# Patient Record
Sex: FEMALE | Race: WHITE | Employment: OTHER | URBAN - METROPOLITAN AREA
[De-identification: names, ages, dates, MRNs, and addresses within clinical notes are randomized per-mention and may not be internally consistent; named-entity substitution may affect disease eponyms.]

---

## 2017-03-21 ENCOUNTER — OFFICE VISIT (OUTPATIENT)
Dept: ENT CLINIC | Age: 72
End: 2017-03-21

## 2017-03-21 VITALS
RESPIRATION RATE: 16 BRPM | DIASTOLIC BLOOD PRESSURE: 77 MMHG | SYSTOLIC BLOOD PRESSURE: 129 MMHG | WEIGHT: 142 LBS | HEART RATE: 68 BPM | HEIGHT: 63 IN | BODY MASS INDEX: 25.16 KG/M2

## 2017-03-21 DIAGNOSIS — H61.23 BILATERAL IMPACTED CERUMEN: Primary | ICD-10-CM

## 2017-03-21 PROCEDURE — 69210 REMOVE IMPACTED EAR WAX UNI: CPT | Performed by: OTOLARYNGOLOGY

## 2017-03-21 PROCEDURE — 1036F TOBACCO NON-USER: CPT | Performed by: OTOLARYNGOLOGY

## 2017-04-20 ENCOUNTER — HOSPITAL ENCOUNTER (OUTPATIENT)
Dept: MAMMOGRAPHY | Age: 72
Discharge: HOME | End: 2017-04-20
Attending: OBSTETRICS & GYNECOLOGY | Admitting: OBSTETRICS & GYNECOLOGY

## 2017-04-20 DIAGNOSIS — Z12.31 ENCOUNTER FOR SCREENING MAMMOGRAM FOR BREAST CANCER: ICD-10-CM

## 2017-05-31 ENCOUNTER — HOSPITAL ENCOUNTER (OUTPATIENT)
Dept: PHYSICAL THERAPY | Age: 72
Discharge: HOME | End: 2017-05-31
Admitting: ORTHOPAEDIC SURGERY

## 2017-06-15 ENCOUNTER — TELEPHONE (OUTPATIENT)
Dept: FAMILY MEDICINE CLINIC | Age: 72
End: 2017-06-15

## 2017-06-15 DIAGNOSIS — Z13.820 SCREENING FOR OSTEOPOROSIS: Primary | ICD-10-CM

## 2017-06-15 DIAGNOSIS — M81.0 OSTEOPOROSIS: ICD-10-CM

## 2017-06-16 RX ORDER — LEVOTHYROXINE AND LIOTHYRONINE 57; 13.5 UG/1; UG/1
TABLET ORAL
Qty: 30 TABLET | Refills: 5 | Status: SHIPPED | OUTPATIENT
Start: 2017-06-16 | End: 2017-12-15 | Stop reason: SDUPTHER

## 2017-06-21 ENCOUNTER — HOSPITAL ENCOUNTER (OUTPATIENT)
Dept: GENERAL RADIOLOGY | Age: 72
Discharge: HOME | End: 2017-06-21
Attending: NURSE PRACTITIONER | Admitting: NURSE PRACTITIONER

## 2017-06-21 DIAGNOSIS — M81.0 AGE-RELATED OSTEOPOROSIS WITHOUT CURRENT PATHOLOGICAL FRACTURE: ICD-10-CM

## 2017-06-21 DIAGNOSIS — M81.0 OSTEOPOROSIS: ICD-10-CM

## 2017-06-21 DIAGNOSIS — Z13.820 SCREENING FOR OSTEOPOROSIS: ICD-10-CM

## 2017-07-17 ENCOUNTER — OFFICE VISIT (OUTPATIENT)
Dept: FAMILY MEDICINE CLINIC | Age: 72
End: 2017-07-17

## 2017-07-17 VITALS
OXYGEN SATURATION: 97 % | DIASTOLIC BLOOD PRESSURE: 74 MMHG | BODY MASS INDEX: 25.52 KG/M2 | RESPIRATION RATE: 12 BRPM | SYSTOLIC BLOOD PRESSURE: 128 MMHG | HEART RATE: 86 BPM | HEIGHT: 63 IN | WEIGHT: 144 LBS

## 2017-07-17 DIAGNOSIS — M81.0 OSTEOPOROSIS: Primary | ICD-10-CM

## 2017-07-17 DIAGNOSIS — R42 DIZZY SPELLS: ICD-10-CM

## 2017-07-17 DIAGNOSIS — M16.10 ARTHRITIS, HIP: ICD-10-CM

## 2017-07-17 DIAGNOSIS — L82.0 INFLAMED SEBORRHEIC KERATOSIS: ICD-10-CM

## 2017-07-17 DIAGNOSIS — E03.4 HYPOTHYROIDISM DUE TO ACQUIRED ATROPHY OF THYROID: ICD-10-CM

## 2017-07-17 PROCEDURE — G8599 NO ASA/ANTIPLAT THER USE RNG: HCPCS | Performed by: FAMILY MEDICINE

## 2017-07-17 PROCEDURE — 4040F PNEUMOC VAC/ADMIN/RCVD: CPT | Performed by: FAMILY MEDICINE

## 2017-07-17 PROCEDURE — G8399 PT W/DXA RESULTS DOCUMENT: HCPCS | Performed by: FAMILY MEDICINE

## 2017-07-17 PROCEDURE — 99214 OFFICE O/P EST MOD 30 MIN: CPT | Performed by: FAMILY MEDICINE

## 2017-07-17 PROCEDURE — 1123F ACP DISCUSS/DSCN MKR DOCD: CPT | Performed by: FAMILY MEDICINE

## 2017-07-17 PROCEDURE — G8419 CALC BMI OUT NRM PARAM NOF/U: HCPCS | Performed by: FAMILY MEDICINE

## 2017-07-17 PROCEDURE — G8427 DOCREV CUR MEDS BY ELIG CLIN: HCPCS | Performed by: FAMILY MEDICINE

## 2017-07-17 PROCEDURE — G8510 SCR DEP NEG, NO PLAN REQD: HCPCS | Performed by: FAMILY MEDICINE

## 2017-07-17 PROCEDURE — 1090F PRES/ABSN URINE INCON ASSESS: CPT | Performed by: FAMILY MEDICINE

## 2017-07-17 PROCEDURE — 3017F COLORECTAL CA SCREEN DOC REV: CPT | Performed by: FAMILY MEDICINE

## 2017-07-17 PROCEDURE — 3288F FALL RISK ASSESSMENT DOCD: CPT | Performed by: FAMILY MEDICINE

## 2017-07-17 PROCEDURE — 1036F TOBACCO NON-USER: CPT | Performed by: FAMILY MEDICINE

## 2017-07-17 PROCEDURE — 4005F PHARM THX FOR OP RXD: CPT | Performed by: FAMILY MEDICINE

## 2017-07-17 PROCEDURE — 3014F SCREEN MAMMO DOC REV: CPT | Performed by: FAMILY MEDICINE

## 2017-07-17 RX ORDER — ACYCLOVIR 400 MG/1
400 TABLET ORAL DAILY
COMMUNITY
Start: 2017-06-14 | End: 2020-01-07 | Stop reason: SDUPTHER

## 2017-07-17 ASSESSMENT — PATIENT HEALTH QUESTIONNAIRE - PHQ9
1. LITTLE INTEREST OR PLEASURE IN DOING THINGS: 0
SUM OF ALL RESPONSES TO PHQ9 QUESTIONS 1 & 2: 0
SUM OF ALL RESPONSES TO PHQ QUESTIONS 1-9: 0
2. FEELING DOWN, DEPRESSED OR HOPELESS: 0

## 2017-07-25 ENCOUNTER — TELEPHONE (OUTPATIENT)
Dept: FAMILY MEDICINE CLINIC | Age: 72
End: 2017-07-25

## 2017-08-09 ENCOUNTER — TELEPHONE (OUTPATIENT)
Dept: FAMILY MEDICINE CLINIC | Age: 72
End: 2017-08-09

## 2017-08-10 ENCOUNTER — OFFICE VISIT (OUTPATIENT)
Dept: FAMILY MEDICINE CLINIC | Age: 72
End: 2017-08-10

## 2017-08-10 VITALS
DIASTOLIC BLOOD PRESSURE: 62 MMHG | SYSTOLIC BLOOD PRESSURE: 106 MMHG | RESPIRATION RATE: 16 BRPM | BODY MASS INDEX: 26.26 KG/M2 | OXYGEN SATURATION: 96 % | TEMPERATURE: 97.8 F | WEIGHT: 148.2 LBS | HEIGHT: 63 IN | HEART RATE: 78 BPM

## 2017-08-10 DIAGNOSIS — K62.5 RECTAL BLEEDING: ICD-10-CM

## 2017-08-10 DIAGNOSIS — R01.1 CARDIAC MURMUR: Primary | ICD-10-CM

## 2017-08-10 DIAGNOSIS — K64.9 HEMORRHOIDS, UNSPECIFIED HEMORRHOID TYPE: ICD-10-CM

## 2017-08-10 LAB
HCT VFR BLD CALC: 41.1 % (ref 36–48)
HEMOGLOBIN: 13.6 G/DL (ref 12–16)
MCH RBC QN AUTO: 31.3 PG (ref 26–34)
MCHC RBC AUTO-ENTMCNC: 33.1 G/DL (ref 31–36)
MCV RBC AUTO: 94.7 FL (ref 80–100)
PDW BLD-RTO: 13.9 % (ref 12.4–15.4)
PLATELET # BLD: 202 K/UL (ref 135–450)
PMV BLD AUTO: 10.3 FL (ref 5–10.5)
RBC # BLD: 4.34 M/UL (ref 4–5.2)
WBC # BLD: 7.4 K/UL (ref 4–11)

## 2017-08-10 PROCEDURE — 1090F PRES/ABSN URINE INCON ASSESS: CPT | Performed by: REGISTERED NURSE

## 2017-08-10 PROCEDURE — G8399 PT W/DXA RESULTS DOCUMENT: HCPCS | Performed by: REGISTERED NURSE

## 2017-08-10 PROCEDURE — 1036F TOBACCO NON-USER: CPT | Performed by: REGISTERED NURSE

## 2017-08-10 PROCEDURE — G8599 NO ASA/ANTIPLAT THER USE RNG: HCPCS | Performed by: REGISTERED NURSE

## 2017-08-10 PROCEDURE — 1123F ACP DISCUSS/DSCN MKR DOCD: CPT | Performed by: REGISTERED NURSE

## 2017-08-10 PROCEDURE — G8427 DOCREV CUR MEDS BY ELIG CLIN: HCPCS | Performed by: REGISTERED NURSE

## 2017-08-10 PROCEDURE — 3017F COLORECTAL CA SCREEN DOC REV: CPT | Performed by: REGISTERED NURSE

## 2017-08-10 PROCEDURE — 3014F SCREEN MAMMO DOC REV: CPT | Performed by: REGISTERED NURSE

## 2017-08-10 PROCEDURE — 99213 OFFICE O/P EST LOW 20 MIN: CPT | Performed by: REGISTERED NURSE

## 2017-08-10 PROCEDURE — 4040F PNEUMOC VAC/ADMIN/RCVD: CPT | Performed by: REGISTERED NURSE

## 2017-08-10 PROCEDURE — 36415 COLL VENOUS BLD VENIPUNCTURE: CPT | Performed by: REGISTERED NURSE

## 2017-08-10 PROCEDURE — G8419 CALC BMI OUT NRM PARAM NOF/U: HCPCS | Performed by: REGISTERED NURSE

## 2017-08-10 ASSESSMENT — ENCOUNTER SYMPTOMS
ANAL BLEEDING: 1
ABDOMINAL PAIN: 0
SHORTNESS OF BREATH: 0
ABDOMINAL DISTENTION: 1
NAUSEA: 0
DIARRHEA: 0
BLOOD IN STOOL: 0
VOMITING: 0
CONSTIPATION: 0
COUGH: 0
WHEEZING: 0
RECTAL PAIN: 0
CHEST TIGHTNESS: 0

## 2017-08-11 ENCOUNTER — NURSE ONLY (OUTPATIENT)
Dept: FAMILY MEDICINE CLINIC | Age: 72
End: 2017-08-11

## 2017-08-11 DIAGNOSIS — K62.5 RECTAL BLEEDING: ICD-10-CM

## 2017-08-11 LAB
CONTROL: YES
HEMOCCULT STL QL: NORMAL

## 2017-08-11 PROCEDURE — 82274 ASSAY TEST FOR BLOOD FECAL: CPT | Performed by: REGISTERED NURSE

## 2017-08-18 ENCOUNTER — HOSPITAL ENCOUNTER (OUTPATIENT)
Dept: NON INVASIVE DIAGNOSTICS | Age: 72
Discharge: HOME | End: 2017-08-18
Attending: REGISTERED NURSE | Admitting: REGISTERED NURSE

## 2017-08-18 DIAGNOSIS — R01.1 CARDIAC MURMUR: ICD-10-CM

## 2017-08-18 LAB
LV EF: 60 %
LVEF MODALITY: NORMAL

## 2017-08-24 ENCOUNTER — OFFICE VISIT (OUTPATIENT)
Dept: FAMILY MEDICINE CLINIC | Age: 72
End: 2017-08-24

## 2017-08-24 ENCOUNTER — TELEPHONE (OUTPATIENT)
Dept: FAMILY MEDICINE CLINIC | Age: 72
End: 2017-08-24

## 2017-08-24 VITALS
BODY MASS INDEX: 26.05 KG/M2 | SYSTOLIC BLOOD PRESSURE: 132 MMHG | OXYGEN SATURATION: 98 % | HEIGHT: 63 IN | DIASTOLIC BLOOD PRESSURE: 70 MMHG | WEIGHT: 147 LBS | RESPIRATION RATE: 14 BRPM | HEART RATE: 63 BPM

## 2017-08-24 DIAGNOSIS — M25.562 ACUTE PAIN OF LEFT KNEE: Primary | ICD-10-CM

## 2017-08-24 DIAGNOSIS — K64.9 BLEEDING HEMORRHOIDS: ICD-10-CM

## 2017-08-24 DIAGNOSIS — K63.5 POLYP OF COLON, UNSPECIFIED PART OF COLON, UNSPECIFIED TYPE: ICD-10-CM

## 2017-08-24 DIAGNOSIS — M70.50 ANSERINE BURSITIS: ICD-10-CM

## 2017-08-24 PROCEDURE — G8419 CALC BMI OUT NRM PARAM NOF/U: HCPCS | Performed by: FAMILY MEDICINE

## 2017-08-24 PROCEDURE — 3017F COLORECTAL CA SCREEN DOC REV: CPT | Performed by: FAMILY MEDICINE

## 2017-08-24 PROCEDURE — 1123F ACP DISCUSS/DSCN MKR DOCD: CPT | Performed by: FAMILY MEDICINE

## 2017-08-24 PROCEDURE — 99213 OFFICE O/P EST LOW 20 MIN: CPT | Performed by: FAMILY MEDICINE

## 2017-08-24 PROCEDURE — 1090F PRES/ABSN URINE INCON ASSESS: CPT | Performed by: FAMILY MEDICINE

## 2017-08-24 PROCEDURE — G8399 PT W/DXA RESULTS DOCUMENT: HCPCS | Performed by: FAMILY MEDICINE

## 2017-08-24 PROCEDURE — 3014F SCREEN MAMMO DOC REV: CPT | Performed by: FAMILY MEDICINE

## 2017-08-24 PROCEDURE — G8427 DOCREV CUR MEDS BY ELIG CLIN: HCPCS | Performed by: FAMILY MEDICINE

## 2017-08-24 PROCEDURE — G8599 NO ASA/ANTIPLAT THER USE RNG: HCPCS | Performed by: FAMILY MEDICINE

## 2017-08-24 PROCEDURE — 4040F PNEUMOC VAC/ADMIN/RCVD: CPT | Performed by: FAMILY MEDICINE

## 2017-08-24 PROCEDURE — 1036F TOBACCO NON-USER: CPT | Performed by: FAMILY MEDICINE

## 2017-08-24 RX ORDER — ETODOLAC 400 MG/1
400 TABLET, FILM COATED ORAL 2 TIMES DAILY
Qty: 30 TABLET | Refills: 0 | Status: SHIPPED | OUTPATIENT
Start: 2017-08-24 | End: 2018-01-18 | Stop reason: ALTCHOICE

## 2017-08-24 NOTE — TELEPHONE ENCOUNTER
Pt seen today -  Her script for    etodolac (LODINE) 400 MG tablet - didn't go through        [facility] [address], [facility] [address] [phone]      E-scribing is down

## 2017-09-14 ENCOUNTER — HOSPITAL ENCOUNTER (OUTPATIENT)
Dept: PHYSICAL THERAPY | Age: 72
Discharge: HOME | End: 2017-09-30
Admitting: ORTHOPAEDIC SURGERY

## 2017-09-14 ASSESSMENT — PAIN DESCRIPTION - DIRECTION: RADIATING_TOWARDS: DENIES

## 2017-09-14 ASSESSMENT — PAIN DESCRIPTION - DESCRIPTORS: DESCRIPTORS: ACHING

## 2017-09-14 ASSESSMENT — PAIN DESCRIPTION - ORIENTATION: ORIENTATION: LEFT

## 2017-09-14 ASSESSMENT — PAIN SCALES - GENERAL: PAINLEVEL_OUTOF10: 5

## 2017-09-14 ASSESSMENT — PAIN DESCRIPTION - PAIN TYPE: TYPE: CHRONIC PAIN

## 2017-09-14 ASSESSMENT — PAIN DESCRIPTION - LOCATION: LOCATION: KNEE

## 2017-09-14 ASSESSMENT — PAIN DESCRIPTION - FREQUENCY: FREQUENCY: INTERMITTENT

## 2017-09-14 NOTE — PROGRESS NOTES
Primary  Bill Paying/Finance Responsibility: Primary  Shopping Responsibility: Primary  Dependent Care Responsibility: Primary  Health Care Management: Primary  Ambulation Assistance: Independent  Transfer Assistance: Independent  Active : Yes  Mode of Transportation: Car  Occupation: Retired  Leisure & Hobbies: rides motorcycle,  yard work  Objective    Outpatient fall risk assessment completed asking screening question if patient has fallen in the past 30 days:  [x] Yes  [] No    Based on screen for falls, patient demonstrates fall risk:  [] Yes  [] No    Interventions based on fall risk status:  Updated Problem List within Medical History  [] Yes   [] N/A    Asked family to assist with increased observation of the patient  [] Yes   [] N/A    Patient kept in visible area when not closely supervised by therapist  [] Yes   [] N/A    Repeatedly reinforce activity limits and safety needs with patient/family  [] Yes   [] N/A    Increase frequency of rounding/monitoring patient  [] Yes   [] N/A        Observation/Palpation  Posture: Good  Palpation: TTP medial L knee joint line,  Pes anserine  Edema: mild edema over L pes anserine,      AROM RLE (degrees)  RLE AROM: WNL  AROM LLE (degrees)  LLE AROM : WNL    Strength RLE  Strength RLE: WNL  Strength LLE  Strength LLE: WNL         Balance  Tandem Stance R Le  Tandem Stance L Le     Assessment   Conditions Requiring Skilled Therapeutic Intervention  Body structures, Functions, Activity limitations: Decreased high-level IADLs  Assessment: Pt presents with Left anteriomedial knee pain after walking for 5 hours up/down ramps. She stated pain worsened until she had injections and symptoms have begun to lessen.   She will benefit from skilled therapy to promote complete healing and return to PLOF  Treatment diagnosis:  Soft tissue inflammation L anterior Knee  Prognosis: Good  Decision Making: Low Complexity  Activity Tolerance  Activity Tolerance: Patient Tolerated treatment well         Plan   Plan  Times per week: 2  Times per day: Daily  Plan weeks: 3  Specific instructions for Next Treatment: US,  Neuro-re-ed,    Current Treatment Recommendations: Modalities, Home Exercise Program, Neuromuscular Re-education    G-Code  PT G-Codes  Functional Assessment Tool Used: LEFS  Score: 50  Functional Limitation: Mobility: Walking and moving around  Mobility: Walking and Moving Around Current Status (): At least 20 percent but less than 40 percent impaired, limited or restricted  Mobility: Walking and Moving Around Goal Status (): 0 percent impaired, limited or restricted    OutComes Score                                  LEFS Score: 62.5                  Goals  Short term goals  Time Frame for Short term goals: 3 weeks  Short term goal 1: pt to report no pain with riding motorcycle or doing household chores  Short term goal 2: No pain with walking any distance.   Patient Goals   Patient goals : resolve pain and do all normal activities       Therapy Time   Individual Concurrent Group Co-treatment   Time In 6057         Time Out 1258         Minutes 55         Timed Code Treatment Minutes: David [facility] Ho, [facility]

## 2017-09-18 ENCOUNTER — TELEPHONE (OUTPATIENT)
Dept: FAMILY MEDICINE CLINIC | Age: 72
End: 2017-09-18

## 2017-09-19 ENCOUNTER — OFFICE VISIT (OUTPATIENT)
Dept: ENT CLINIC | Age: 72
End: 2017-09-19

## 2017-09-19 VITALS
SYSTOLIC BLOOD PRESSURE: 119 MMHG | DIASTOLIC BLOOD PRESSURE: 67 MMHG | WEIGHT: 142.8 LBS | HEART RATE: 75 BPM | BODY MASS INDEX: 26.28 KG/M2 | HEIGHT: 62 IN

## 2017-09-19 DIAGNOSIS — H61.23 BILATERAL IMPACTED CERUMEN: Primary | ICD-10-CM

## 2017-09-19 PROCEDURE — 1036F TOBACCO NON-USER: CPT | Performed by: OTOLARYNGOLOGY

## 2017-09-19 PROCEDURE — 69210 REMOVE IMPACTED EAR WAX UNI: CPT | Performed by: OTOLARYNGOLOGY

## 2017-09-27 ENCOUNTER — HOSPITAL ENCOUNTER (OUTPATIENT)
Dept: PHYSICAL THERAPY | Age: 72
Discharge: HOME | End: 2017-09-27
Admitting: ORTHOPAEDIC SURGERY

## 2017-10-05 ENCOUNTER — HOSPITAL ENCOUNTER (OUTPATIENT)
Dept: PHYSICAL THERAPY | Age: 72
Discharge: HOME | End: 2017-10-05
Admitting: ORTHOPAEDIC SURGERY

## 2017-10-05 NOTE — PROGRESS NOTES
Outpatient Physical Therapy    Phone: [phone] Fax: [phone]    Physical Therapy Discharge Note  Date: 10/5/2017        Patient Name:  Natalia Lu    :  1945  MRN: [id number]  Restrictions/Precautions:    Medical/Treatment Diagnosis Information:   · Diagnosis: L knee OA, Pes anserine bursitis, possible med meiscus tear  · Treatment Diagnosis: Soft tissue inflammation anterior L knee     Tracking Information:       Physician Information Referring Practitioner: Dennys Dillon MD   [facility]   Plan of Care Sent Date:  17 Signed Received:    Visit Count / Total Visits       Insurance Approved Visits  /  Approved Dates:     Insurance Information PT Insurance Information: Medicare/AARP    Progress Note/G-codes   [x]  Yes                                     []  No Next Due:           G-Code (if applicable):      Date G-Code Applied:  10/5/17  PT G-Codes  Functional Assessment Tool Used: LEFS  Score: 60  Functional Limitation: Mobility: Walking and moving around  Mobility: Walking and Moving Around Goal Status (): 0 percent impaired, limited or restricted  Mobility: Walking and Moving Around Discharge Status (): At least 1 percent but less than 20 percent impaired, limited or restricted     Time Period for Report:  17  Through 10/5/17  Cancels/No-shows to date:  0      Plan of Care/Treatment to date:  [x] Therapeutic Exercise      [] Modalities:  [x] Therapeutic Activity       [] Ultrasound    [] Gait Training        [] Cervical Traction   [x] Neuromuscular Re-education      [] Cold/hotpack    [x] Instruction in HEP        [] Lumbar Traction  [] Manual Therapy        [x] Electrical Stimulation            [] Aquatic Therapy        [] Iontophoresis        ? [] Lymphedema management  [] Women's Health     Other:  [] Vestibular Rehab        []              ?       Significant Findings At Last Visit/Comments:    Subjective:   Pt states knee mostly feels normal.  Still has occasional pain.    Feels that still at times over does activities. Objective:  Observation:  Test measurements:   SLS  L   18 sec. L knee Strength, AROM WFL. Assessment:  Summary:   Patient's response to treatment:   Pt hgas progressed well. All goals met    Progress towards goals:    Short term goals  Time Frame for Short term goals: 3 weeks  Short term goal 1: pt to report no pain with riding motorcycle or doing household chores-  met,    Short term goal 2: No pain with walking any distance. -  Goal met  Patient Goals   Patient goals : resolve pain and do all normal activities  Partially met,  Still has some pain,  Particularly with rainy weather. Current Frequency/Duration:  # Days per week: [] 1 day # Weeks: [] 1 week [] 4 weeks      [x] 2 days? [] 2 weeks [] 5 weeks      [] 3 days   [x] 3 weeks [] 6 weeks     Rehab Potential: [x] Excellent [] Good [] Fair  [] Poor     Goal Status:  [] Achieved [x] Partially Achieved  [] Not Achieved     Patient Status: [] Continue per initial plan of Care     [x] Patient now discharged     [] Additional visits requested, Please re-certify for additional visits:      Requested frequency/duration:  X/week for weeks    Electronically signed by: Yamila Longoria, PT [id number]      If you have any questions or concerns, please don't hesitate to call.   Thank you for your referral.    Physician Signature:________________________________Date:__________________  By signing above, therapists plan is approved by physician

## 2017-11-01 ENCOUNTER — HOSPITAL ENCOUNTER (OUTPATIENT)
Dept: OTHER | Age: 72
Discharge: HOME | End: 2017-11-30
Attending: ORTHOPAEDIC SURGERY | Admitting: ORTHOPAEDIC SURGERY

## 2017-12-15 ENCOUNTER — OFFICE VISIT (OUTPATIENT)
Dept: FAMILY MEDICINE CLINIC | Age: 72
End: 2017-12-15

## 2017-12-15 VITALS
DIASTOLIC BLOOD PRESSURE: 74 MMHG | RESPIRATION RATE: 14 BRPM | WEIGHT: 142 LBS | BODY MASS INDEX: 26.13 KG/M2 | SYSTOLIC BLOOD PRESSURE: 110 MMHG | HEART RATE: 74 BPM | HEIGHT: 62 IN

## 2017-12-15 DIAGNOSIS — E55.9 VITAMIN D DEFICIENCY: ICD-10-CM

## 2017-12-15 DIAGNOSIS — F43.23 ADJUSTMENT REACTION WITH ANXIETY AND DEPRESSION: ICD-10-CM

## 2017-12-15 DIAGNOSIS — R05.9 COUGH: ICD-10-CM

## 2017-12-15 DIAGNOSIS — A60.09 HERPES GENITALIS IN WOMEN: ICD-10-CM

## 2017-12-15 DIAGNOSIS — M81.0 OSTEOPOROSIS WITHOUT CURRENT PATHOLOGICAL FRACTURE, UNSPECIFIED OSTEOPOROSIS TYPE: ICD-10-CM

## 2017-12-15 DIAGNOSIS — E78.00 HYPERCHOLESTEREMIA: ICD-10-CM

## 2017-12-15 DIAGNOSIS — E03.4 HYPOTHYROIDISM DUE TO ACQUIRED ATROPHY OF THYROID: Primary | ICD-10-CM

## 2017-12-15 LAB
A/G RATIO: 1.7 (ref 1.1–2.2)
ALBUMIN SERPL-MCNC: 4.4 G/DL (ref 3.4–5)
ALP BLD-CCNC: 82 U/L (ref 40–129)
ALT SERPL-CCNC: 14 U/L (ref 10–40)
ANION GAP SERPL CALCULATED.3IONS-SCNC: 14 MMOL/L (ref 3–16)
AST SERPL-CCNC: 19 U/L (ref 15–37)
BILIRUB SERPL-MCNC: 0.5 MG/DL (ref 0–1)
BUN BLDV-MCNC: 10 MG/DL (ref 7–20)
CALCIUM SERPL-MCNC: 9.6 MG/DL (ref 8.3–10.6)
CHLORIDE BLD-SCNC: 98 MMOL/L (ref 99–110)
CHOLESTEROL, TOTAL: 225 MG/DL (ref 0–199)
CO2: 25 MMOL/L (ref 21–32)
CREAT SERPL-MCNC: 0.6 MG/DL (ref 0.6–1.2)
GFR AFRICAN AMERICAN: >60
GFR NON-AFRICAN AMERICAN: >60
GLOBULIN: 2.6 G/DL
GLUCOSE BLD-MCNC: 99 MG/DL (ref 70–99)
HDLC SERPL-MCNC: 89 MG/DL (ref 40–60)
LDL CHOLESTEROL CALCULATED: 121 MG/DL
POTASSIUM SERPL-SCNC: 4.7 MMOL/L (ref 3.5–5.1)
SODIUM BLD-SCNC: 137 MMOL/L (ref 136–145)
T3 FREE: 6 PG/ML (ref 2.3–4.2)
T4 FREE: 1.3 NG/DL (ref 0.9–1.8)
TOTAL PROTEIN: 7 G/DL (ref 6.4–8.2)
TRIGL SERPL-MCNC: 74 MG/DL (ref 0–150)
TSH SERPL DL<=0.05 MIU/L-ACNC: 1.07 UIU/ML (ref 0.27–4.2)
VITAMIN D 25-HYDROXY: 39.9 NG/ML
VLDLC SERPL CALC-MCNC: 15 MG/DL

## 2017-12-15 PROCEDURE — G8484 FLU IMMUNIZE NO ADMIN: HCPCS | Performed by: FAMILY MEDICINE

## 2017-12-15 PROCEDURE — 1036F TOBACCO NON-USER: CPT | Performed by: FAMILY MEDICINE

## 2017-12-15 PROCEDURE — 1090F PRES/ABSN URINE INCON ASSESS: CPT | Performed by: FAMILY MEDICINE

## 2017-12-15 PROCEDURE — 1123F ACP DISCUSS/DSCN MKR DOCD: CPT | Performed by: FAMILY MEDICINE

## 2017-12-15 PROCEDURE — 3017F COLORECTAL CA SCREEN DOC REV: CPT | Performed by: FAMILY MEDICINE

## 2017-12-15 PROCEDURE — 4040F PNEUMOC VAC/ADMIN/RCVD: CPT | Performed by: FAMILY MEDICINE

## 2017-12-15 PROCEDURE — G8417 CALC BMI ABV UP PARAM F/U: HCPCS | Performed by: FAMILY MEDICINE

## 2017-12-15 PROCEDURE — G8399 PT W/DXA RESULTS DOCUMENT: HCPCS | Performed by: FAMILY MEDICINE

## 2017-12-15 PROCEDURE — 99214 OFFICE O/P EST MOD 30 MIN: CPT | Performed by: FAMILY MEDICINE

## 2017-12-15 PROCEDURE — G8599 NO ASA/ANTIPLAT THER USE RNG: HCPCS | Performed by: FAMILY MEDICINE

## 2017-12-15 PROCEDURE — G8427 DOCREV CUR MEDS BY ELIG CLIN: HCPCS | Performed by: FAMILY MEDICINE

## 2017-12-15 PROCEDURE — 36415 COLL VENOUS BLD VENIPUNCTURE: CPT | Performed by: FAMILY MEDICINE

## 2017-12-15 PROCEDURE — 4005F PHARM THX FOR OP RXD: CPT | Performed by: FAMILY MEDICINE

## 2017-12-15 PROCEDURE — 3014F SCREEN MAMMO DOC REV: CPT | Performed by: FAMILY MEDICINE

## 2017-12-15 RX ORDER — LEVOTHYROXINE AND LIOTHYRONINE 57; 13.5 UG/1; UG/1
TABLET ORAL
Qty: 30 TABLET | Refills: 5 | Status: SHIPPED | OUTPATIENT
Start: 2017-12-15 | End: 2018-06-15 | Stop reason: SDUPTHER
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
## 2018-01-18 ENCOUNTER — HOSPITAL ENCOUNTER (OUTPATIENT)
Dept: OTHER | Age: 73
Discharge: HOME | End: 2018-01-18
Attending: FAMILY MEDICINE | Admitting: FAMILY MEDICINE

## 2018-01-18 ENCOUNTER — OFFICE VISIT (OUTPATIENT)
Dept: FAMILY MEDICINE CLINIC | Age: 73
End: 2018-01-18

## 2018-01-18 VITALS
WEIGHT: 144 LBS | SYSTOLIC BLOOD PRESSURE: 112 MMHG | RESPIRATION RATE: 14 BRPM | DIASTOLIC BLOOD PRESSURE: 72 MMHG | HEIGHT: 62 IN | BODY MASS INDEX: 26.5 KG/M2 | TEMPERATURE: 98 F | HEART RATE: 92 BPM

## 2018-01-18 DIAGNOSIS — R05.9 COUGH: ICD-10-CM

## 2018-01-18 DIAGNOSIS — R05.9 COUGH: Primary | ICD-10-CM

## 2018-01-18 PROCEDURE — 3014F SCREEN MAMMO DOC REV: CPT | Performed by: FAMILY MEDICINE

## 2018-01-18 PROCEDURE — 1090F PRES/ABSN URINE INCON ASSESS: CPT | Performed by: FAMILY MEDICINE

## 2018-01-18 PROCEDURE — G8399 PT W/DXA RESULTS DOCUMENT: HCPCS | Performed by: FAMILY MEDICINE

## 2018-01-18 PROCEDURE — 4040F PNEUMOC VAC/ADMIN/RCVD: CPT | Performed by: FAMILY MEDICINE

## 2018-01-18 PROCEDURE — G8484 FLU IMMUNIZE NO ADMIN: HCPCS | Performed by: FAMILY MEDICINE

## 2018-01-18 PROCEDURE — G8427 DOCREV CUR MEDS BY ELIG CLIN: HCPCS | Performed by: FAMILY MEDICINE

## 2018-01-18 PROCEDURE — 1036F TOBACCO NON-USER: CPT | Performed by: FAMILY MEDICINE

## 2018-01-18 PROCEDURE — 99213 OFFICE O/P EST LOW 20 MIN: CPT | Performed by: FAMILY MEDICINE

## 2018-01-18 PROCEDURE — 3017F COLORECTAL CA SCREEN DOC REV: CPT | Performed by: FAMILY MEDICINE

## 2018-01-18 PROCEDURE — G8417 CALC BMI ABV UP PARAM F/U: HCPCS | Performed by: FAMILY MEDICINE

## 2018-01-18 PROCEDURE — G8599 NO ASA/ANTIPLAT THER USE RNG: HCPCS | Performed by: FAMILY MEDICINE

## 2018-01-18 PROCEDURE — 1123F ACP DISCUSS/DSCN MKR DOCD: CPT | Performed by: FAMILY MEDICINE

## 2018-01-18 RX ORDER — LEVOFLOXACIN 500 MG/1
500 TABLET, FILM COATED ORAL DAILY
Qty: 7 TABLET | Refills: 0 | Status: SHIPPED | OUTPATIENT
Start: 2018-01-18 | End: 2018-01-28

## 2018-01-18 RX ORDER — BENZONATATE 200 MG/1
200 CAPSULE ORAL 3 TIMES DAILY PRN
Qty: 21 CAPSULE | Refills: 0 | Status: SHIPPED | OUTPATIENT
Start: 2018-01-18 | End: 2018-01-25

## 2018-01-22 ENCOUNTER — TELEPHONE (OUTPATIENT)
Dept: FAMILY MEDICINE CLINIC | Age: 73
End: 2018-01-22

## 2018-01-26 ENCOUNTER — OFFICE VISIT (OUTPATIENT)
Dept: FAMILY MEDICINE CLINIC | Age: 73
End: 2018-01-26

## 2018-01-26 VITALS
BODY MASS INDEX: 26.5 KG/M2 | WEIGHT: 144 LBS | SYSTOLIC BLOOD PRESSURE: 114 MMHG | HEIGHT: 62 IN | DIASTOLIC BLOOD PRESSURE: 70 MMHG

## 2018-01-26 DIAGNOSIS — J18.9 PNEUMONIA DUE TO INFECTIOUS ORGANISM, UNSPECIFIED LATERALITY, UNSPECIFIED PART OF LUNG: ICD-10-CM

## 2018-01-26 DIAGNOSIS — R14.0 ABDOMINAL BLOATING: Primary | ICD-10-CM

## 2018-01-26 PROCEDURE — 1036F TOBACCO NON-USER: CPT | Performed by: FAMILY MEDICINE

## 2018-01-26 PROCEDURE — 3014F SCREEN MAMMO DOC REV: CPT | Performed by: FAMILY MEDICINE

## 2018-01-26 PROCEDURE — 1090F PRES/ABSN URINE INCON ASSESS: CPT | Performed by: FAMILY MEDICINE

## 2018-01-26 PROCEDURE — G8484 FLU IMMUNIZE NO ADMIN: HCPCS | Performed by: FAMILY MEDICINE

## 2018-01-26 PROCEDURE — G8427 DOCREV CUR MEDS BY ELIG CLIN: HCPCS | Performed by: FAMILY MEDICINE

## 2018-01-26 PROCEDURE — 4040F PNEUMOC VAC/ADMIN/RCVD: CPT | Performed by: FAMILY MEDICINE

## 2018-01-26 PROCEDURE — G8599 NO ASA/ANTIPLAT THER USE RNG: HCPCS | Performed by: FAMILY MEDICINE

## 2018-01-26 PROCEDURE — 1123F ACP DISCUSS/DSCN MKR DOCD: CPT | Performed by: FAMILY MEDICINE

## 2018-01-26 PROCEDURE — 99213 OFFICE O/P EST LOW 20 MIN: CPT | Performed by: FAMILY MEDICINE

## 2018-01-26 PROCEDURE — G8399 PT W/DXA RESULTS DOCUMENT: HCPCS | Performed by: FAMILY MEDICINE

## 2018-01-26 PROCEDURE — 3017F COLORECTAL CA SCREEN DOC REV: CPT | Performed by: FAMILY MEDICINE

## 2018-01-26 PROCEDURE — G8417 CALC BMI ABV UP PARAM F/U: HCPCS | Performed by: FAMILY MEDICINE

## 2018-02-23 ENCOUNTER — OFFICE VISIT (OUTPATIENT)
Dept: FAMILY MEDICINE CLINIC | Age: 73
End: 2018-02-23

## 2018-02-23 VITALS
RESPIRATION RATE: 21 BRPM | OXYGEN SATURATION: 97 % | BODY MASS INDEX: 25.65 KG/M2 | HEART RATE: 76 BPM | SYSTOLIC BLOOD PRESSURE: 110 MMHG | HEIGHT: 62 IN | DIASTOLIC BLOOD PRESSURE: 66 MMHG | TEMPERATURE: 97.9 F | WEIGHT: 139.4 LBS

## 2018-02-23 DIAGNOSIS — J02.9 SORE THROAT: Primary | ICD-10-CM

## 2018-02-23 LAB — S PYO AG THROAT QL: NORMAL

## 2018-02-23 PROCEDURE — 1036F TOBACCO NON-USER: CPT | Performed by: NURSE PRACTITIONER

## 2018-02-23 PROCEDURE — 3017F COLORECTAL CA SCREEN DOC REV: CPT | Performed by: NURSE PRACTITIONER

## 2018-02-23 PROCEDURE — 4040F PNEUMOC VAC/ADMIN/RCVD: CPT | Performed by: NURSE PRACTITIONER

## 2018-02-23 PROCEDURE — 3014F SCREEN MAMMO DOC REV: CPT | Performed by: NURSE PRACTITIONER

## 2018-02-23 PROCEDURE — 1123F ACP DISCUSS/DSCN MKR DOCD: CPT | Performed by: NURSE PRACTITIONER

## 2018-02-23 PROCEDURE — G8599 NO ASA/ANTIPLAT THER USE RNG: HCPCS | Performed by: NURSE PRACTITIONER

## 2018-02-23 PROCEDURE — 87880 STREP A ASSAY W/OPTIC: CPT | Performed by: NURSE PRACTITIONER

## 2018-02-23 PROCEDURE — 99213 OFFICE O/P EST LOW 20 MIN: CPT | Performed by: NURSE PRACTITIONER

## 2018-02-23 PROCEDURE — G8399 PT W/DXA RESULTS DOCUMENT: HCPCS | Performed by: NURSE PRACTITIONER

## 2018-02-23 PROCEDURE — 1090F PRES/ABSN URINE INCON ASSESS: CPT | Performed by: NURSE PRACTITIONER

## 2018-02-23 PROCEDURE — G8484 FLU IMMUNIZE NO ADMIN: HCPCS | Performed by: NURSE PRACTITIONER

## 2018-02-23 PROCEDURE — G8417 CALC BMI ABV UP PARAM F/U: HCPCS | Performed by: NURSE PRACTITIONER

## 2018-02-23 PROCEDURE — G8427 DOCREV CUR MEDS BY ELIG CLIN: HCPCS | Performed by: NURSE PRACTITIONER

## 2018-02-23 ASSESSMENT — ENCOUNTER SYMPTOMS: SORE THROAT: 1

## 2018-02-23 NOTE — PROGRESS NOTES
64 Ounces of water per day. This will loosen mucus in the head and chest & improve the weak feeling of dehydration, allow the body to get germ fighting resources to the infection. Half can be juice or sugar free Crystal Light. Don't count drinks with caffeine or carbonation. Infants can have Pedialyte liquid or freezer pops. Avoid salt if you have high Blood Pressure, swelling in the feet or ankles or have heart problems. 2. Humidity: Humidify the air to 35-50% ( or until the windows fog over slightly). Can use a humidifier, vaporizer, boil water on the stove or put a coffee can full of water on the heater vents. This will loosen mucus from infections and allergies. 3. Sleep: Get 8-10 hours a night and rest during the evening after work or school. If you have trouble sleeping, adults can take Melatonin 3mg up to 3 tabs at bedtime ( not for children or pregnant women). If Mono is suspected then sleep during 9PM to 9AM time span (if possible.)   4. Cough: Take cough medicines with Guaifenesin ( to loosen chest or head congestion) and Dextromethorphan ( to decrease excess cough). Robitussin D.M. Syrup every 4-6 hrs or Mucinex D.M. Pills twice a day. Use the pediatric formulations for children over 6 months making sure they are alcohol & sugar free for children, pregnant women, and diabetics. 5. Pain And Fevers: Take Acetaminophen ( Tylenol) for fevers, aches, and headaches. 2-500 mg every 8 hours for adults. Appropriate doses at bedtime for children may help them sleep better. If pregnant take 1 -500 mg. (Tylenol) every 8 hours as needed. Ibuprofen may be used if not pregnant, but should be given with food to avoid nausea. Avoid Ibuprofen if you have high blood pressure, CHF, or kidney problems. 6.Gargle: Gargle in the back of the throat with the head tilted back and to the sides with a strong mouthwash ( Listerine or Scope) after meals and at bedtime at least 4 -5 times a day.  This helps kill bacteria and

## 2018-02-25 LAB — THROAT CULTURE: NORMAL

## 2018-03-16 ENCOUNTER — OFFICE VISIT (OUTPATIENT)
Dept: ENT CLINIC | Age: 73
End: 2018-03-16

## 2018-03-16 VITALS — HEART RATE: 80 BPM | SYSTOLIC BLOOD PRESSURE: 110 MMHG | DIASTOLIC BLOOD PRESSURE: 60 MMHG

## 2018-03-16 DIAGNOSIS — H61.23 BILATERAL IMPACTED CERUMEN: Primary | ICD-10-CM

## 2018-03-16 PROCEDURE — 69210 REMOVE IMPACTED EAR WAX UNI: CPT | Performed by: OTOLARYNGOLOGY

## 2018-04-17 PROBLEM — R55 SYNCOPE AND COLLAPSE: Status: ACTIVE | Noted: 2018-04-17

## 2018-04-17 PROBLEM — E87.6 HYPOKALEMIA: Status: ACTIVE | Noted: 2018-04-17

## 2018-04-18 PROBLEM — R55 SYNCOPE AND COLLAPSE: Status: RESOLVED | Noted: 2018-04-17 | Resolved: 2018-04-18

## 2018-04-18 PROBLEM — E87.6 HYPOKALEMIA: Status: RESOLVED | Noted: 2018-04-17 | Resolved: 2018-04-18

## 2018-04-23 ENCOUNTER — HOSPITAL ENCOUNTER (OUTPATIENT)
Dept: MAMMOGRAPHY | Age: 73
Discharge: HOME | End: 2018-04-23
Attending: FAMILY MEDICINE | Admitting: FAMILY MEDICINE

## 2018-04-23 DIAGNOSIS — R92.8 ABNORMAL MAMMOGRAM OF RIGHT BREAST: Primary | ICD-10-CM

## 2018-04-23 DIAGNOSIS — Z12.31 ENCOUNTER FOR SCREENING MAMMOGRAM FOR BREAST CANCER: ICD-10-CM

## 2018-05-07 ENCOUNTER — HOSPITAL ENCOUNTER (OUTPATIENT)
Dept: MAMMOGRAPHY | Age: 73
Discharge: HOME | End: 2018-05-07
Admitting: FAMILY MEDICINE

## 2018-05-07 DIAGNOSIS — R92.8 OTHER ABNORMAL AND INCONCLUSIVE FINDINGS ON DIAGNOSTIC IMAGING OF BREAST: ICD-10-CM

## 2018-05-07 DIAGNOSIS — R92.8 ABNORMAL MAMMOGRAM: ICD-10-CM

## 2018-05-17 ENCOUNTER — OFFICE VISIT (OUTPATIENT)
Dept: FAMILY MEDICINE CLINIC | Age: 73
End: 2018-05-17

## 2018-05-17 VITALS
DIASTOLIC BLOOD PRESSURE: 78 MMHG | HEIGHT: 62 IN | BODY MASS INDEX: 25.25 KG/M2 | SYSTOLIC BLOOD PRESSURE: 122 MMHG | RESPIRATION RATE: 16 BRPM | WEIGHT: 137.2 LBS | HEART RATE: 72 BPM | TEMPERATURE: 97.8 F

## 2018-05-17 DIAGNOSIS — L30.9 DERMATITIS: Primary | ICD-10-CM

## 2018-05-17 PROCEDURE — G8599 NO ASA/ANTIPLAT THER USE RNG: HCPCS | Performed by: NURSE PRACTITIONER

## 2018-05-17 PROCEDURE — G8417 CALC BMI ABV UP PARAM F/U: HCPCS | Performed by: NURSE PRACTITIONER

## 2018-05-17 PROCEDURE — 1123F ACP DISCUSS/DSCN MKR DOCD: CPT | Performed by: NURSE PRACTITIONER

## 2018-05-17 PROCEDURE — 1036F TOBACCO NON-USER: CPT | Performed by: NURSE PRACTITIONER

## 2018-05-17 PROCEDURE — 1090F PRES/ABSN URINE INCON ASSESS: CPT | Performed by: NURSE PRACTITIONER

## 2018-05-17 PROCEDURE — 4040F PNEUMOC VAC/ADMIN/RCVD: CPT | Performed by: NURSE PRACTITIONER

## 2018-05-17 PROCEDURE — 1111F DSCHRG MED/CURRENT MED MERGE: CPT | Performed by: NURSE PRACTITIONER

## 2018-05-17 PROCEDURE — 3017F COLORECTAL CA SCREEN DOC REV: CPT | Performed by: NURSE PRACTITIONER

## 2018-05-17 PROCEDURE — 99213 OFFICE O/P EST LOW 20 MIN: CPT | Performed by: NURSE PRACTITIONER

## 2018-05-17 PROCEDURE — G8399 PT W/DXA RESULTS DOCUMENT: HCPCS | Performed by: NURSE PRACTITIONER

## 2018-05-17 PROCEDURE — G8427 DOCREV CUR MEDS BY ELIG CLIN: HCPCS | Performed by: NURSE PRACTITIONER

## 2018-05-17 RX ORDER — CLINDAMYCIN HYDROCHLORIDE 300 MG/1
300 CAPSULE ORAL 3 TIMES DAILY
Qty: 30 CAPSULE | Refills: 0 | Status: SHIPPED | OUTPATIENT
Start: 2018-05-17 | End: 2018-05-27

## 2018-05-17 RX ORDER — TRIAMCINOLONE ACETONIDE 1 MG/G
CREAM TOPICAL
Qty: 15 G | Refills: 0 | Status: SHIPPED | OUTPATIENT
Start: 2018-05-17 | End: 2018-06-15

## 2018-05-21 ENCOUNTER — TELEPHONE (OUTPATIENT)
Dept: FAMILY MEDICINE CLINIC | Age: 73
End: 2018-05-21

## 2018-05-21 RX ORDER — FLUCONAZOLE 150 MG/1
150 TABLET ORAL ONCE
Qty: 2 TABLET | Refills: 0 | Status: SHIPPED | OUTPATIENT
Start: 2018-05-21 | End: 2018-05-21

## 2018-05-21 NOTE — TELEPHONE ENCOUNTER
Pt was given an antibiotic last week and would like a diflucan called in.   She always gets a yeast infection  Please call

## 2018-06-15 ENCOUNTER — OFFICE VISIT (OUTPATIENT)
Dept: FAMILY MEDICINE CLINIC | Age: 73
End: 2018-06-15

## 2018-06-15 VITALS
WEIGHT: 139 LBS | HEART RATE: 80 BPM | HEIGHT: 62 IN | RESPIRATION RATE: 16 BRPM | DIASTOLIC BLOOD PRESSURE: 72 MMHG | BODY MASS INDEX: 25.58 KG/M2 | SYSTOLIC BLOOD PRESSURE: 100 MMHG

## 2018-06-15 DIAGNOSIS — E87.6 HYPOKALEMIA: Primary | ICD-10-CM

## 2018-06-15 DIAGNOSIS — R73.9 HYPERGLYCEMIA: ICD-10-CM

## 2018-06-15 DIAGNOSIS — R55 SYNCOPE, UNSPECIFIED SYNCOPE TYPE: ICD-10-CM

## 2018-06-15 DIAGNOSIS — M81.0 AGE-RELATED OSTEOPOROSIS WITHOUT CURRENT PATHOLOGICAL FRACTURE: ICD-10-CM

## 2018-06-15 DIAGNOSIS — E03.9 ACQUIRED HYPOTHYROIDISM: ICD-10-CM

## 2018-06-15 LAB
ANION GAP SERPL CALCULATED.3IONS-SCNC: 14 MMOL/L (ref 3–16)
BUN BLDV-MCNC: 13 MG/DL (ref 7–20)
CALCIUM SERPL-MCNC: 9.7 MG/DL (ref 8.3–10.6)
CHLORIDE BLD-SCNC: 96 MMOL/L (ref 99–110)
CO2: 25 MMOL/L (ref 21–32)
CREAT SERPL-MCNC: 0.6 MG/DL (ref 0.6–1.2)
GFR AFRICAN AMERICAN: >60
GFR NON-AFRICAN AMERICAN: >60
GLUCOSE BLD-MCNC: 92 MG/DL (ref 70–99)
POTASSIUM SERPL-SCNC: 4.3 MMOL/L (ref 3.5–5.1)
SODIUM BLD-SCNC: 135 MMOL/L (ref 136–145)
T3 FREE: 3.6 PG/ML (ref 2.3–4.2)
T4 FREE: 1.2 NG/DL (ref 0.9–1.8)
TSH SERPL DL<=0.05 MIU/L-ACNC: 0.73 UIU/ML (ref 0.27–4.2)
VITAMIN D 25-HYDROXY: 43.1 NG/ML

## 2018-06-15 PROCEDURE — 1090F PRES/ABSN URINE INCON ASSESS: CPT | Performed by: FAMILY MEDICINE

## 2018-06-15 PROCEDURE — 3017F COLORECTAL CA SCREEN DOC REV: CPT | Performed by: FAMILY MEDICINE

## 2018-06-15 PROCEDURE — 1123F ACP DISCUSS/DSCN MKR DOCD: CPT | Performed by: FAMILY MEDICINE

## 2018-06-15 PROCEDURE — 99214 OFFICE O/P EST MOD 30 MIN: CPT | Performed by: FAMILY MEDICINE

## 2018-06-15 PROCEDURE — G8417 CALC BMI ABV UP PARAM F/U: HCPCS | Performed by: FAMILY MEDICINE

## 2018-06-15 PROCEDURE — 36415 COLL VENOUS BLD VENIPUNCTURE: CPT | Performed by: FAMILY MEDICINE

## 2018-06-15 PROCEDURE — G8427 DOCREV CUR MEDS BY ELIG CLIN: HCPCS | Performed by: FAMILY MEDICINE

## 2018-06-15 PROCEDURE — G8599 NO ASA/ANTIPLAT THER USE RNG: HCPCS | Performed by: FAMILY MEDICINE

## 2018-06-15 PROCEDURE — 4040F PNEUMOC VAC/ADMIN/RCVD: CPT | Performed by: FAMILY MEDICINE

## 2018-06-15 PROCEDURE — G8399 PT W/DXA RESULTS DOCUMENT: HCPCS | Performed by: FAMILY MEDICINE

## 2018-06-15 PROCEDURE — 1036F TOBACCO NON-USER: CPT | Performed by: FAMILY MEDICINE

## 2018-06-15 RX ORDER — LEVOTHYROXINE AND LIOTHYRONINE 57; 13.5 UG/1; UG/1
TABLET ORAL
Qty: 30 TABLET | Refills: 5 | Status: SHIPPED | OUTPATIENT
Start: 2018-06-15 | End: 2019-01-04 | Stop reason: SDUPTHER

## 2018-08-08 ENCOUNTER — OFFICE VISIT (OUTPATIENT)
Dept: FAMILY MEDICINE CLINIC | Age: 73
End: 2018-08-08

## 2018-08-08 VITALS
TEMPERATURE: 98.1 F | DIASTOLIC BLOOD PRESSURE: 74 MMHG | WEIGHT: 136.8 LBS | BODY MASS INDEX: 25.17 KG/M2 | HEIGHT: 62 IN | SYSTOLIC BLOOD PRESSURE: 118 MMHG

## 2018-08-08 DIAGNOSIS — R39.12 WEAK URINE STREAM: Primary | ICD-10-CM

## 2018-08-08 DIAGNOSIS — R82.90 URINE ABNORMALITY: ICD-10-CM

## 2018-08-08 LAB
BILIRUBIN, POC: ABNORMAL
BLOOD URINE, POC: ABNORMAL
CLARITY, POC: CLEAR
COLOR, POC: YELLOW
GLUCOSE URINE, POC: ABNORMAL
KETONES, POC: ABNORMAL
LEUKOCYTE EST, POC: ABNORMAL
NITRITE, POC: ABNORMAL
PH, POC: 5.5
PROTEIN, POC: ABNORMAL
SPECIFIC GRAVITY, POC: 1.01
UROBILINOGEN, POC: 0.2

## 2018-08-08 PROCEDURE — 1036F TOBACCO NON-USER: CPT | Performed by: NURSE PRACTITIONER

## 2018-08-08 PROCEDURE — G8427 DOCREV CUR MEDS BY ELIG CLIN: HCPCS | Performed by: NURSE PRACTITIONER

## 2018-08-08 PROCEDURE — 1101F PT FALLS ASSESS-DOCD LE1/YR: CPT | Performed by: NURSE PRACTITIONER

## 2018-08-08 PROCEDURE — G8417 CALC BMI ABV UP PARAM F/U: HCPCS | Performed by: NURSE PRACTITIONER

## 2018-08-08 PROCEDURE — 1090F PRES/ABSN URINE INCON ASSESS: CPT | Performed by: NURSE PRACTITIONER

## 2018-08-08 PROCEDURE — 4040F PNEUMOC VAC/ADMIN/RCVD: CPT | Performed by: NURSE PRACTITIONER

## 2018-08-08 PROCEDURE — G8599 NO ASA/ANTIPLAT THER USE RNG: HCPCS | Performed by: NURSE PRACTITIONER

## 2018-08-08 PROCEDURE — 81002 URINALYSIS NONAUTO W/O SCOPE: CPT | Performed by: NURSE PRACTITIONER

## 2018-08-08 PROCEDURE — 1123F ACP DISCUSS/DSCN MKR DOCD: CPT | Performed by: NURSE PRACTITIONER

## 2018-08-08 PROCEDURE — 99213 OFFICE O/P EST LOW 20 MIN: CPT | Performed by: NURSE PRACTITIONER

## 2018-08-08 PROCEDURE — 3017F COLORECTAL CA SCREEN DOC REV: CPT | Performed by: NURSE PRACTITIONER

## 2018-08-08 PROCEDURE — G8399 PT W/DXA RESULTS DOCUMENT: HCPCS | Performed by: NURSE PRACTITIONER

## 2018-08-08 ASSESSMENT — PATIENT HEALTH QUESTIONNAIRE - PHQ9
2. FEELING DOWN, DEPRESSED OR HOPELESS: 0
SUM OF ALL RESPONSES TO PHQ9 QUESTIONS 1 & 2: 0
1. LITTLE INTEREST OR PLEASURE IN DOING THINGS: 0
SUM OF ALL RESPONSES TO PHQ QUESTIONS 1-9: 0
SUM OF ALL RESPONSES TO PHQ QUESTIONS 1-9: 0

## 2018-08-08 NOTE — PROGRESS NOTES
Alla Aguilera  67 y.o. female    1945      CC: UTI    Chief Complaint   Patient presents with    Urinary Tract Infection     PT CO SLOW STREAM WHEN URINATING MAINLY AT NIGHT, X 1 MONTH. NO OTHER SX.      HPI     Had this in past and had to have urethra dilated. Saw Dr Lianet Bernard  Has had slow stream and it is mostly at night and in the evening. No pain with urination. No visible blood in the urine. Has been 6-7 years since had to have the urethra stretched - had do it a couple of times. Has not typically had UTIs in the past.   Did have dehydration in the past.     Allergies   Allergen Reactions    Alendronate      Stomach upset    Doxycycline Other (See Comments)     Abdominal pain    Penicillins Hives    Sulfa Antibiotics     Zithromax [Azithromycin]        Physical  Examination    Physical Exam   Abdominal: Soft. Bowel sounds are normal. She exhibits no distension and no mass. There is no tenderness. There is no rebound and no guarding. No flank pain. Constitutional: Oriented to person, place, and time and well-developed, well-nourished, and in no distress. No distress. Cardiovascular: Normal rate, regular rhythm and normal heart sounds. Exam reveals no gallop and no friction rub. No murmur heard. Pulmonary/Chest: Effort normal and breath sounds normal. No respiratory distress. He has no wheezes. No rales. Exhibits no tenderness. Musculoskeletal: Exhibits no edema. Neurological: Alert and oriented to person, place, and time. Skin: Skin is warm and dry. No diaphoresis  Psychiatric: Mood, memory, affect and judgment normal.   Nursing note and vitals reviewed. Vitals:    08/08/18 1536   BP: 118/74   Site: Right Arm   Position: Sitting   Cuff Size: Large Adult   Temp: 98.1 °F (36.7 °C)   TempSrc: Oral   Weight: 136 lb 12.8 oz (62.1 kg)   Height: 5' 2\" (1.575 m)     Body mass index is 25.02 kg/m².      Wt Readings from Last 3 Encounters:   08/08/18 136 lb 12.8 oz (62.1 kg) 06/15/18 139 lb (63 kg)   05/17/18 137 lb 3.2 oz (62.2 kg)     BP Readings from Last 3 Encounters:   08/08/18 118/74   06/15/18 100/72   05/17/18 122/78        Results for POC orders placed in visit on 08/08/18   POCT Urinalysis no Micro   Result Value Ref Range    Color, UA YELLOW     Clarity, UA CLEAR     Glucose, UA POC NEG     Bilirubin, UA NEG     Ketones, UA NEG     Spec Grav, UA 1.015     Blood, UA POC NEG     pH, UA 5.5     Protein, UA POC NEG     Urobilinogen, UA 0.2     Leukocytes, UA SMALL     Nitrite, UA NEG        Assessment     Diagnosis Orders   1. Weak urine stream  Urine Culture   2. Urine abnormality  POCT Urinalysis no Micro    Urine Culture         Plan    Call with fever or worsening symptoms for Cipro antibiotic pending culture. Otherwise, await culture - treat if infection, and if no infection, refer to Dr Nadine Edge to re-stretch urethra. Return if symptoms worsen or fail to improve.     Patient Instructions   Call with fever or worsening symptoms for Cipro

## 2018-08-11 LAB — URINE CULTURE, ROUTINE: NORMAL

## 2018-08-23 ENCOUNTER — TELEPHONE (OUTPATIENT)
Dept: FAMILY MEDICINE CLINIC | Age: 73
End: 2018-08-23

## 2018-08-28 ENCOUNTER — TELEPHONE (OUTPATIENT)
Dept: FAMILY MEDICINE CLINIC | Age: 73
End: 2018-08-28

## 2018-08-28 NOTE — TELEPHONE ENCOUNTER
Patient went to the ER at [facility] on 8/23/2018 for a cat bite, she is on medication and will be done with them tomorrow night, but there is still a red spot, tender and she is wondering if she needs to be seen by Dr. Kiana Pruett and when she takes medication she gets a yeast infection - she has a pill from the past and was wondering if she needs to take it today or tomorrow. Please give her a call back.

## 2018-08-29 ENCOUNTER — TELEPHONE (OUTPATIENT)
Dept: FAMILY MEDICINE CLINIC | Age: 73
End: 2018-08-29

## 2018-08-29 ENCOUNTER — OFFICE VISIT (OUTPATIENT)
Dept: FAMILY MEDICINE CLINIC | Age: 73
End: 2018-08-29

## 2018-08-29 VITALS
HEART RATE: 80 BPM | HEIGHT: 62 IN | RESPIRATION RATE: 12 BRPM | DIASTOLIC BLOOD PRESSURE: 70 MMHG | SYSTOLIC BLOOD PRESSURE: 114 MMHG | WEIGHT: 138 LBS | BODY MASS INDEX: 25.4 KG/M2

## 2018-08-29 DIAGNOSIS — S51.851D CAT BITE OF RIGHT FOREARM, SUBSEQUENT ENCOUNTER: ICD-10-CM

## 2018-08-29 DIAGNOSIS — Z09 HOSPITAL DISCHARGE FOLLOW-UP: Primary | ICD-10-CM
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
## 2018-08-29 PROCEDURE — 1036F TOBACCO NON-USER: CPT | Performed by: REGISTERED NURSE

## 2018-08-29 PROCEDURE — G8599 NO ASA/ANTIPLAT THER USE RNG: HCPCS | Performed by: REGISTERED NURSE

## 2018-08-29 PROCEDURE — 10060 I&D ABSCESS SIMPLE/SINGLE: CPT | Performed by: REGISTERED NURSE

## 2018-08-29 PROCEDURE — G8510 SCR DEP NEG, NO PLAN REQD: HCPCS | Performed by: REGISTERED NURSE

## 2018-08-29 PROCEDURE — G8427 DOCREV CUR MEDS BY ELIG CLIN: HCPCS | Performed by: REGISTERED NURSE

## 2018-08-29 PROCEDURE — 1101F PT FALLS ASSESS-DOCD LE1/YR: CPT | Performed by: REGISTERED NURSE

## 2018-08-29 PROCEDURE — 1123F ACP DISCUSS/DSCN MKR DOCD: CPT | Performed by: REGISTERED NURSE

## 2018-08-29 PROCEDURE — G8417 CALC BMI ABV UP PARAM F/U: HCPCS | Performed by: REGISTERED NURSE

## 2018-08-29 PROCEDURE — 4040F PNEUMOC VAC/ADMIN/RCVD: CPT | Performed by: REGISTERED NURSE

## 2018-08-29 PROCEDURE — 1090F PRES/ABSN URINE INCON ASSESS: CPT | Performed by: REGISTERED NURSE

## 2018-08-29 PROCEDURE — 99213 OFFICE O/P EST LOW 20 MIN: CPT | Performed by: REGISTERED NURSE

## 2018-08-29 PROCEDURE — G8399 PT W/DXA RESULTS DOCUMENT: HCPCS | Performed by: REGISTERED NURSE

## 2018-08-29 PROCEDURE — 3017F COLORECTAL CA SCREEN DOC REV: CPT | Performed by: REGISTERED NURSE

## 2018-08-29 RX ORDER — FLUCONAZOLE 150 MG/1
150 TABLET ORAL ONCE
Qty: 2 TABLET | Refills: 0 | Status: SHIPPED | OUTPATIENT
Start: 2018-08-29 | End: 2020-04-03 | Stop reason: SDUPTHER

## 2018-08-29 RX ORDER — CLINDAMYCIN HYDROCHLORIDE 300 MG/1
300 CAPSULE ORAL 4 TIMES DAILY
Qty: 12 CAPSULE | Refills: 0 | Status: SHIPPED | OUTPATIENT
Start: 2018-08-29 | End: 2018-09-01

## 2018-08-29 RX ORDER — CIPROFLOXACIN 500 MG/1
500 TABLET, FILM COATED ORAL 2 TIMES DAILY
Qty: 6 TABLET | Refills: 0 | Status: SHIPPED | OUTPATIENT
Start: 2018-08-29 | End: 2018-09-01

## 2018-08-29 ASSESSMENT — PATIENT HEALTH QUESTIONNAIRE - PHQ9
2. FEELING DOWN, DEPRESSED OR HOPELESS: 0
1. LITTLE INTEREST OR PLEASURE IN DOING THINGS: 0
SUM OF ALL RESPONSES TO PHQ9 QUESTIONS 1 & 2: 0
SUM OF ALL RESPONSES TO PHQ QUESTIONS 1-9: 0
SUM OF ALL RESPONSES TO PHQ QUESTIONS 1-9: 0

## 2018-08-29 NOTE — TELEPHONE ENCOUNTER
Patient is not sure if she is supposed to be doing a stool test.  Knows that she is to have a colonoscopy next year. Please let her know if she needs to come back and  the testing kit.   Says she can come and get it tomorrow

## 2018-08-29 NOTE — PATIENT INSTRUCTIONS
license by [facility] ([facility]). If you have questions about a medical condition or this instruction, always ask your healthcare professional. [place] [address] any warranty or liability for your use of this information.

## 2018-08-29 NOTE — PROGRESS NOTES
Kristian [address]    no BSO    JOINT REPLACEMENT  2009    left total hip for fracture    ROTATOR CUFF REPAIR  5/17/2005    right    SKIN CANCER EXCISION  [id number]    BASAL CELL REMOVAL LEFT LEG       Office Visit on 08/08/2018   Component Date Value Ref Range Status    Color, UA 08/08/2018 YELLOW   Final    Clarity, UA 08/08/2018 CLEAR   Final    Glucose, UA POC 08/08/2018 NEG   Final    Bilirubin, UA 08/08/2018 NEG   Final    Ketones, UA 08/08/2018 NEG   Final    Spec Grav, UA 08/08/2018 1.015   Final    Blood, UA POC 08/08/2018 NEG   Final    pH, UA 08/08/2018 5.5   Final    Protein, UA POC 08/08/2018 NEG   Final    Urobilinogen, UA 08/08/2018 0.2   Final    Leukocytes, UA 08/08/2018 SMALL   Final    Nitrite, UA 08/08/2018 NEG   Final    Urine Culture, Routine 08/08/2018 No growth at 18-36 hours   Final       Family History   Problem Relation Age of Onset    Cancer Father         spine    Cancer Sister         pancreas    Substance Abuse Brother         alcohol    Liver Disease Brother         cirrhosis    Alzheimer's Disease Mother        Current Outpatient Prescriptions   Medication Sig Dispense Refill    clindamycin (CLEOCIN) 300 MG capsule Take 1 capsule by mouth 4 times daily for 7 days May sub Generic and 150 mg capsules and 2x the amount 28 capsule 0    ciprofloxacin (CIPRO) 500 MG tablet Take 1 tablet by mouth 2 times daily for 7 days 14 tablet 0    thyroid (ARMOUR THYROID) 90 MG tablet TAKE ONE TABLET BY MOUTH EVERY DAY to every other day as directed 30 tablet 5    acyclovir (ZOVIRAX) 400 MG tablet 400 mg daily       Misc Natural Products CAPS Take 1 capsule by mouth daily. ADRENAL CAPS      Vitamin E 100 UNITS TABS Take 1 tablet by mouth.  UNABLE TO FIND 1 tablet. DIGESTZYMEV      UNABLE TO FIND 1 tablet. 4 SIGHT      UNABLE TO FIND 1 tablet. ORTHO BIOTIC      UNABLE TO FIND Take 2 tablets by mouth daily.  cosmedix       UNABLE TO FIND Take 3 tablets by mouth

## 2018-08-31 ENCOUNTER — TELEPHONE (OUTPATIENT)
Dept: FAMILY MEDICINE CLINIC | Age: 73
End: 2018-08-31

## 2018-08-31 ENCOUNTER — OFFICE VISIT (OUTPATIENT)
Dept: FAMILY MEDICINE CLINIC | Age: 73
End: 2018-08-31

## 2018-08-31 VITALS
HEART RATE: 80 BPM | WEIGHT: 138 LBS | TEMPERATURE: 97.4 F | HEIGHT: 62 IN | RESPIRATION RATE: 16 BRPM | SYSTOLIC BLOOD PRESSURE: 116 MMHG | BODY MASS INDEX: 25.4 KG/M2 | DIASTOLIC BLOOD PRESSURE: 74 MMHG
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
## 2018-08-31 PROCEDURE — G8427 DOCREV CUR MEDS BY ELIG CLIN: HCPCS | Performed by: FAMILY MEDICINE

## 2018-08-31 PROCEDURE — 1036F TOBACCO NON-USER: CPT | Performed by: FAMILY MEDICINE

## 2018-08-31 PROCEDURE — 99212 OFFICE O/P EST SF 10 MIN: CPT | Performed by: FAMILY MEDICINE

## 2018-08-31 PROCEDURE — 3017F COLORECTAL CA SCREEN DOC REV: CPT | Performed by: FAMILY MEDICINE

## 2018-08-31 PROCEDURE — 1123F ACP DISCUSS/DSCN MKR DOCD: CPT | Performed by: FAMILY MEDICINE

## 2018-08-31 PROCEDURE — 1090F PRES/ABSN URINE INCON ASSESS: CPT | Performed by: FAMILY MEDICINE

## 2018-08-31 PROCEDURE — 1101F PT FALLS ASSESS-DOCD LE1/YR: CPT | Performed by: FAMILY MEDICINE

## 2018-08-31 PROCEDURE — G8599 NO ASA/ANTIPLAT THER USE RNG: HCPCS | Performed by: FAMILY MEDICINE

## 2018-08-31 PROCEDURE — G8417 CALC BMI ABV UP PARAM F/U: HCPCS | Performed by: FAMILY MEDICINE

## 2018-08-31 PROCEDURE — 4040F PNEUMOC VAC/ADMIN/RCVD: CPT | Performed by: FAMILY MEDICINE

## 2018-08-31 PROCEDURE — G8399 PT W/DXA RESULTS DOCUMENT: HCPCS | Performed by: FAMILY MEDICINE
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
## 2018-09-01 LAB
GRAM STAIN RESULT: NORMAL
WOUND/ABSCESS: NORMAL

## 2018-09-17 ENCOUNTER — OFFICE VISIT (OUTPATIENT)
Dept: ENT CLINIC | Age: 73
End: 2018-09-17

## 2018-09-17 VITALS — HEART RATE: 89 BPM | SYSTOLIC BLOOD PRESSURE: 122 MMHG | DIASTOLIC BLOOD PRESSURE: 68 MMHG | OXYGEN SATURATION: 94 %

## 2018-09-17 DIAGNOSIS — H61.23 BILATERAL IMPACTED CERUMEN: Primary | ICD-10-CM

## 2018-09-17 PROCEDURE — 1090F PRES/ABSN URINE INCON ASSESS: CPT | Performed by: OTOLARYNGOLOGY

## 2018-09-17 PROCEDURE — 1101F PT FALLS ASSESS-DOCD LE1/YR: CPT | Performed by: OTOLARYNGOLOGY

## 2018-09-17 PROCEDURE — 3017F COLORECTAL CA SCREEN DOC REV: CPT | Performed by: OTOLARYNGOLOGY

## 2018-09-17 PROCEDURE — G8417 CALC BMI ABV UP PARAM F/U: HCPCS | Performed by: OTOLARYNGOLOGY

## 2018-09-17 PROCEDURE — 1036F TOBACCO NON-USER: CPT | Performed by: OTOLARYNGOLOGY

## 2018-09-17 PROCEDURE — G8399 PT W/DXA RESULTS DOCUMENT: HCPCS | Performed by: OTOLARYNGOLOGY

## 2018-09-17 PROCEDURE — 4040F PNEUMOC VAC/ADMIN/RCVD: CPT | Performed by: OTOLARYNGOLOGY

## 2018-09-17 PROCEDURE — 1123F ACP DISCUSS/DSCN MKR DOCD: CPT | Performed by: OTOLARYNGOLOGY

## 2018-09-17 PROCEDURE — 69210 REMOVE IMPACTED EAR WAX UNI: CPT | Performed by: OTOLARYNGOLOGY

## 2018-09-17 PROCEDURE — G8599 NO ASA/ANTIPLAT THER USE RNG: HCPCS | Performed by: OTOLARYNGOLOGY

## 2018-09-17 PROCEDURE — G8427 DOCREV CUR MEDS BY ELIG CLIN: HCPCS | Performed by: OTOLARYNGOLOGY

## 2018-09-17 NOTE — PROGRESS NOTES
Patient has been having her ears cleared of cerumen accumulation on a 6 monthly basis. The left is now causing some muffled hearing but no pain or drainage. There has been no vertigo and no tinnitus. She does not smoke. Currently, she appears to be in no acute distress. Ear examination reveals significant cerumen present on the left and a more moderate amount on the right. This is removed in both cases with instrumentation including curettage, suctioning, cleaning with peroxide and on the left side irrigation. Upon removal, the right membranes on both sides. The be normal.  Ear canals are then clear. No evidence of infection or fluid is noted on either side. Oral examination is unremarkable. The nasal mucosa is normal.  The neck is free of any adenopathy, mass, thyroid enlargement. She will return again in 6 months for repeat examination and removal of cerumen.

## 2018-10-10 ENCOUNTER — HOSPITAL ENCOUNTER (OUTPATIENT)
Dept: PHYSICAL THERAPY | Age: 73
Setting detail: THERAPIES SERIES
Discharge: HOME | End: 2018-10-10
Payer: MEDICARE

## 2018-10-10 PROCEDURE — G8979 MOBILITY GOAL STATUS: HCPCS

## 2018-10-10 PROCEDURE — 97530 THERAPEUTIC ACTIVITIES: CPT

## 2018-10-10 PROCEDURE — 97161 PT EVAL LOW COMPLEX 20 MIN: CPT

## 2018-10-10 PROCEDURE — G8978 MOBILITY CURRENT STATUS: HCPCS

## 2018-10-10 PROCEDURE — 97110 THERAPEUTIC EXERCISES: CPT

## 2018-10-10 ASSESSMENT — PAIN DESCRIPTION - LOCATION: LOCATION: KNEE

## 2018-10-10 ASSESSMENT — PAIN DESCRIPTION - FREQUENCY: FREQUENCY: INTERMITTENT

## 2018-10-10 ASSESSMENT — PAIN DESCRIPTION - PAIN TYPE: TYPE: ACUTE PAIN

## 2018-10-10 ASSESSMENT — PAIN DESCRIPTION - ORIENTATION: ORIENTATION: LEFT

## 2018-10-10 ASSESSMENT — PAIN DESCRIPTION - DESCRIPTORS: DESCRIPTORS: ACHING

## 2018-10-10 ASSESSMENT — PAIN SCALES - GENERAL: PAINLEVEL_OUTOF10: 6

## 2018-10-10 NOTE — FLOWSHEET NOTE
Physical Therapy Daily Treatment Note  Date:  10/10/2018    Patient Name:  Dontrell Salas    :  1945  MRN: [id number]  Restrictions/Precautions:    Medical/Treatment Diagnosis Information:   · Diagnosis: Bilateral primary OA of knee M17.0   · Treatment Diagnosis: Decreased hip/knee strength, impaired balance, diff with gait     Tracking Information:  Physician Information Referring Practitioner: Tasha Lagos     Plan of Care Sent Date:  10/10/18  Signed Received:    Visit Count / Total Visits  + 0/8     Insurance Approved Visits  /  Approved Dates:     Insurance Information PT Insurance Information: Medicare      Progress Note/G-codes   []  Yes  [x]  No Next Due: discharge      Pain level: 6/10 left knee with gait      Subjective:  SEE EVAL     Objective:   Observation: SEE EVAL   Test measurements:      Exercises:  Exercise/Equipment Resistance/Repetitions Other comments   Bike     Stair Stretch     T.G. Cable Column     Mat HEP      Step-Ups     Balance // Bars                     [facility]                       Other Therapeutic Activities:  Pt was educated on PT POC, Diagnosis, Prognosis, pathomechanics, as well as treatment goals and options, and frequency/duration of scheduling future physical therapy appointments. Time was also taken on this day to answer all patient questions involving their participation in PT      Home Exercise Program:  Pt was educated on  HEP including distribution of handout describing exercises, sets, repetitions, frequency and intensity.  Exercises/activities include: SAQ, Hamstring stretch, SLR 3-way, hip add isometrics     Manual Treatments:      Modalities:      Timed Code Treatment Minutes:  25    Total Treatment Minutes:  60    Treatment/Activity Tolerance:  [] Patient tolerated treatment well [] Patient limited by fatigue  [] Patient limited by pain  [] Patient limited by other medical complications  [] Other:     Prognosis: [] Good [x] Fair  []

## 2018-10-10 NOTE — PROGRESS NOTES
steps to enter. Pain: 6/10 with walking, left knee Goals:  no pain   Pain Screening  Patient Currently in Pain: Yes  Pain Assessment  Pain Assessment: 0-10  Pain Level: 6  Pain Type: Acute pain  Pain Location: Knee  Pain Orientation: Left  Pain Descriptors: Aching  Pain Frequency: Intermittent  Effect of Pain on Daily Activities: Problems with walking   Pain Intervention(s): Cold applied  Vital Signs  Patient Currently in Pain: Yes      Social/Functional History  Social/Functional History  Lives With: Alone  Type of Home: House  Home Layout: One level  Home Access: Level entry  Occupation: Retired  Leisure & Hobbies: mall walking   Objective    Observation/Palpation  Observation: Positive antalgia     AROM RLE (degrees)  RLE AROM: WNL  RLE General AROM: Knee WNL  AROM LLE (degrees)  LLE AROM : WNL  LLE General AROM: Knee ROM full, WNL with pain at end range flexion with excessive force. Strength RLE  Comment: Hip Flex 5/5, Hip Abd 4+/5, Hip Ext 4-/5, Knee 5/5  Strength LLE  Comment: Hip Flex 4+/5, Hip Abd 4-/5, Hip Ext 3+/5, Knee flex 4+/5, Knee ext 4+/5    Additional Measures  Flexibility: HS Flexibility:  LLE: Lacking 30*, RLE: Lacking 12*   Special Tests: (-) Erick, (+) Valgus Stress LLE,      Balance  Single Leg Stance R Le  Single Leg Stance L Le  Comments: pain with SLS LLE     Assessment  Conditions Requiring Skilled Therapeutic Intervention  Body structures, Functions, Activity limitations: Decreased functional mobility ; Decreased ROM; Decreased strength;Decreased balance  Assessment: Pt presents to skilled PT with 1 month history of left knee pain with decreased hip/knee strength and stability, causing pain with all gait, short distances.  Pt would benefit from skilled PT services to address these deficits in order to resume PLOF   Treatment Diagnosis: Decreased hip/knee strength, impaired balance, diff with gait   Prognosis: Good  Decision Making: Low Complexity  REQUIRES PT FOLLOW UP:

## 2018-10-12 ENCOUNTER — HOSPITAL ENCOUNTER (OUTPATIENT)
Dept: PHYSICAL THERAPY | Age: 73
Setting detail: THERAPIES SERIES
Discharge: HOME | End: 2018-10-12
Payer: MEDICARE

## 2018-10-12 PROCEDURE — 97110 THERAPEUTIC EXERCISES: CPT

## 2018-10-12 PROCEDURE — 97112 NEUROMUSCULAR REEDUCATION: CPT

## 2018-10-12 NOTE — FLOWSHEET NOTE
Physical Therapy Daily Treatment Note  Date:  10/12/2018    Patient Name:  Kanika Hernandez    :  1945  MRN: [id number]  Restrictions/Precautions:    Medical/Treatment Diagnosis Information:   · Diagnosis: Bilateral primary OA of knee M17.0   · Treatment Diagnosis: Decreased hip/knee strength, impaired balance, diff with gait     Tracking Information:  Physician Information Referring Practitioner: Cristiana Sun     Plan of Care Sent Date:  10/10/18  Signed Received:    Visit Count / Total Visits  +      Insurance Approved Visits  /  Approved Dates:     Insurance Information PT Insurance Information: Medicare      Progress Note/G-codes   []  Yes  [x]  No Next Due: discharge      Pain level: 5/10 left knee with gait      Subjective:  Pt reports her pain started abut 3 weeks ago after mowing the lawn with a mower that she had to push. Got a cortisone shot which helped, but not this new pain. Pt is taking fish oil and tart cherry juice to reduce pain. Objective:   Observation:    Test measurements:      Exercises:  Exercise/Equipment Resistance/Repetitions Other comments   Bike Bike #3, seat 3, level 1     Stairs HHS 30 sec x 2 B  Knee flexion stretch 30 sec x 2 B    Forward step downs 4 inch x 10 B no UE support     Squats x10, B UE support     T.G. Squats x 10   Single leg squats x 10 B    Cable Column     Mat HEP      Step-Ups     Balance // [employer] wobble board DF/PF taps x 20 and balance x 1 min, no UE support    Square wobble board lateral taps x 20 and balance x 1 min, no UE support    Tandem stance on airex x 1 min each no UE support                     [facility]                       Other Therapeutic Activities:  Pt was educated on PT POC, Diagnosis, Prognosis, pathomechanics, as well as treatment goals and options, and frequency/duration of scheduling future physical therapy appointments.  Time was also taken on this day to answer all patient questions involving their participation in PT      Home Exercise Program:  Pt was educated on  HEP including distribution of handout describing exercises, sets, repetitions, frequency and intensity. Exercises/activities include: SAQ, Hamstring stretch, SLR 3-way, hip add isometrics     Manual Treatments:      Modalities:      Timed Code Treatment Minutes:  29    Total Treatment Minutes:  29    Treatment/Activity Tolerance:  [x] Patient tolerated treatment well [] Patient limited by fatigue  [] Patient limited by pain  [] Patient limited by other medical complications  [] Other:     Prognosis: [] Good [x] Fair  [] Poor    Patient Requires Follow-up: [x] Yes  [] No    Plan:   [x] Continue per plan of care [] Alter current plan (see comments)  [] Plan of care initiated [] Hold pending MD visit [] Discharge    Plan for Next Session:   See above.   Eliminate pain with ambulation     Electronically signed by:  Jad Miller, PT, DPT

## 2018-10-15 ENCOUNTER — HOSPITAL ENCOUNTER (OUTPATIENT)
Dept: PHYSICAL THERAPY | Age: 73
Setting detail: THERAPIES SERIES
Discharge: HOME | End: 2018-10-15
Payer: MEDICARE

## 2018-10-15 PROCEDURE — 97110 THERAPEUTIC EXERCISES: CPT

## 2018-10-15 PROCEDURE — 97112 NEUROMUSCULAR REEDUCATION: CPT

## 2018-10-17 ENCOUNTER — APPOINTMENT (OUTPATIENT)
Dept: PHYSICAL THERAPY | Age: 73
End: 2018-10-17
Payer: MEDICARE

## 2018-10-19 ENCOUNTER — HOSPITAL ENCOUNTER (OUTPATIENT)
Dept: PHYSICAL THERAPY | Age: 73
Setting detail: THERAPIES SERIES
Discharge: HOME | End: 2018-10-19
Payer: MEDICARE

## 2018-10-19 PROCEDURE — 97530 THERAPEUTIC ACTIVITIES: CPT

## 2018-10-19 PROCEDURE — 97112 NEUROMUSCULAR REEDUCATION: CPT

## 2018-10-22 ENCOUNTER — HOSPITAL ENCOUNTER (OUTPATIENT)
Dept: PHYSICAL THERAPY | Age: 73
Setting detail: THERAPIES SERIES
Discharge: HOME | End: 2018-10-22
Payer: MEDICARE

## 2018-10-22 PROCEDURE — 97110 THERAPEUTIC EXERCISES: CPT

## 2018-10-22 PROCEDURE — 97112 NEUROMUSCULAR REEDUCATION: CPT

## 2018-10-22 NOTE — FLOWSHEET NOTE
Physical Therapy Daily Treatment Note  Date:  10/22/2018    Patient Name:  Domenico Pizarro    :  1945  MRN: [id number]  Restrictions/Precautions:    Medical/Treatment Diagnosis Information:   · Diagnosis: Bilateral primary OA of knee M17.0   · Treatment Diagnosis: Decreased hip/knee strength, impaired balance, diff with gait     Tracking Information:  Physician Information Referring Practitioner: Arash Zafar     Plan of Care Sent Date:  10/10/18  Signed Received:    Visit Count / Total Visits  +      Insurance Approved Visits  /  Approved Dates:     Insurance Information PT Insurance Information: Medicare      Progress Note/G-codes   []  Yes  [x]  No Next Due: discharge      Pain level: 2/10     Subjective:   Pt states she is feeling better today and that she is progressing. Worked out yesterday for the first time, walked on treadmill for about 15 mins and did stair stepper. States she was a little sore but tolerable. Objective:   Observation:    Test measurements:      Exercises:  Exercise/Equipment Resistance/Repetitions Other comments   Bike Bike #2, seat 3, level 2 x 3 mins     Stairs HHS 30 sec x 2 B  Knee flexion stretch 30 sec x 2 B         Squats x10, B UE support     T.G. Squats x 10   Single leg squats x 10 B    Cable Column TKE 3 plates x 10 B    Mat HEP      Step-Ups     Balance // [employer] wobble board DF/PF/Lateral taps x 20 and balance x 1 min, no UE support    Tandem stance on airex x 1 min each no UE support     BOSU step-up with opp hip flexion x 10 B                        [facility]                       Other Therapeutic Activities:  Pt was educated on PT POC, Diagnosis, Prognosis, pathomechanics, as well as treatment goals and options, and frequency/duration of scheduling future physical therapy appointments.  Time was also taken on this day to answer all patient questions involving their participation in PT      Home Exercise Program:  Pt was educated on  HEP

## 2018-10-24 ENCOUNTER — HOSPITAL ENCOUNTER (OUTPATIENT)
Dept: PHYSICAL THERAPY | Age: 73
Setting detail: THERAPIES SERIES
Discharge: HOME | End: 2018-10-24
Payer: MEDICARE

## 2018-10-24 PROCEDURE — 97110 THERAPEUTIC EXERCISES: CPT

## 2018-10-24 PROCEDURE — 97530 THERAPEUTIC ACTIVITIES: CPT

## 2018-10-24 NOTE — FLOWSHEET NOTE
Physical Therapy Daily Treatment Note  Date:  10/24/2018    Patient Name:  Kristyn Negro    :  1945  MRN: [id number]  Restrictions/Precautions:    Medical/Treatment Diagnosis Information:   · Diagnosis: Bilateral primary OA of knee M17.0   · Treatment Diagnosis: Decreased hip/knee strength, impaired balance, diff with gait     Tracking Information:  Physician Information Referring Practitioner: Narinder Santo     Plan of Care Sent Date:  10/10/18  Signed Received:    Visit Count / Total Visits  +      Insurance Approved Visits  /  Approved Dates:     Insurance Information PT Insurance Information: Medicare      Progress Note/G-codes   []  Yes  [x]  No Next Due: discharge      Pain level: 2/10     Subjective:   Pt reports that she was walking the mall this morning, knee felt okay during. Objective:   Observation:    Test measurements:      Exercises:  Exercise/Equipment Resistance/Repetitions Other comments   Bike Bike #2, seat 3, level 2 x 3 mins     Stairs HHS 30 sec x 2 B  Knee flexion stretch 30 sec x 2 B             T.G. Squats x 10   Single leg squats x 10 B    Cable Column TKE 3 plates x 10 B    Mat HEP      Step-Ups     Balance // [employer] wobble board DF/PF/Lateral taps x 20 and balance x 1 min, no UE support    Tandem stance on airex x 1 min each no UE support     BOSU step-up with opp hip flexion x 10 B                        [facility]                       Other Therapeutic Activities:  Pt was educated on PT POC, Diagnosis, Prognosis, pathomechanics, as well as treatment goals and options, and frequency/duration of scheduling future physical therapy appointments. Time was also taken on this day to answer all patient questions involving their participation in PT      Home Exercise Program:  Pt was educated on  HEP including distribution of handout describing exercises, sets, repetitions, frequency and intensity.  Exercises/activities include: SAQ, Hamstring stretch, SLR 3-way, hip add isometrics     Manual Treatments:      Modalities:      Timed Code Treatment Minutes: 30    Total Treatment Minutes:  30    Treatment/Activity Tolerance:  [x] Patient tolerated treatment well [] Patient limited by fatigue  [] Patient limited by pain  [] Patient limited by other medical complications  [] Other:     Prognosis: [] Good [x] Fair  [] Poor    Patient Requires Follow-up: [x] Yes  [] No    Plan:   [x] Continue per plan of care [] Alter current plan (see comments)  [] Plan of care initiated [] Hold pending MD visit [] Discharge    Plan for Next Session:   See above.   Eliminate pain with ambulation     Electronically signed by:  Kristen Us, PT, DPT

## 2018-10-29 ENCOUNTER — HOSPITAL ENCOUNTER (OUTPATIENT)
Dept: PHYSICAL THERAPY | Age: 73
Setting detail: THERAPIES SERIES
Discharge: HOME | End: 2018-10-29
Payer: MEDICARE

## 2018-10-29 PROCEDURE — 97110 THERAPEUTIC EXERCISES: CPT

## 2018-10-29 NOTE — FLOWSHEET NOTE
Physical Therapy Daily Treatment Note  Date:  10/29/2018    Patient Name:  Fatimah Banerjee    :  1945  MRN: [id number]  Restrictions/Precautions:    Medical/Treatment Diagnosis Information:   · Diagnosis: Bilateral primary OA of knee M17.0   · Treatment Diagnosis: Decreased hip/knee strength, impaired balance, diff with gait     Tracking Information:  Physician Information Referring Practitioner: James Gongora     Plan of Care Sent Date:  10/10/18  Signed Received:    Visit Count / Total Visits  +      Insurance Approved Visits  /  Approved Dates:     Insurance Information PT Insurance Information: Medicare      Progress Note/G-codes   []  Yes  [x]  No Next Due: Next visit     Pain level: 0/10     Subjective:   Pt reports pain with exercise but otherwise has no other pain. Pt went to [employer] this weekend and had some pain with walking up steps. Objective:   Observation:    Test measurements:      Exercises:  Exercise/Equipment Resistance/Repetitions Other comments   Bike Bike #2, seat 3, level 2 x 3 mins     Stairs HHS 30 sec x 2 B  Knee flexion stretch 30 sec x 2 B            T.G. Cable Column    Mat HEP      Step-Ups     Balance // Bars                         [employer]  Leg press 80# 2x10  Hamstring curl 30# 2x10  Leg extension 30# 2 x10  TRX squats 2x10    27 steps to/from [employer]                     Other Therapeutic Activities:  Pt was educated on PT POC, Diagnosis, Prognosis, pathomechanics, as well as treatment goals and options, and frequency/duration of scheduling future physical therapy appointments. Time was also taken on this day to answer all patient questions involving their participation in PT      Home Exercise Program:  Pt was educated on  HEP including distribution of handout describing exercises, sets, repetitions, frequency and intensity.  Exercises/activities include: SAQ, Hamstring stretch, SLR 3-way, hip add isometrics     Manual Treatments:      Modalities: Timed Code Treatment Minutes: 29    Total Treatment Minutes:  29    Treatment/Activity Tolerance:  [x] Patient tolerated treatment well [] Patient limited by fatigue  [] Patient limited by pain  [] Patient limited by other medical complications  [] Other:     Prognosis: [] Good [x] Fair  [] Poor    Patient Requires Follow-up: [x] Yes  [] No    Plan:   [x] Continue per plan of care [] Alter current plan (see comments)  [] Plan of care initiated [] Hold pending MD visit [] Discharge    Plan for Next Session:   Progress note next visit. See above. Eliminate pain with ambulation     Electronically signed by:  Regla Sousa [facility]  Therapist was present, directed the patient's care, made skilled judgement, and was responsible for assessment and treatment of the patient.

## 2018-10-31 ENCOUNTER — HOSPITAL ENCOUNTER (OUTPATIENT)
Dept: PHYSICAL THERAPY | Age: 73
Setting detail: THERAPIES SERIES
Discharge: HOME | End: 2018-10-31
Payer: MEDICARE

## 2018-10-31 PROCEDURE — G8978 MOBILITY CURRENT STATUS: HCPCS

## 2018-10-31 PROCEDURE — 97530 THERAPEUTIC ACTIVITIES: CPT

## 2018-10-31 PROCEDURE — G8979 MOBILITY GOAL STATUS: HCPCS

## 2018-10-31 ASSESSMENT — PAIN SCALES - GENERAL: PAINLEVEL_OUTOF10: 1

## 2018-10-31 ASSESSMENT — PAIN DESCRIPTION - LOCATION: LOCATION: KNEE

## 2018-10-31 ASSESSMENT — PAIN DESCRIPTION - PAIN TYPE: TYPE: ACUTE PAIN

## 2018-10-31 ASSESSMENT — PAIN DESCRIPTION - FREQUENCY: FREQUENCY: INTERMITTENT

## 2018-10-31 ASSESSMENT — PAIN DESCRIPTION - ORIENTATION: ORIENTATION: LEFT

## 2018-10-31 NOTE — FLOWSHEET NOTE
Physical Therapy Daily Treatment Note  Date:  10/31/2018    Patient Name:  Jonathan Conner    :  1945  MRN: [id number]  Restrictions/Precautions:    Medical/Treatment Diagnosis Information:   · Diagnosis: Bilateral primary OA of knee M17.0   · Treatment Diagnosis: Decreased hip/knee strength, impaired balance, diff with gait     Tracking Information:  Physician Information Referring Practitioner: Smith Tafoya     Plan of Care Sent Date:  10/10/18  Signed Received:    Visit Count / Total Visits  +      Insurance Approved Visits  /  Approved Dates:     Insurance Information PT Insurance Information: Medicare      Progress Note/G-codes   []  Yes  [x]  No Next Due: Next visit     Pain level: 1/10     Subjective:   Pt reports she's wore out today. Says she was tired after last visit because she went home a raked leaves. Thinks she just over does stuff and that's when she has pain. States she's not limited in anything. Reports she feels like she's improved since starting PT to 100% improvement. Objective:   Observation:  See Progress note  Test measurements:      Exercises:  Exercise/Equipment Resistance/Repetitions Other comments   Bike Bike #2, seat 3, level 2 x 3 mins     Stairs HHS 30 sec x 2 B  Knee flexion stretch 30 sec x 2 B            T.G. Cable Column    Mat HEP      Step-Ups     Balance // Bars                         [facility]  Leg press 80# 2x10  Hamstring curl 30# 2x10  Leg extension 30# 2 x10  TRX squats 2x10    27 steps to/from [facility]                     Other Therapeutic Activities:  Pt was educated on PT POC, Diagnosis, Prognosis, pathomechanics, as well as treatment goals and options, and frequency/duration of scheduling future physical therapy appointments. Time was also taken on this day to answer all patient questions involving their participation in PT  10/31.   Issued pt 30 day complimentary [facility] membership      Home Exercise Program:  Pt was educated on  HEP including distribution of handout describing exercises, sets, repetitions, frequency and intensity. Exercises/activities include: SAQ, Hamstring stretch, SLR 3-way, hip add isometrics 10/31. Updated pt's HEP, discussed, demonstrated IND    Manual Treatments:      Modalities:      Timed Code Treatment Minutes: 30    Total Treatment Minutes:  30    Treatment/Activity Tolerance:  [x] Patient tolerated treatment well [] Patient limited by fatigue  [] Patient limited by pain  [] Patient limited by other medical complications  [] Other:     Prognosis: [] Good [x] Fair  [] Poor    Patient Requires Follow-up: [] Yes  [x] No    Plan:   [] Continue per plan of care [] Alter current plan (see comments)  [] Plan of care initiated [] Hold pending MD visit [x] Discharge 10/31/18    Plan for Next Session:  Pt discharged at this date with goals met. Electronically signed by:  Loretta Mercado [facility]  Therapist was present, directed the patient's care, made skilled judgement, and was responsible for assessment and treatment of the patient.

## 2019-01-04 ENCOUNTER — OFFICE VISIT (OUTPATIENT)
Dept: FAMILY MEDICINE CLINIC | Age: 74
End: 2019-01-04
Payer: MEDICARE

## 2019-01-04 VITALS
HEIGHT: 62 IN | DIASTOLIC BLOOD PRESSURE: 64 MMHG | HEART RATE: 76 BPM | BODY MASS INDEX: 24.07 KG/M2 | WEIGHT: 130.8 LBS | RESPIRATION RATE: 16 BRPM | SYSTOLIC BLOOD PRESSURE: 130 MMHG

## 2019-01-04 DIAGNOSIS — E03.9 ACQUIRED HYPOTHYROIDISM: ICD-10-CM

## 2019-01-04 DIAGNOSIS — E78.00 HYPERCHOLESTEREMIA: Primary | ICD-10-CM

## 2019-01-04 DIAGNOSIS — Z87.448 HISTORY OF URETHRAL STRICTURE: ICD-10-CM

## 2019-01-04 DIAGNOSIS — M81.0 AGE-RELATED OSTEOPOROSIS WITHOUT CURRENT PATHOLOGICAL FRACTURE: ICD-10-CM

## 2019-01-04 DIAGNOSIS — I65.21 ATHEROSCLEROSIS OF RIGHT CAROTID ARTERY: ICD-10-CM

## 2019-01-04 LAB
A/G RATIO: 1.9 (ref 1.1–2.2)
ALBUMIN SERPL-MCNC: 5.1 G/DL (ref 3.4–5)
ALP BLD-CCNC: 107 U/L (ref 40–129)
ALT SERPL-CCNC: 16 U/L (ref 10–40)
ANION GAP SERPL CALCULATED.3IONS-SCNC: 16 MMOL/L (ref 3–16)
AST SERPL-CCNC: 20 U/L (ref 15–37)
BILIRUB SERPL-MCNC: 0.5 MG/DL (ref 0–1)
BILIRUBIN, POC: NORMAL
BLOOD URINE, POC: NORMAL
BUN BLDV-MCNC: 7 MG/DL (ref 7–20)
CALCIUM SERPL-MCNC: 10.1 MG/DL (ref 8.3–10.6)
CHLORIDE BLD-SCNC: 96 MMOL/L (ref 99–110)
CHOLESTEROL, TOTAL: 264 MG/DL (ref 0–199)
CLARITY, POC: CLEAR
CO2: 24 MMOL/L (ref 21–32)
COLOR, POC: YELLOW
CREAT SERPL-MCNC: 0.6 MG/DL (ref 0.6–1.2)
GFR AFRICAN AMERICAN: >60
GFR NON-AFRICAN AMERICAN: >60
GLOBULIN: 2.7 G/DL
GLUCOSE BLD-MCNC: 102 MG/DL (ref 70–99)
GLUCOSE URINE, POC: NORMAL
HDLC SERPL-MCNC: 93 MG/DL (ref 40–60)
KETONES, POC: NORMAL
LDL CHOLESTEROL CALCULATED: 152 MG/DL
LEUKOCYTE EST, POC: NORMAL
NITRITE, POC: NORMAL
PH, POC: 7
POTASSIUM SERPL-SCNC: 4.2 MMOL/L (ref 3.5–5.1)
PROTEIN, POC: NORMAL
SODIUM BLD-SCNC: 136 MMOL/L (ref 136–145)
SPECIFIC GRAVITY, POC: 1.01
T4 FREE: 1.1 NG/DL (ref 0.9–1.8)
TOTAL PROTEIN: 7.8 G/DL (ref 6.4–8.2)
TRIGL SERPL-MCNC: 94 MG/DL (ref 0–150)
TSH REFLEX: 0.93 UIU/ML (ref 0.27–4.2)
UROBILINOGEN, POC: 0.2
VLDLC SERPL CALC-MCNC: 19 MG/DL

## 2019-01-04 PROCEDURE — 3017F COLORECTAL CA SCREEN DOC REV: CPT | Performed by: FAMILY MEDICINE

## 2019-01-04 PROCEDURE — G8599 NO ASA/ANTIPLAT THER USE RNG: HCPCS | Performed by: FAMILY MEDICINE

## 2019-01-04 PROCEDURE — G8399 PT W/DXA RESULTS DOCUMENT: HCPCS | Performed by: FAMILY MEDICINE

## 2019-01-04 PROCEDURE — 4040F PNEUMOC VAC/ADMIN/RCVD: CPT | Performed by: FAMILY MEDICINE

## 2019-01-04 PROCEDURE — 1036F TOBACCO NON-USER: CPT | Performed by: FAMILY MEDICINE

## 2019-01-04 PROCEDURE — G8482 FLU IMMUNIZE ORDER/ADMIN: HCPCS | Performed by: FAMILY MEDICINE

## 2019-01-04 PROCEDURE — G8420 CALC BMI NORM PARAMETERS: HCPCS | Performed by: FAMILY MEDICINE

## 2019-01-04 PROCEDURE — G8427 DOCREV CUR MEDS BY ELIG CLIN: HCPCS | Performed by: FAMILY MEDICINE

## 2019-01-04 PROCEDURE — 99214 OFFICE O/P EST MOD 30 MIN: CPT | Performed by: FAMILY MEDICINE

## 2019-01-04 PROCEDURE — 1101F PT FALLS ASSESS-DOCD LE1/YR: CPT | Performed by: FAMILY MEDICINE

## 2019-01-04 PROCEDURE — 81002 URINALYSIS NONAUTO W/O SCOPE: CPT | Performed by: FAMILY MEDICINE

## 2019-01-04 PROCEDURE — 36415 COLL VENOUS BLD VENIPUNCTURE: CPT | Performed by: FAMILY MEDICINE

## 2019-01-04 PROCEDURE — 1123F ACP DISCUSS/DSCN MKR DOCD: CPT | Performed by: FAMILY MEDICINE

## 2019-01-04 PROCEDURE — 1090F PRES/ABSN URINE INCON ASSESS: CPT | Performed by: FAMILY MEDICINE

## 2019-01-04 RX ORDER — LEVOTHYROXINE AND LIOTHYRONINE 57; 13.5 UG/1; UG/1
TABLET ORAL
Qty: 30 TABLET | Refills: 11 | Status: SHIPPED | OUTPATIENT
Start: 2019-01-04 | End: 2020-02-11

## 2019-01-04 RX ORDER — ALENDRONATE SODIUM 70 MG/1
70 TABLET ORAL
Qty: 4 TABLET | Refills: 11 | Status: SHIPPED | OUTPATIENT
Start: 2019-01-04 | End: 2019-03-14 | Stop reason: ALTCHOICE

## 2019-01-04 RX ORDER — AMOXICILLIN 500 MG
CAPSULE ORAL
COMMUNITY

## 2019-01-08 ENCOUNTER — OFFICE VISIT (OUTPATIENT)
Dept: ENT CLINIC | Age: 74
End: 2019-01-08
Payer: MEDICARE

## 2019-01-08 ENCOUNTER — TELEPHONE (OUTPATIENT)
Dept: ENT CLINIC | Age: 74
End: 2019-01-08

## 2019-01-08 VITALS — HEART RATE: 72 BPM | OXYGEN SATURATION: 95 % | DIASTOLIC BLOOD PRESSURE: 70 MMHG | SYSTOLIC BLOOD PRESSURE: 122 MMHG

## 2019-01-08 DIAGNOSIS — H61.22 IMPACTED CERUMEN OF LEFT EAR: Primary | ICD-10-CM

## 2019-01-08 PROCEDURE — G8482 FLU IMMUNIZE ORDER/ADMIN: HCPCS | Performed by: OTOLARYNGOLOGY

## 2019-01-08 PROCEDURE — 1036F TOBACCO NON-USER: CPT | Performed by: OTOLARYNGOLOGY

## 2019-01-08 PROCEDURE — G8399 PT W/DXA RESULTS DOCUMENT: HCPCS | Performed by: OTOLARYNGOLOGY

## 2019-01-08 PROCEDURE — 1090F PRES/ABSN URINE INCON ASSESS: CPT | Performed by: OTOLARYNGOLOGY

## 2019-01-08 PROCEDURE — 3017F COLORECTAL CA SCREEN DOC REV: CPT | Performed by: OTOLARYNGOLOGY

## 2019-01-08 PROCEDURE — 4040F PNEUMOC VAC/ADMIN/RCVD: CPT | Performed by: OTOLARYNGOLOGY

## 2019-01-08 PROCEDURE — 1123F ACP DISCUSS/DSCN MKR DOCD: CPT | Performed by: OTOLARYNGOLOGY

## 2019-01-08 PROCEDURE — 1101F PT FALLS ASSESS-DOCD LE1/YR: CPT | Performed by: OTOLARYNGOLOGY

## 2019-01-08 PROCEDURE — G8427 DOCREV CUR MEDS BY ELIG CLIN: HCPCS | Performed by: OTOLARYNGOLOGY

## 2019-01-08 PROCEDURE — G8420 CALC BMI NORM PARAMETERS: HCPCS | Performed by: OTOLARYNGOLOGY

## 2019-01-08 PROCEDURE — G8599 NO ASA/ANTIPLAT THER USE RNG: HCPCS | Performed by: OTOLARYNGOLOGY

## 2019-01-08 PROCEDURE — 69210 REMOVE IMPACTED EAR WAX UNI: CPT | Performed by: OTOLARYNGOLOGY

## 2019-01-10 ENCOUNTER — OFFICE VISIT (OUTPATIENT)
Dept: FAMILY MEDICINE CLINIC | Age: 74
End: 2019-01-10
Payer: MEDICARE

## 2019-01-10 VITALS
RESPIRATION RATE: 18 BRPM | DIASTOLIC BLOOD PRESSURE: 70 MMHG | HEART RATE: 80 BPM | SYSTOLIC BLOOD PRESSURE: 138 MMHG | WEIGHT: 132 LBS | BODY MASS INDEX: 24.29 KG/M2 | OXYGEN SATURATION: 98 % | TEMPERATURE: 97.8 F | HEIGHT: 62 IN

## 2019-01-10 DIAGNOSIS — S00.412A EAR CANAL ABRASION, LEFT, INITIAL ENCOUNTER: Primary | ICD-10-CM

## 2019-01-10 PROCEDURE — 1036F TOBACCO NON-USER: CPT | Performed by: NURSE PRACTITIONER

## 2019-01-10 PROCEDURE — G8599 NO ASA/ANTIPLAT THER USE RNG: HCPCS | Performed by: NURSE PRACTITIONER

## 2019-01-10 PROCEDURE — 99213 OFFICE O/P EST LOW 20 MIN: CPT | Performed by: NURSE PRACTITIONER

## 2019-01-10 PROCEDURE — 1101F PT FALLS ASSESS-DOCD LE1/YR: CPT | Performed by: NURSE PRACTITIONER

## 2019-01-10 PROCEDURE — G8399 PT W/DXA RESULTS DOCUMENT: HCPCS | Performed by: NURSE PRACTITIONER

## 2019-01-10 PROCEDURE — G8482 FLU IMMUNIZE ORDER/ADMIN: HCPCS | Performed by: NURSE PRACTITIONER

## 2019-01-10 PROCEDURE — 1090F PRES/ABSN URINE INCON ASSESS: CPT | Performed by: NURSE PRACTITIONER

## 2019-01-10 PROCEDURE — 4040F PNEUMOC VAC/ADMIN/RCVD: CPT | Performed by: NURSE PRACTITIONER

## 2019-01-10 PROCEDURE — G8420 CALC BMI NORM PARAMETERS: HCPCS | Performed by: NURSE PRACTITIONER

## 2019-01-10 PROCEDURE — 3017F COLORECTAL CA SCREEN DOC REV: CPT | Performed by: NURSE PRACTITIONER

## 2019-01-10 PROCEDURE — 1123F ACP DISCUSS/DSCN MKR DOCD: CPT | Performed by: NURSE PRACTITIONER

## 2019-01-10 PROCEDURE — G8427 DOCREV CUR MEDS BY ELIG CLIN: HCPCS | Performed by: NURSE PRACTITIONER

## 2019-01-23 ENCOUNTER — OFFICE VISIT (OUTPATIENT)
Dept: FAMILY MEDICINE CLINIC | Age: 74
End: 2019-01-23
Payer: MEDICARE

## 2019-01-23 VITALS
DIASTOLIC BLOOD PRESSURE: 64 MMHG | HEART RATE: 85 BPM | TEMPERATURE: 97.6 F | BODY MASS INDEX: 24.48 KG/M2 | WEIGHT: 133 LBS | HEIGHT: 62 IN | SYSTOLIC BLOOD PRESSURE: 120 MMHG | OXYGEN SATURATION: 97 %

## 2019-01-23 DIAGNOSIS — S00.412D ABRASION OF LEFT EAR CANAL, SUBSEQUENT ENCOUNTER: Primary | ICD-10-CM

## 2019-01-23 PROCEDURE — G8427 DOCREV CUR MEDS BY ELIG CLIN: HCPCS | Performed by: NURSE PRACTITIONER

## 2019-01-23 PROCEDURE — G8482 FLU IMMUNIZE ORDER/ADMIN: HCPCS | Performed by: NURSE PRACTITIONER

## 2019-01-23 PROCEDURE — 1123F ACP DISCUSS/DSCN MKR DOCD: CPT | Performed by: NURSE PRACTITIONER

## 2019-01-23 PROCEDURE — 1090F PRES/ABSN URINE INCON ASSESS: CPT | Performed by: NURSE PRACTITIONER

## 2019-01-23 PROCEDURE — 1036F TOBACCO NON-USER: CPT | Performed by: NURSE PRACTITIONER

## 2019-01-23 PROCEDURE — 4040F PNEUMOC VAC/ADMIN/RCVD: CPT | Performed by: NURSE PRACTITIONER

## 2019-01-23 PROCEDURE — G8599 NO ASA/ANTIPLAT THER USE RNG: HCPCS | Performed by: NURSE PRACTITIONER

## 2019-01-23 PROCEDURE — G8399 PT W/DXA RESULTS DOCUMENT: HCPCS | Performed by: NURSE PRACTITIONER

## 2019-01-23 PROCEDURE — 1101F PT FALLS ASSESS-DOCD LE1/YR: CPT | Performed by: NURSE PRACTITIONER

## 2019-01-23 PROCEDURE — 99212 OFFICE O/P EST SF 10 MIN: CPT | Performed by: NURSE PRACTITIONER

## 2019-01-23 PROCEDURE — G8420 CALC BMI NORM PARAMETERS: HCPCS | Performed by: NURSE PRACTITIONER

## 2019-01-23 PROCEDURE — 3017F COLORECTAL CA SCREEN DOC REV: CPT | Performed by: NURSE PRACTITIONER

## 2019-03-14 ENCOUNTER — OFFICE VISIT (OUTPATIENT)
Dept: FAMILY MEDICINE CLINIC | Age: 74
End: 2019-03-14
Payer: MEDICARE

## 2019-03-14 VITALS
DIASTOLIC BLOOD PRESSURE: 64 MMHG | OXYGEN SATURATION: 98 % | SYSTOLIC BLOOD PRESSURE: 110 MMHG | BODY MASS INDEX: 24.22 KG/M2 | HEART RATE: 80 BPM | RESPIRATION RATE: 18 BRPM | TEMPERATURE: 97.6 F | HEIGHT: 62 IN | WEIGHT: 131.6 LBS

## 2019-03-14 DIAGNOSIS — S00.412D ABRASION OF LEFT EAR CANAL, SUBSEQUENT ENCOUNTER: Primary | ICD-10-CM

## 2019-03-14 PROCEDURE — 1101F PT FALLS ASSESS-DOCD LE1/YR: CPT | Performed by: NURSE PRACTITIONER

## 2019-03-14 PROCEDURE — 99212 OFFICE O/P EST SF 10 MIN: CPT | Performed by: NURSE PRACTITIONER

## 2019-03-14 PROCEDURE — 4040F PNEUMOC VAC/ADMIN/RCVD: CPT | Performed by: NURSE PRACTITIONER

## 2019-03-14 PROCEDURE — 1036F TOBACCO NON-USER: CPT | Performed by: NURSE PRACTITIONER

## 2019-03-14 PROCEDURE — G8420 CALC BMI NORM PARAMETERS: HCPCS | Performed by: NURSE PRACTITIONER

## 2019-03-14 PROCEDURE — G8599 NO ASA/ANTIPLAT THER USE RNG: HCPCS | Performed by: NURSE PRACTITIONER

## 2019-03-14 PROCEDURE — G8482 FLU IMMUNIZE ORDER/ADMIN: HCPCS | Performed by: NURSE PRACTITIONER

## 2019-03-14 PROCEDURE — G8399 PT W/DXA RESULTS DOCUMENT: HCPCS | Performed by: NURSE PRACTITIONER

## 2019-03-14 PROCEDURE — 1090F PRES/ABSN URINE INCON ASSESS: CPT | Performed by: NURSE PRACTITIONER

## 2019-03-14 PROCEDURE — G8427 DOCREV CUR MEDS BY ELIG CLIN: HCPCS | Performed by: NURSE PRACTITIONER

## 2019-03-14 PROCEDURE — 1123F ACP DISCUSS/DSCN MKR DOCD: CPT | Performed by: NURSE PRACTITIONER

## 2019-03-14 PROCEDURE — 3017F COLORECTAL CA SCREEN DOC REV: CPT | Performed by: NURSE PRACTITIONER

## 2019-03-14 ASSESSMENT — PATIENT HEALTH QUESTIONNAIRE - PHQ9
SUM OF ALL RESPONSES TO PHQ9 QUESTIONS 1 & 2: 0
2. FEELING DOWN, DEPRESSED OR HOPELESS: 0
SUM OF ALL RESPONSES TO PHQ QUESTIONS 1-9: 0
SUM OF ALL RESPONSES TO PHQ QUESTIONS 1-9: 0
1. LITTLE INTEREST OR PLEASURE IN DOING THINGS: 0

## 2019-03-19 ENCOUNTER — TELEPHONE (OUTPATIENT)
Dept: FAMILY MEDICINE CLINIC | Age: 74
End: 2019-03-19

## 2019-04-22 ENCOUNTER — OFFICE VISIT (OUTPATIENT)
Dept: FAMILY MEDICINE CLINIC | Age: 74
End: 2019-04-22
Payer: MEDICARE

## 2019-04-22 VITALS
BODY MASS INDEX: 23.92 KG/M2 | WEIGHT: 130 LBS | RESPIRATION RATE: 18 BRPM | DIASTOLIC BLOOD PRESSURE: 68 MMHG | HEART RATE: 64 BPM | SYSTOLIC BLOOD PRESSURE: 110 MMHG | HEIGHT: 62 IN

## 2019-04-22 DIAGNOSIS — S00.412D: ICD-10-CM

## 2019-04-22 PROBLEM — S00.412A ABRASION OF LEFT EAR: Status: ACTIVE | Noted: 2019-04-22

## 2019-04-22 PROCEDURE — 1090F PRES/ABSN URINE INCON ASSESS: CPT | Performed by: NURSE PRACTITIONER

## 2019-04-22 PROCEDURE — 1036F TOBACCO NON-USER: CPT | Performed by: NURSE PRACTITIONER

## 2019-04-22 PROCEDURE — 4040F PNEUMOC VAC/ADMIN/RCVD: CPT | Performed by: NURSE PRACTITIONER

## 2019-04-22 PROCEDURE — G8399 PT W/DXA RESULTS DOCUMENT: HCPCS | Performed by: NURSE PRACTITIONER

## 2019-04-22 PROCEDURE — 3017F COLORECTAL CA SCREEN DOC REV: CPT | Performed by: NURSE PRACTITIONER

## 2019-04-22 PROCEDURE — 1123F ACP DISCUSS/DSCN MKR DOCD: CPT | Performed by: NURSE PRACTITIONER

## 2019-04-22 PROCEDURE — G8427 DOCREV CUR MEDS BY ELIG CLIN: HCPCS | Performed by: NURSE PRACTITIONER

## 2019-04-22 PROCEDURE — G8599 NO ASA/ANTIPLAT THER USE RNG: HCPCS | Performed by: NURSE PRACTITIONER

## 2019-04-22 PROCEDURE — 99213 OFFICE O/P EST LOW 20 MIN: CPT | Performed by: NURSE PRACTITIONER

## 2019-04-22 PROCEDURE — G8420 CALC BMI NORM PARAMETERS: HCPCS | Performed by: NURSE PRACTITIONER

## 2019-05-03 ENCOUNTER — OFFICE VISIT (OUTPATIENT)
Dept: FAMILY MEDICINE CLINIC | Age: 74
End: 2019-05-03
Payer: MEDICARE

## 2019-05-03 VITALS
TEMPERATURE: 98.4 F | BODY MASS INDEX: 21.66 KG/M2 | DIASTOLIC BLOOD PRESSURE: 68 MMHG | RESPIRATION RATE: 18 BRPM | WEIGHT: 130 LBS | HEART RATE: 100 BPM | HEIGHT: 65 IN | SYSTOLIC BLOOD PRESSURE: 116 MMHG | OXYGEN SATURATION: 98 %

## 2019-05-03 DIAGNOSIS — M25.551 RIGHT HIP PAIN: ICD-10-CM

## 2019-05-03 DIAGNOSIS — E07.89 OTHER SPECIFIED DISORDERS OF THYROID: ICD-10-CM

## 2019-05-03 DIAGNOSIS — Z01.818 PREOP EXAMINATION: Primary | ICD-10-CM

## 2019-05-03 LAB
ANION GAP SERPL CALCULATED.3IONS-SCNC: 9 MMOL/L (ref 3–16)
APTT: 28.4 SEC (ref 26–36)
BASOPHILS ABSOLUTE: 0.1 K/UL (ref 0–0.2)
BASOPHILS RELATIVE PERCENT: 0.8 %
BILIRUBIN, POC: NEGATIVE
BLOOD URINE, POC: NEGATIVE
BUN BLDV-MCNC: 12 MG/DL (ref 7–20)
CALCIUM SERPL-MCNC: 9.3 MG/DL (ref 8.3–10.6)
CHLORIDE BLD-SCNC: 99 MMOL/L (ref 99–110)
CLARITY, POC: CLEAR
CO2: 28 MMOL/L (ref 21–32)
COLOR, POC: NORMAL
CREAT SERPL-MCNC: 0.5 MG/DL (ref 0.6–1.2)
EOSINOPHILS ABSOLUTE: 0.1 K/UL (ref 0–0.6)
EOSINOPHILS RELATIVE PERCENT: 1 %
GFR AFRICAN AMERICAN: >60
GFR NON-AFRICAN AMERICAN: >60
GLUCOSE BLD-MCNC: 95 MG/DL (ref 70–99)
GLUCOSE URINE, POC: NEGATIVE
HCT VFR BLD CALC: 40.5 % (ref 36–48)
HEMOGLOBIN: 13.4 G/DL (ref 12–16)
INR BLD: 0.89 (ref 0.86–1.14)
KETONES, POC: NEGATIVE
LEUKOCYTE EST, POC: NEGATIVE
LYMPHOCYTES ABSOLUTE: 1.7 K/UL (ref 1–5.1)
LYMPHOCYTES RELATIVE PERCENT: 22.3 %
MCH RBC QN AUTO: 31.5 PG (ref 26–34)
MCHC RBC AUTO-ENTMCNC: 33.1 G/DL (ref 31–36)
MCV RBC AUTO: 95.2 FL (ref 80–100)
MONOCYTES ABSOLUTE: 0.6 K/UL (ref 0–1.3)
MONOCYTES RELATIVE PERCENT: 8 %
NEUTROPHILS ABSOLUTE: 5.2 K/UL (ref 1.7–7.7)
NEUTROPHILS RELATIVE PERCENT: 67.9 %
NITRITE, POC: NEGATIVE
PDW BLD-RTO: 14.2 % (ref 12.4–15.4)
PH, POC: 6
PLATELET # BLD: 223 K/UL (ref 135–450)
PMV BLD AUTO: 9.4 FL (ref 5–10.5)
POTASSIUM SERPL-SCNC: 4.6 MMOL/L (ref 3.5–5.1)
PROTEIN, POC: NEGATIVE
PROTHROMBIN TIME: 10.2 SEC (ref 9.8–13)
RBC # BLD: 4.26 M/UL (ref 4–5.2)
SODIUM BLD-SCNC: 136 MMOL/L (ref 136–145)
SPECIFIC GRAVITY, POC: 1.01
UROBILINOGEN, POC: 0.2
WBC # BLD: 7.6 K/UL (ref 4–11)

## 2019-05-03 PROCEDURE — 1036F TOBACCO NON-USER: CPT | Performed by: NURSE PRACTITIONER

## 2019-05-03 PROCEDURE — 93000 ELECTROCARDIOGRAM COMPLETE: CPT | Performed by: NURSE PRACTITIONER

## 2019-05-03 PROCEDURE — G8420 CALC BMI NORM PARAMETERS: HCPCS | Performed by: NURSE PRACTITIONER

## 2019-05-03 PROCEDURE — 1123F ACP DISCUSS/DSCN MKR DOCD: CPT | Performed by: NURSE PRACTITIONER

## 2019-05-03 PROCEDURE — G8427 DOCREV CUR MEDS BY ELIG CLIN: HCPCS | Performed by: NURSE PRACTITIONER

## 2019-05-03 PROCEDURE — 4040F PNEUMOC VAC/ADMIN/RCVD: CPT | Performed by: NURSE PRACTITIONER

## 2019-05-03 PROCEDURE — G8399 PT W/DXA RESULTS DOCUMENT: HCPCS | Performed by: NURSE PRACTITIONER

## 2019-05-03 PROCEDURE — 36415 COLL VENOUS BLD VENIPUNCTURE: CPT | Performed by: NURSE PRACTITIONER

## 2019-05-03 PROCEDURE — G8599 NO ASA/ANTIPLAT THER USE RNG: HCPCS | Performed by: NURSE PRACTITIONER

## 2019-05-03 PROCEDURE — 3017F COLORECTAL CA SCREEN DOC REV: CPT | Performed by: NURSE PRACTITIONER

## 2019-05-03 PROCEDURE — 81002 URINALYSIS NONAUTO W/O SCOPE: CPT | Performed by: NURSE PRACTITIONER

## 2019-05-03 PROCEDURE — 1090F PRES/ABSN URINE INCON ASSESS: CPT | Performed by: NURSE PRACTITIONER

## 2019-05-03 PROCEDURE — 99214 OFFICE O/P EST MOD 30 MIN: CPT | Performed by: NURSE PRACTITIONER

## 2019-05-03 NOTE — PATIENT INSTRUCTIONS
Change in medication regimen before surgery: Discontinue NSAIDs (Ibuprofen aleve) 7 days before surgery. Last doses on May 13th.    Stop all over the counter nutritional supplements, vitamin E and fish oil 14 days prior to the surgery (next Monday will be the last day to take May 6 th)

## 2019-05-03 NOTE — PROGRESS NOTES
[facility]  Preoperative Consultation  Alex Mendez 40  YOB: 1945    Chief Complaint   Patient presents with    Pre-op Exam     PT HERE FOR PRE OP EXAMINATION AT THE REQUEST OF DR. WONG FOR HER RIGHT TOTAL HIP ARTHOPLASTY TO BE DONE ON 5/21/19 AT [facility]. Allergies   Allergen Reactions    Alendronate      Stomach upset    Doxycycline Other (See Comments)     Abdominal pain    Penicillins Hives    Sulfa Antibiotics     Zithromax [Azithromycin]      Current Outpatient Medications   Medication Sig Dispense Refill    MISC NATURAL PRODUCTS PO Take 1 capsule by mouth 3 times daily ALPHA BASE CAPS WITHOUT IRON      MISC NATURAL PRODUCTS PO Take 1 tablet by mouth daily 4SIGHT      MISC NATURAL PRODUCTS PO Take 2 tablets by mouth daily COSMEDIX      MISC NATURAL PRODUCTS PO Take 3 tablets by mouth daily OSTEOBASE      MISC NATURAL PRODUCTS PO Take 1 tablet by mouth daily ORTHO DIGESTZYME      MISC NATURAL PRODUCTS PO Take 1 tablet by mouth daily DAILY IMMUNE      MISC NATURAL PRODUCTS PO Take 8 oz by mouth daily SMALL-TART RAMIREZ JUICE      MISC NATURAL PRODUCTS PO Take 1 tablet by mouth daily KYOLIC      Omega-3 Fatty Acids (FISH OIL) 1200 MG CAPS Take by mouth      thyroid (ARMOUR THYROID) 90 MG tablet TAKE ONE TABLET BY MOUTH EVERY DAY to every other day as directed 30 tablet 11    acyclovir (ZOVIRAX) 400 MG tablet 400 mg daily       Misc Natural Products CAPS Take 1 capsule by mouth daily. ADRENAL CAPS      Vitamin E 100 UNITS TABS Take 1 tablet by mouth. No current facility-administered medications for this visit. This patient presents to the office today for a preoperative consultation at the request of surgeon. Nickie Chin has scheduled surgery per DR. WONG FOR HER RIGHT TOTAL HIP ARTHOPLASTY TO BE DONE ON 5/21/19 AT [facility]. The current problem began In April this year.    Had shots in the History    Marital status:      Spouse name: Not on file    Number of children: Not on file    Years of education: Not on file    Highest education level: Not on file   Occupational History    Not on file   Social Needs    Financial resource strain: Not on file    Food insecurity:     Worry: Not on file     Inability: Not on file    Transportation needs:     Medical: Not on file     Non-medical: Not on file   Tobacco Use    Smoking status: Never Smoker    Smokeless tobacco: Never Used   Substance and Sexual Activity    Alcohol use: Yes     Alcohol/week: 4.2 oz     Types: 7 Glasses of wine per week    Drug use: No    Sexual activity: Not Currently     Partners: Male   Lifestyle    Physical activity:     Days per week: Not on file     Minutes per session: Not on file    Stress: Not on file   Relationships    Social connections:     Talks on phone: Not on file     Gets together: Not on file     Attends Caodaism service: Not on file     Active member of club or organization: Not on file     Attends meetings of clubs or organizations: Not on file     Relationship status: Not on file    Intimate partner violence:     Fear of current or ex partner: Not on file     Emotionally abused: Not on file     Physically abused: Not on file     Forced sexual activity: Not on file   Other Topics Concern    Not on file   Social History Narrative    Not on file         Vitals:    05/03/19 1119   BP: 116/68   Site: Left Upper Arm   Position: Sitting   Cuff Size: Small Adult   Pulse: 100   Resp: 18   Temp: 98.4 °F (36.9 °C)   TempSrc: Oral   SpO2: 98%   Weight: 130 lb (59 kg)   Height: 5' 5\" (1.651 m)      Wt Readings from Last 3 Encounters:   05/03/19 130 lb (59 kg)   04/22/19 130 lb (59 kg)   03/14/19 131 lb 9.6 oz (59.7 kg)     Body mass index is 21.63 kg/m².    BP Readings from Last 3 Encounters:   05/03/19 116/68   04/22/19 110/68   03/14/19 110/64        Review of Systems  A comprehensive review of systems was negative except for: Musculoskeletal: positive for right hip pain. Physical Exam   Constitutional: Patient is oriented to person, place, and time. She appears well-developed and well-nourished. No distress. HEENT:   Head: Normocephalic and atraumatic. Right Ear: Tympanic membrane, external ear and ear canal normal.   Left Ear: Tympanic membrane, external ear and ear canal normal.   Nose: Nose normal.   Mouth/Throat: Oropharynx is clear and moist, and mucous membranes are normal.  There is no cervical adenopathy. There are no loose teeth. Eyes: Conjunctivae and extraocular motions are normal. Pupils are equal, round, and reactive to light bilaterally. Neck: Neck supple. No mass present. Cardiovascular: Normal rate, regular rhythm, normal heart sounds and intact distal pulses. Exam reveals no gallop and no friction rub. No murmur heard. Pulmonary/Chest: Effort normal and breath sounds normal. No respiratory distress. There are no wheezes, rhonchi or rales. Abdominal: Soft, non-tender. Normal bowel sounds. There is no organomegaly, bruit or palpable mass. Neurological: She is alert and oriented to person, place, and time. She has normal reflexes. Coordination normal.   Musculoskeletal: Pain in the right anterior hip from the arthritis. Skin: Skin is warm and dry. There is no rash or erythema. No suspicious lesions noted. Psychiatric: She has a normal mood and affect. Speech and behavior are normal. Judgment, cognition and memory are normal.     EKG - Attached. Assessment:        Diagnosis Orders   1. Preop examination  Basic Metabolic Panel    APTT    Protime-INR    POCT Urinalysis no Micro    EKG 12 Lead    CBC Auto Differential   2. Right hip pain         75 y.o. patient with planned surgery as above.     Known risk factors for perioperative complications: Hypothyroid, osteoporosis  Current medications which may produce withdrawal symptoms if withheld perioperatively: none Plan:     1. Preoperative workup as follows: ECG, Labs - cbc cmp ptt pt inr UA nasal culture. 2. Change in medication regimen before surgery: Discontinue NSAIDs (Ibuprofen aleve) 7 days before surgery. Last doses on May 13th. Stop all over the counter nutritional supplements, vitamin E and fish oil 14 days prior to the surgery (next Monday will be the last day to take)  3. Prophylaxis for cardiac events with perioperative beta-blockers: Not indicated  ACC/AHA indications for pre-operative beta-blocker use:    · Vascular surgery with history of postitive stress test  · Intermediate or high risk surgery with history of CAD   · Intermediate or high risk surgery with multiple clinical predictors of CAD- 2 of the following: history of compensated or prior heart failure, history of cerebrovascular disease, DM, or renal insufficiency    Routine administration of higher-dose, long-acting metoprolol in beta-blocker-naïve patients on the day of surgery, and in the absence of dose titration is associated with an overall increase in mortality. Beta-blockers should be started days to weeks prior to surgery and titrated to pulse < 70.  4. Deep vein thrombosis prophylaxis: regimen to be chosen by surgical team  5. No contraindications to planned surgery    Will fax preop once on labs back.

## 2019-05-05 LAB — CULTURE NOSE: NORMAL

## 2019-06-03 ENCOUNTER — TELEPHONE (OUTPATIENT)
Dept: FAMILY MEDICINE CLINIC | Age: 74
End: 2019-06-03

## 2019-06-03 NOTE — TELEPHONE ENCOUNTER
[facility]
Needs a verbal order to continue skill nursing for 1 time a week for 4 weeks, monitor surgical pain for hip replacement. Please give her a call back.
Sanjuana Lopez, ADVISED
no fever and no chills.

## 2019-06-17 ENCOUNTER — TELEPHONE (OUTPATIENT)
Dept: FAMILY MEDICINE CLINIC | Age: 74
End: 2019-06-17

## 2019-07-05 ENCOUNTER — OFFICE VISIT (OUTPATIENT)
Dept: FAMILY MEDICINE CLINIC | Age: 74
End: 2019-07-05
Payer: MEDICARE

## 2019-07-05 VITALS
HEIGHT: 62 IN | WEIGHT: 123.4 LBS | DIASTOLIC BLOOD PRESSURE: 70 MMHG | BODY MASS INDEX: 22.71 KG/M2 | OXYGEN SATURATION: 98 % | RESPIRATION RATE: 16 BRPM | HEART RATE: 94 BPM | SYSTOLIC BLOOD PRESSURE: 120 MMHG

## 2019-07-05 DIAGNOSIS — E78.00 HYPERCHOLESTEREMIA: ICD-10-CM

## 2019-07-05 DIAGNOSIS — M70.61 TROCHANTERIC BURSITIS, RIGHT HIP: ICD-10-CM

## 2019-07-05 DIAGNOSIS — M19.90 ARTHRITIS: ICD-10-CM

## 2019-07-05 DIAGNOSIS — E88.09 HYPERPROTEINEMIA: ICD-10-CM

## 2019-07-05 DIAGNOSIS — E03.4 HYPOTHYROIDISM DUE TO ACQUIRED ATROPHY OF THYROID: Primary | ICD-10-CM

## 2019-07-05 DIAGNOSIS — R73.9 HYPERGLYCEMIA: ICD-10-CM

## 2019-07-05 LAB
A/G RATIO: 1.8 (ref 1.1–2.2)
ALBUMIN SERPL-MCNC: 4.4 G/DL (ref 3.4–5)
ALP BLD-CCNC: 133 U/L (ref 40–129)
ALT SERPL-CCNC: 10 U/L (ref 10–40)
ANION GAP SERPL CALCULATED.3IONS-SCNC: 7 MMOL/L (ref 3–16)
AST SERPL-CCNC: 17 U/L (ref 15–37)
BILIRUB SERPL-MCNC: 0.4 MG/DL (ref 0–1)
BUN BLDV-MCNC: 7 MG/DL (ref 7–20)
CALCIUM SERPL-MCNC: 9.9 MG/DL (ref 8.3–10.6)
CHLORIDE BLD-SCNC: 97 MMOL/L (ref 99–110)
CO2: 26 MMOL/L (ref 21–32)
CREAT SERPL-MCNC: 0.6 MG/DL (ref 0.6–1.2)
GFR AFRICAN AMERICAN: >60
GFR NON-AFRICAN AMERICAN: >60
GLOBULIN: 2.5 G/DL
GLUCOSE BLD-MCNC: 100 MG/DL (ref 70–99)
POTASSIUM SERPL-SCNC: 4.5 MMOL/L (ref 3.5–5.1)
SODIUM BLD-SCNC: 130 MMOL/L (ref 136–145)
T4 FREE: 1.2 NG/DL (ref 0.9–1.8)
TOTAL PROTEIN: 6.9 G/DL (ref 6.4–8.2)
TSH SERPL DL<=0.05 MIU/L-ACNC: 0.84 UIU/ML (ref 0.27–4.2)

## 2019-07-05 PROCEDURE — 1090F PRES/ABSN URINE INCON ASSESS: CPT | Performed by: FAMILY MEDICINE

## 2019-07-05 PROCEDURE — G8599 NO ASA/ANTIPLAT THER USE RNG: HCPCS | Performed by: FAMILY MEDICINE

## 2019-07-05 PROCEDURE — 36415 COLL VENOUS BLD VENIPUNCTURE: CPT | Performed by: FAMILY MEDICINE

## 2019-07-05 PROCEDURE — 3017F COLORECTAL CA SCREEN DOC REV: CPT | Performed by: FAMILY MEDICINE

## 2019-07-05 PROCEDURE — 99214 OFFICE O/P EST MOD 30 MIN: CPT | Performed by: FAMILY MEDICINE

## 2019-07-05 PROCEDURE — G8399 PT W/DXA RESULTS DOCUMENT: HCPCS | Performed by: FAMILY MEDICINE

## 2019-07-05 PROCEDURE — 4040F PNEUMOC VAC/ADMIN/RCVD: CPT | Performed by: FAMILY MEDICINE

## 2019-07-05 PROCEDURE — G8427 DOCREV CUR MEDS BY ELIG CLIN: HCPCS | Performed by: FAMILY MEDICINE

## 2019-07-05 PROCEDURE — 1123F ACP DISCUSS/DSCN MKR DOCD: CPT | Performed by: FAMILY MEDICINE

## 2019-07-05 PROCEDURE — G8420 CALC BMI NORM PARAMETERS: HCPCS | Performed by: FAMILY MEDICINE

## 2019-07-05 PROCEDURE — 1036F TOBACCO NON-USER: CPT | Performed by: FAMILY MEDICINE

## 2019-07-16 ENCOUNTER — TELEPHONE (OUTPATIENT)
Dept: FAMILY MEDICINE CLINIC | Age: 74
End: 2019-07-16

## 2019-07-25 ENCOUNTER — HOSPITAL ENCOUNTER (OUTPATIENT)
Dept: PHYSICAL THERAPY | Age: 74
Setting detail: THERAPIES SERIES
Discharge: HOME | End: 2019-07-25
Payer: MEDICARE

## 2019-07-25 PROCEDURE — 97110 THERAPEUTIC EXERCISES: CPT | Performed by: PHYSICAL THERAPIST

## 2019-07-25 PROCEDURE — 97530 THERAPEUTIC ACTIVITIES: CPT | Performed by: PHYSICAL THERAPIST

## 2019-07-25 PROCEDURE — 97161 PT EVAL LOW COMPLEX 20 MIN: CPT | Performed by: PHYSICAL THERAPIST

## 2019-07-25 NOTE — PLAN OF CARE
include: thermal agents, E-stim, Biofeedback, US, iontophoresis as indicated  [x] Patient education on joint protection, postural re-education, activity modification, progression of HEP. HEP instruction: Written HEP instructions provided and reviewed (see scanned image in ). GOALS:  Patient stated goal: return to yardwork and working at [employer] goals for Patient:   Short Term Goals: To be achieved in: 2 weeks  1. Independent in HEP and progression per patient tolerance, in order to prevent re-injury. 2. Patient will have a decrease in pain to facilitate improvement in movement, function, and ADLs as indicated by Functional Deficits. Long Term Goals: To be achieved in: 6-8 weeks  1. Disability index score of 30% or less for the LEFS to assist with reaching prior level of function. 2. Patient will demonstrate increased AROM to [facility] without pain to allow for proper joint functioning as indicated by patients Functional Deficits. 3. Patient will demonstrate an increase in Strength to at least 4+/5 throughout as well as good proximal hip strength and control to allow for proper functional mobility as indicated by patients Functional Deficits. 4. Patient will return to functional activities including walking without increased symptoms or restriction. 5. Patient will return to yardwork without increased symptoms or restriction.       Electronically signed by:  Cecilia Jack PT

## 2019-07-25 NOTE — FLOWSHEET NOTE
[address] [facility]    Physical Therapy Daily Treatment Note  Date:  2019    Patient Name:  Bess Rendon    :  1945  MRN: [id number]  Medical/Treatment Diagnosis Information:  · Diagnosis: P70.467 (ICD-10-CM) - Presence of right artificial hip joint  · Treatment Diagnosis: R26.2 difficulty walking; R53.1 weakness  Insurance/Certification information:  PT Insurance Information: Medicare  Physician Information:  Referring Practitioner: Dr. Jason Echavarria of care signed (Y/N):     Date of Patient follow up with Physician:     Progress Note: [x]  Yes  []  No  Next due by: Visit #10       Latex Allergy:  [x]NO      []YES  Preferred Language for Healthcare:   [x]English       []other:    Visit # Insurance Allowable Date Range   1 Medicare Guidelines n/a     Pain level:  6.5/10     SUBJECTIVE:  See eval    OBJECTIVE: See eval      RESTRICTIONS/PRECAUTIONS: s/p R INDIRA 19    Exercises/Interventions:     Therapeutic Exercise (21486) x17' Resistance / level Sets/sec Reps Notes / Cues   Seated in chair HS stretch  30\" 3           SLR - flexion npv      bridging W/ BS 5\" 10    Standing hip abduction  3 10    LAQ  3 10    Standing HS curls  3 10    Calf raises npv                           Therapeutic Activities (96340) x10'       Pt. education  8'  See below   Sit to stand chair 1  ^NPV 10                  Neuromuscular Re-ed (86248)                            Manual Intervention ([id number])       Knee mobs/PROM       Tib/Fem Mobs       Patella Mobs       Ankle mobs                         Pt. Education:  -pt educated on diagnosis, prognosis and expectations for rehab  -pt provided with written and illustrated instructions for HEP  -all pt questions were answered  -educated on activity modification and monitoring  -educated on scar tissue massage    Therapeutic Exercise and NMR EXR  [x] (90120) Provided verbal/tactile cueing for activities related to

## 2019-07-31 ENCOUNTER — HOSPITAL ENCOUNTER (OUTPATIENT)
Dept: PHYSICAL THERAPY | Age: 74
Setting detail: THERAPIES SERIES
Discharge: HOME | End: 2019-07-31
Payer: MEDICARE

## 2019-07-31 PROCEDURE — 97110 THERAPEUTIC EXERCISES: CPT | Performed by: PHYSICAL THERAPIST

## 2019-07-31 PROCEDURE — 97530 THERAPEUTIC ACTIVITIES: CPT | Performed by: PHYSICAL THERAPIST

## 2019-07-31 NOTE — FLOWSHEET NOTE
(13323) Provided verbal/tactile cueing for activities related to strengthening, flexibility, endurance, ROM for improvements in LE, proximal hip, and core control with self care, mobility, lifting, ambulation.  [] (59706) Provided verbal/tactile cueing for activities related to improving balance, coordination, kinesthetic sense, posture, motor skill, proprioception  to assist with LE, proximal hip, and core control in self care, mobility, lifting, ambulation and eccentric single leg control.      NMR and Therapeutic Activities:    [x] (23712 or 12022) Provided verbal/tactile cueing for activities related to improving balance, coordination, kinesthetic sense, posture, motor skill, proprioception and motor activation to allow for proper function of core, proximal hip and LE with self care and ADLs  [x] (25448) Gait Re-education- Provided training and instruction to the patient for proper LE, core and proximal hip recruitment and positioning and eccentric body weight control with ambulation re-education including up and down stairs     Home Exercise Program:    [x] (61348) Reviewed/Progressed HEP activities related to strengthening, flexibility, endurance, ROM of core, proximal hip and LE for functional self-care, mobility, lifting and ambulation/stair navigation   [] (55459)Reviewed/Progressed HEP activities related to improving balance, coordination, kinesthetic sense, posture, motor skill, proprioception of core, proximal hip and LE for self care, mobility, lifting, and ambulation/stair navigation      Manual Treatments:  PROM / STM / Oscillations-Mobs:  G-I, II, III, IV (PA's, Inf., Post.)  [] (34849) Provided manual therapy to mobilize LE, proximal hip and/or LS spine soft tissue/joints for the purpose of modulating pain, promoting relaxation,  increasing ROM, reducing/eliminating soft tissue swelling/inflammation/restriction, improving soft tissue extensibility and allowing for proper ROM for normal function with self care, mobility, lifting and ambulation. Modalities:  [] (36278) Vasopneumatic compression: Utilized vasopneumatic compression to decrease edema / swelling for the purpose of improving mobility and quad tone / recruitment which will allow for increased overall function including but not limited to self-care, transfers, ambulation, and ascending / descending stairs. Modalities:      Charges:  Timed Code Treatment Minutes: 40   Total Treatment Minutes: 43     [] EVAL - LOW (04739)   [] EVAL - MOD (66659)  [] EVAL - HIGH (28213)  [] RE-EVAL (14399)  [x] BY(86169) x 2     [] Ionto  [] NMR (37269) x      [] Vaso  [] Manual (76560) x      [] Ultrasound  [x] TA x 1      [] Mech Traction (23857)  [] Aquatic Therapy x     [] ES (un) (74757):   [] Home Management Training x [] ES(attended) (14764)   [] Group:     [] Other:     GOALS:  Patient stated goal: return to yardwork and working at [employer] goals for Patient:   Short Term Goals: To be achieved in: 2 weeks  1. Independent in HEP and progression per patient tolerance, in order to prevent re-injury. 2. Patient will have a decrease in pain to facilitate improvement in movement, function, and ADLs as indicated by Functional Deficits. Long Term Goals: To be achieved in: 6-8 weeks  1. Disability index score of 30% or less for the LEFS to assist with reaching prior level of function. 2. Patient will demonstrate increased AROM to [facility] without pain to allow for proper joint functioning as indicated by patients Functional Deficits. 3. Patient will demonstrate an increase in Strength to at least 4+/5 throughout as well as good proximal hip strength and control to allow for proper functional mobility as indicated by patients Functional Deficits. 4. Patient will return to functional activities including walking without increased symptoms or restriction. 5. Patient will return to yardwork without increased symptoms or restriction.      Progression

## 2019-08-02 ENCOUNTER — HOSPITAL ENCOUNTER (OUTPATIENT)
Dept: PHYSICAL THERAPY | Age: 74
Setting detail: THERAPIES SERIES
Discharge: HOME | End: 2019-08-02
Payer: MEDICARE

## 2019-08-02 PROCEDURE — 97530 THERAPEUTIC ACTIVITIES: CPT

## 2019-08-02 PROCEDURE — 97110 THERAPEUTIC EXERCISES: CPT

## 2019-08-02 NOTE — FLOWSHEET NOTE
[address] [facility]    Physical Therapy Daily Treatment Note  Date:  2019    Patient Name:  Bess Rendon    :  1945  MRN: [id number]  Medical/Treatment Diagnosis Information:  · Diagnosis: Z96.641 (ICD-10-CM) - Presence of right artificial hip joint  · Treatment Diagnosis: R26.2 difficulty walking; R53.1 weakness  Insurance/Certification information:  PT Insurance Information: Medicare  Physician Information:  Referring Practitioner: Dr. Jason Echavarria of care signed (Y/N):     Date of Patient follow up with Physician:     Progress Note: []  Yes  [x]  No  Next due by: Visit #10       Latex Allergy:  [x]NO      []YES  Preferred Language for Healthcare:   [x]English       []other:    Visit # Insurance Allowable Date Range   3 Medicare Guidelines n/a     Pain level:  4/10     SUBJECTIVE: Continues to have much less pain and feels she is getting stronger. Hip is a little sore today from going shopping yesterday and being on feet a little longer than she has been.      OBJECTIVE: See eval      RESTRICTIONS/PRECAUTIONS: s/p R INDIRA 19    Exercises/Interventions:     Therapeutic Exercise (04241) x24' Resistance / level Sets/sec Reps Notes / Cues   Seated in chair HS stretch  30\" 3           SLR - flexion  2 10 Start    bridging W/ BS 5\" 10    Standing hip abduction  3 10    LAQ 3# 3 10 ^   Standing HS curls 3# 3 10 ^   Calf raises  3 10 Start    sidestepping npv                    Therapeutic Activities (14050) x8'       Step up 6\" 3 10 Start , cueing for control   Sit to stand chair 3 10 ^  Cueing to keep weight equal                 Neuromuscular Re-ed (64501) x3'       Tandem stance balance  30\" 2 Start  occasional UE support required   biodex  npv             Manual Intervention (25747) x5'       Knee mobs/PROM       Tib/Fem Mobs       Patella Mobs       Ankle mobs       Scar tissue massage  5'                Pt. Education:  -reviewed HEP    Therapeutic Exercise and NMR EXR  [x] (72835) Provided verbal/tactile cueing for activities related to strengthening, flexibility, endurance, ROM for improvements in LE, proximal hip, and core control with self care, mobility, lifting, ambulation.  [] (97800) Provided verbal/tactile cueing for activities related to improving balance, coordination, kinesthetic sense, posture, motor skill, proprioception  to assist with LE, proximal hip, and core control in self care, mobility, lifting, ambulation and eccentric single leg control.      NMR and Therapeutic Activities:    [x] (94524 or 22448) Provided verbal/tactile cueing for activities related to improving balance, coordination, kinesthetic sense, posture, motor skill, proprioception and motor activation to allow for proper function of core, proximal hip and LE with self care and ADLs  [x] (81454) Gait Re-education- Provided training and instruction to the patient for proper LE, core and proximal hip recruitment and positioning and eccentric body weight control with ambulation re-education including up and down stairs     Home Exercise Program:    [x] (11948) Reviewed/Progressed HEP activities related to strengthening, flexibility, endurance, ROM of core, proximal hip and LE for functional self-care, mobility, lifting and ambulation/stair navigation   [] (68612)Reviewed/Progressed HEP activities related to improving balance, coordination, kinesthetic sense, posture, motor skill, proprioception of core, proximal hip and LE for self care, mobility, lifting, and ambulation/stair navigation      Manual Treatments:  PROM / STM / Oscillations-Mobs:  G-I, II, III, IV (PA's, Inf., Post.)  [] (13822) Provided manual therapy to mobilize LE, proximal hip and/or LS spine soft tissue/joints for the purpose of modulating pain, promoting relaxation,  increasing ROM, reducing/eliminating soft tissue swelling/inflammation/restriction, improving soft tissue yardwork without increased symptoms or restriction. Progression Towards Functional goals:  [] Patient is progressing as expected towards functional goals listed. [] Progression is slowed due to complexities listed. [] Progression has been slowed due to co-morbidities. [x] Plan just implemented, too soon to assess goals progression  [] Other:     Persisting Functional Limitations/Impairments:  [x]Sitting [x]Standing   [x]Walking [x]Stairs   [x]Transfers [x]ADLs   [x]Squatting/bending [x]Kneeling  [x]Housework []Job related tasks  [x]Driving [x]Sports/Recreation   [x]Sleeping []Other:    ASSESSMENT:  Pt demonstrates some limited body awareness and needs cueing for proper form and technique throughout session. Pt demonstrates need for increased time with exercises but demonstrates good tolerance to current program but is fatigued. Noted some limitations with balance this date and pt will continue to benefit from skilled therapy to tolerate progression toward goals and return to full PLOF with ability to tolerate being on feet for [facility] daily functional tasks without difficulty. Treatment/Activity Tolerance:  [x] Pt able to complete treatment [] Patient limited by fatique  [] Patient limited by pain  [] Patient limited by other medical complications  [] Other:     Prognosis:  [x] Good [] Fair  [] Poor    Patient Requires Follow-up: [x] Yes  [] No    Return to Play:    [x]  N/A   []  Stage 1: Intro to Strength   []  Stage 2: Return to Run and Strength   []  Stage 3: Return to Jump and Strength   []  Stage 4: Dynamic Strength and Agility   []  Stage 5: Sport Specific Training     []  Ready to Return to Play, Meets All Above Stages   []  Not Ready for Return to Sports   Comments:            PLAN: See eval. PT 1-2x / week for 4-6 weeks.  Slow progression due to previous limited tolerance for therapy   [x] Continue per plan of care [] Alter current plan (see comments)  [] Plan of care initiated [] Hold pending MD

## 2019-08-05 ENCOUNTER — HOSPITAL ENCOUNTER (OUTPATIENT)
Dept: PHYSICAL THERAPY | Age: 74
Setting detail: THERAPIES SERIES
Discharge: HOME | End: 2019-08-05
Payer: MEDICARE

## 2019-08-05 PROCEDURE — 97110 THERAPEUTIC EXERCISES: CPT

## 2019-08-05 NOTE — FLOWSHEET NOTE
swelling/inflammation/restriction, improving soft tissue extensibility and allowing for proper ROM for normal function with self care, mobility, lifting and ambulation. Modalities:  [] (31877) Vasopneumatic compression: Utilized vasopneumatic compression to decrease edema / swelling for the purpose of improving mobility and quad tone / recruitment which will allow for increased overall function including but not limited to self-care, transfers, ambulation, and ascending / descending stairs. Modalities:      Charges:  Timed Code Treatment Minutes: 35   Total Treatment Minutes: 45     [] EVAL - LOW (34288)   [] EVAL - MOD (01546)  [] EVAL - HIGH (45196)  [] RE-EVAL (36900)  [x] FY(87527) x 2     [] Ionto  [] NMR (67223) x      [] Vaso  [] Manual (11380) x      [] Ultrasound  [] TA x       [] Mech Traction (96196)  [] Aquatic Therapy x     [] ES (un) (93173):   [] Home Management Training x [] ES(attended) (47235)   [] Group:     [] Other:     GOALS:  Patient stated goal: return to yardwork and working at [employer] goals for Patient:   Short Term Goals: To be achieved in: 2 weeks  1. Independent in HEP and progression per patient tolerance, in order to prevent re-injury. 2. Patient will have a decrease in pain to facilitate improvement in movement, function, and ADLs as indicated by Functional Deficits. Long Term Goals: To be achieved in: 6-8 weeks  1. Disability index score of 30% or less for the LEFS to assist with reaching prior level of function. 2. Patient will demonstrate increased AROM to [facility] without pain to allow for proper joint functioning as indicated by patients Functional Deficits. 3. Patient will demonstrate an increase in Strength to at least 4+/5 throughout as well as good proximal hip strength and control to allow for proper functional mobility as indicated by patients Functional Deficits.    4. Patient will return to functional activities including walking without

## 2019-08-08 ENCOUNTER — NURSE ONLY (OUTPATIENT)
Dept: FAMILY MEDICINE CLINIC | Age: 74
End: 2019-08-08
Payer: MEDICARE

## 2019-08-08 ENCOUNTER — HOSPITAL ENCOUNTER (OUTPATIENT)
Dept: PHYSICAL THERAPY | Age: 74
Setting detail: THERAPIES SERIES
Discharge: HOME | End: 2019-08-08
Payer: MEDICARE

## 2019-08-08 DIAGNOSIS — E87.1 LOW SODIUM LEVELS: Primary | ICD-10-CM

## 2019-08-08 LAB
ANION GAP SERPL CALCULATED.3IONS-SCNC: 11 MMOL/L (ref 3–16)
BUN BLDV-MCNC: 9 MG/DL (ref 7–20)
CALCIUM SERPL-MCNC: 9.6 MG/DL (ref 8.3–10.6)
CHLORIDE BLD-SCNC: 98 MMOL/L (ref 99–110)
CO2: 25 MMOL/L (ref 21–32)
CREAT SERPL-MCNC: 0.6 MG/DL (ref 0.6–1.2)
GFR AFRICAN AMERICAN: >60
GFR NON-AFRICAN AMERICAN: >60
GLUCOSE BLD-MCNC: 100 MG/DL (ref 70–99)
POTASSIUM SERPL-SCNC: 4.7 MMOL/L (ref 3.5–5.1)
SODIUM BLD-SCNC: 134 MMOL/L (ref 136–145)

## 2019-08-08 PROCEDURE — 97150 GROUP THERAPEUTIC PROCEDURES: CPT | Performed by: PHYSICAL THERAPIST

## 2019-08-08 PROCEDURE — 97530 THERAPEUTIC ACTIVITIES: CPT | Performed by: PHYSICAL THERAPIST

## 2019-08-08 PROCEDURE — 97110 THERAPEUTIC EXERCISES: CPT | Performed by: PHYSICAL THERAPIST

## 2019-08-08 PROCEDURE — 36415 COLL VENOUS BLD VENIPUNCTURE: CPT | Performed by: FAMILY MEDICINE

## 2019-08-12 ENCOUNTER — OFFICE VISIT (OUTPATIENT)
Dept: ENT CLINIC | Age: 74
End: 2019-08-12
Payer: MEDICARE

## 2019-08-12 ENCOUNTER — HOSPITAL ENCOUNTER (OUTPATIENT)
Dept: PHYSICAL THERAPY | Age: 74
Setting detail: THERAPIES SERIES
Discharge: HOME | End: 2019-08-12
Payer: MEDICARE

## 2019-08-12 VITALS — SYSTOLIC BLOOD PRESSURE: 110 MMHG | HEART RATE: 85 BPM | DIASTOLIC BLOOD PRESSURE: 60 MMHG | OXYGEN SATURATION: 96 %

## 2019-08-12 DIAGNOSIS — H61.23 BILATERAL IMPACTED CERUMEN: Primary | ICD-10-CM

## 2019-08-12 PROCEDURE — 97110 THERAPEUTIC EXERCISES: CPT

## 2019-08-12 PROCEDURE — 97530 THERAPEUTIC ACTIVITIES: CPT

## 2019-08-12 PROCEDURE — 69210 REMOVE IMPACTED EAR WAX UNI: CPT | Performed by: OTOLARYNGOLOGY

## 2019-08-12 NOTE — FLOWSHEET NOTE
tissue/joints for the purpose of modulating pain, promoting relaxation,  increasing ROM, reducing/eliminating soft tissue swelling/inflammation/restriction, improving soft tissue extensibility and allowing for proper ROM for normal function with self care, mobility, lifting and ambulation. Modalities:  [] (88603) Vasopneumatic compression: Utilized vasopneumatic compression to decrease edema / swelling for the purpose of improving mobility and quad tone / recruitment which will allow for increased overall function including but not limited to self-care, transfers, ambulation, and ascending / descending stairs. Modalities:      Charges:  Timed Code Treatment Minutes: 40   Total Treatment Minutes: 40     [] EVAL - LOW (31951)   [] EVAL - MOD (32544)  [] EVAL - HIGH (47687)  [] RE-EVAL (72076)  [x] VZ(37559) x 2     [] Ionto  [] NMR (56823) x      [] Vaso  [] Manual (24375) x      [] Ultrasound  [x] TA x  1     [] Mech Traction (47036)  [] Aquatic Therapy x     [] ES (un) (22587):   [] Home Management Training x [] ES(attended) (27545)   [] Group:     [] Other:     GOALS:  Patient stated goal: return to yardwork and working at [employer] goals for Patient:   Short Term Goals: To be achieved in: 2 weeks  1. Independent in HEP and progression per patient tolerance, in order to prevent re-injury. 2. Patient will have a decrease in pain to facilitate improvement in movement, function, and ADLs as indicated by Functional Deficits. Long Term Goals: To be achieved in: 6-8 weeks  1. Disability index score of 30% or less for the LEFS to assist with reaching prior level of function. 2. Patient will demonstrate increased AROM to [facility] without pain to allow for proper joint functioning as indicated by patients Functional Deficits.    3. Patient will demonstrate an increase in Strength to at least 4+/5 throughout as well as good proximal hip strength and control to allow for proper functional mobility as indicated by patients Functional Deficits. 4. Patient will return to functional activities including walking without increased symptoms or restriction. 5. Patient will return to yardwork without increased symptoms or restriction. Progression Towards Functional goals:  [] Patient is progressing as expected towards functional goals listed. [] Progression is slowed due to complexities listed. [] Progression has been slowed due to co-morbidities. [x] Plan just implemented, too soon to assess goals progression  [] Other:     Persisting Functional Limitations/Impairments:  [x]Sitting [x]Standing   [x]Walking [x]Stairs   [x]Transfers [x]ADLs   [x]Squatting/bending [x]Kneeling  [x]Housework []Job related tasks  [x]Driving [x]Sports/Recreation   [x]Sleeping []Other:    ASSESSMENT:  Upgraded weight for standing hip abduction and added leg press. Pt is challenged by upgrades as well as noted deficits with proprioception for balance tasks. Pt continues to need skilled therapy to improve strength and neuromuscular function for return to independent mobility with improved safety and decreased fall risk. Pt continues to demonstrate deficits in body awareness and needs cueing throughout session for proper form. Treatment/Activity Tolerance:  [x] Pt able to complete treatment [] Patient limited by fatique  [] Patient limited by pain  [] Patient limited by other medical complications  [] Other:     Prognosis:  [x] Good [] Fair  [] Poor    Patient Requires Follow-up: [x] Yes  [] No    Return to Play:    [x]  N/A   []  Stage 1: Intro to Strength   []  Stage 2: Return to Run and Strength   []  Stage 3: Return to Jump and Strength   []  Stage 4: Dynamic Strength and Agility   []  Stage 5: Sport Specific Training   []  Ready to Return to Play, Meets All Above Stages   []  Not Ready for Return to Sports   Comments:            PLAN: See eval. PT 1-2x / week for 4-6 weeks.    [x] Continue per plan of care [] Alter current

## 2019-08-15 ENCOUNTER — HOSPITAL ENCOUNTER (OUTPATIENT)
Dept: PHYSICAL THERAPY | Age: 74
Setting detail: THERAPIES SERIES
Discharge: HOME | End: 2019-08-15
Payer: MEDICARE

## 2019-08-15 PROCEDURE — 97530 THERAPEUTIC ACTIVITIES: CPT | Performed by: PHYSICAL THERAPIST

## 2019-08-15 PROCEDURE — 97110 THERAPEUTIC EXERCISES: CPT | Performed by: PHYSICAL THERAPIST

## 2019-08-15 PROCEDURE — 97150 GROUP THERAPEUTIC PROCEDURES: CPT | Performed by: PHYSICAL THERAPIST

## 2019-08-15 NOTE — FLOWSHEET NOTE
Post.)  [] (21491) Provided manual therapy to mobilize LE, proximal hip and/or LS spine soft tissue/joints for the purpose of modulating pain, promoting relaxation,  increasing ROM, reducing/eliminating soft tissue swelling/inflammation/restriction, improving soft tissue extensibility and allowing for proper ROM for normal function with self care, mobility, lifting and ambulation. Modalities:  [] (39902) Vasopneumatic compression: Utilized vasopneumatic compression to decrease edema / swelling for the purpose of improving mobility and quad tone / recruitment which will allow for increased overall function including but not limited to self-care, transfers, ambulation, and ascending / descending stairs. Modalities:      Charges:  Timed Code Treatment Minutes: 28   Total Treatment Minutes: 42     [] EVAL - LOW (75290)   [] EVAL - MOD (09501)  [] EVAL - HIGH (16062)  [] RE-EVAL (60121)  [x] AF(32855) x 1     [] Ionto  [] NMR (89556) x      [] Vaso  [] Manual (93257) x      [] Ultrasound  [x] TA x  1     [] Mech Traction (89187)  [] Aquatic Therapy x     [] ES (un) (27173):   [] Home Management Training x [] ES(attended) (48436)   [x] Group: 1    [] Other:     GOALS:  Patient stated goal: return to yardwork and working at [employer] goals for Patient:   Short Term Goals: To be achieved in: 2 weeks  1. Independent in HEP and progression per patient tolerance, in order to prevent re-injury. MET  2. Patient will have a decrease in pain to facilitate improvement in movement, function, and ADLs as indicated by Functional Deficits. MET    Long Term Goals: To be achieved in: 6-8 weeks  1. Disability index score of 30% or less for the LEFS to assist with reaching prior level of function. 2. Patient will demonstrate increased AROM to [facility] without pain to allow for proper joint functioning as indicated by patients Functional Deficits.    3. Patient will demonstrate an increase in Strength to at least 4+/5

## 2019-08-20 ENCOUNTER — HOSPITAL ENCOUNTER (OUTPATIENT)
Dept: PHYSICAL THERAPY | Age: 74
Setting detail: THERAPIES SERIES
Discharge: HOME | End: 2019-08-20
Payer: MEDICARE

## 2019-08-20 PROCEDURE — 97530 THERAPEUTIC ACTIVITIES: CPT | Performed by: PHYSICAL THERAPIST

## 2019-08-20 PROCEDURE — 97110 THERAPEUTIC EXERCISES: CPT | Performed by: PHYSICAL THERAPIST

## 2019-08-20 NOTE — FLOWSHEET NOTE
[facility] [facility]    Physical Therapy Daily Treatment Note  Date:  2019    Patient Name:  Jarvis Mendoza    :  1945  MRN: [id number]  Medical/Treatment Diagnosis Information:  · Diagnosis: Z96.641 (ICD-10-CM) - Presence of right artificial hip joint  · Treatment Diagnosis: R26.2 difficulty walking; R53.1 weakness  Insurance/Certification information:  PT Insurance Information: Medicare  Physician Information:  Referring Practitioner: Dr. Donnel[facility] of care signed (Y/N):     Date of Patient follow up with Physician:     Progress Note: []  Yes  [x]  No  Next due by: Visit #10       Latex Allergy:  [x]NO      []YES  Preferred Language for Healthcare:   [x]English       []other:    Visit # Insurance Allowable Date Range   8 Medicare Guidelines n/a     Pain level:  0/10      SUBJECTIVE:  Pt. Denies pain in her hip at this time. Pt. Reports that she woke up this morning with a little anterior and lateral hip pain that has since subsided. Pt. Saw referring MD who is pleased with her progress.      OBJECTIVE:     Gait: FWB without AD, slight decrease in R hip stance due to limitations in hip extension      RESTRICTIONS/PRECAUTIONS: s/p R INDIRA 19    Exercises/Interventions:     Therapeutic Exercise (99775) x24' Resistance / level Sets/sec Reps Notes / Cues   Seated in chair HS stretch  30\" 3           SLR - flexion 3# 2 10 ^8/15 cueing to maintain max knee extension when fatigued   bridging W/ BS 5\" 15    Standing hip abduction 3# 3 10    LAQ 7.5# 3 10 ^   Standing HS curls 7.5# 3 10 ^   Calf raises  3 10 Start    Lateral stepping orange 3 10 Start    Standing hip extension orange 3 10 Start , cueing to decrease trunk compensations   Leg press DL 85# 3 10 ^8/15   bike L2 5'  Start 8/15                 Therapeutic Activities (70391)  x10'       Start , cueing for control   Sit to stand chair 3 10 ^  Cueing to keep weight and LE for self care, mobility, lifting, and ambulation/stair navigation      Manual Treatments:  PROM / STM / Oscillations-Mobs:  G-I, II, III, IV (PA's, Inf., Post.)  [] (17766) Provided manual therapy to mobilize LE, proximal hip and/or LS spine soft tissue/joints for the purpose of modulating pain, promoting relaxation,  increasing ROM, reducing/eliminating soft tissue swelling/inflammation/restriction, improving soft tissue extensibility and allowing for proper ROM for normal function with self care, mobility, lifting and ambulation. Modalities:  [] (23593) Vasopneumatic compression: Utilized vasopneumatic compression to decrease edema / swelling for the purpose of improving mobility and quad tone / recruitment which will allow for increased overall function including but not limited to self-care, transfers, ambulation, and ascending / descending stairs. Modalities:       Charges:  Timed Code Treatment Minutes: 41   Total Treatment Minutes: 52     [] EVAL - LOW (07408)   [] EVAL - MOD (71165)  [] EVAL - HIGH (16440)  [] RE-EVAL (06049)  [x] RC(60292) x 2     [] Ionto  [] NMR (65037) x      [] Vaso  [] Manual (24956) x      [] Ultrasound  [x] TA x  1     [] Mech Traction (32061)  [] Aquatic Therapy x     [] ES (un) (45189):   [] Home Management Training x [] ES(attended) (86754)   [] Group:     [] Other:     GOALS:  Patient stated goal: return to yardwork and working at [employer] goals for Patient:   Short Term Goals: To be achieved in: 2 weeks  1. Independent in HEP and progression per patient tolerance, in order to prevent re-injury. MET  2. Patient will have a decrease in pain to facilitate improvement in movement, function, and ADLs as indicated by Functional Deficits. MET    Long Term Goals: To be achieved in: 6-8 weeks  1. Disability index score of 30% or less for the LEFS to assist with reaching prior level of function.    2. Patient will demonstrate increased AROM to [facility] without pain

## 2019-08-22 ENCOUNTER — HOSPITAL ENCOUNTER (OUTPATIENT)
Dept: PHYSICAL THERAPY | Age: 74
Setting detail: THERAPIES SERIES
Discharge: HOME | End: 2019-08-22
Payer: MEDICARE

## 2019-08-22 PROCEDURE — 97530 THERAPEUTIC ACTIVITIES: CPT | Performed by: PHYSICAL THERAPIST

## 2019-08-22 PROCEDURE — 97110 THERAPEUTIC EXERCISES: CPT | Performed by: PHYSICAL THERAPIST

## 2019-08-22 NOTE — FLOWSHEET NOTE
care, mobility, lifting, and ambulation/stair navigation      Manual Treatments:  PROM / STM / Oscillations-Mobs:  G-I, II, III, IV (PA's, Inf., Post.)  [] (73748) Provided manual therapy to mobilize LE, proximal hip and/or LS spine soft tissue/joints for the purpose of modulating pain, promoting relaxation,  increasing ROM, reducing/eliminating soft tissue swelling/inflammation/restriction, improving soft tissue extensibility and allowing for proper ROM for normal function with self care, mobility, lifting and ambulation. Modalities:  [] (90846) Vasopneumatic compression: Utilized vasopneumatic compression to decrease edema / swelling for the purpose of improving mobility and quad tone / recruitment which will allow for increased overall function including but not limited to self-care, transfers, ambulation, and ascending / descending stairs. Modalities:       Charges:  Timed Code Treatment Minutes: 43   Total Treatment Minutes: 48     [] EVAL - LOW (67153)   [] EVAL - MOD (88140)  [] EVAL - HIGH (22918)  [] RE-EVAL (88070)  [x] DY(93881) x 2     [] Ionto  [] NMR (24336) x      [] Vaso  [] Manual (78098) x      [] Ultrasound  [x] TA x  1     [] Mech Traction (33283)  [] Aquatic Therapy x     [] ES (un) (53822):   [] Home Management Training x [] ES(attended) (52444)   [] Group:     [] Other:     GOALS:  Patient stated goal: return to yardwork and working at [employer] goals for Patient:   Short Term Goals: To be achieved in: 2 weeks  1. Independent in HEP and progression per patient tolerance, in order to prevent re-injury. MET  2. Patient will have a decrease in pain to facilitate improvement in movement, function, and ADLs as indicated by Functional Deficits. MET    Long Term Goals: To be achieved in: 6-8 weeks  1. Disability index score of 30% or less for the LEFS to assist with reaching prior level of function.    2. Patient will demonstrate increased AROM to [facility] without pain to allow for proper joint functioning as indicated by patients Functional Deficits. 3. Patient will demonstrate an increase in Strength to at least 4+/5 throughout as well as good proximal hip strength and control to allow for proper functional mobility as indicated by patients Functional Deficits. 4. Patient will return to functional activities including walking without increased symptoms or restriction. 5. Patient will return to yardwork without increased symptoms or restriction. Progression Towards Functional goals:  [x] Patient is progressing as expected towards functional goals listed. [] Progression is slowed due to complexities listed. [] Progression has been slowed due to co-morbidities. [] Plan just implemented, too soon to assess goals progression  [] Other:     Persisting Functional Limitations/Impairments:  []Sitting []Standing   []Walking [x]Stairs   []Transfers []ADLs   [x]Squatting/bending [x]Kneeling  [x]Housework []Job related tasks  []Driving [x]Sports/Recreation   []Sleeping []Other:    ASSESSMENT:      Treatment/Activity Tolerance:  [x] Pt able to complete treatment [] Patient limited by fatique  [] Patient limited by pain  [] Patient limited by other medical complications  [x] Other: Pt. Tolerated therapy today without complaints. Pt. Continues to be challenged by current resistance program. Pt. Requires occasional UE support throughout all balance activities. Pt. Has progressed in functional movements and strength and demonstrates good control with stairs, especially improved with descent. Plan for transition to independent home program NPV if pt. Continues to progress as expected.      Prognosis:  [x] Good [] Fair  [] Poor    Patient Requires Follow-up: [x] Yes  [] No    Return to Play:    [x]  N/A   []  Stage 1: Intro to Strength   []  Stage 2: Return to Run and Strength   []  Stage 3: Return to Jump and Strength   []  Stage 4: Dynamic Strength and Agility   []  Stage 5: Sport Specific Training   []  Ready to Return to Play, Meets All Above Stages   []  Not Ready for Return to Sports   Comments:            PLAN: See eval. PT 1-2x / week for 4-6 weeks; possible d/c npv   [x] Continue per plan of care [] Alter current plan (see comments)  [] Plan of care initiated [] Hold pending MD visit [] Discharge    Electronically signed by: Ashley Rogers PT

## 2019-08-27 ENCOUNTER — HOSPITAL ENCOUNTER (OUTPATIENT)
Dept: PHYSICAL THERAPY | Age: 74
Setting detail: THERAPIES SERIES
Discharge: HOME | End: 2019-08-27
Payer: MEDICARE

## 2019-08-27 PROCEDURE — 97110 THERAPEUTIC EXERCISES: CPT | Performed by: PHYSICAL THERAPIST

## 2019-08-27 NOTE — PLAN OF CARE
[address] [facility]     Physical Therapy Discharge Summary    Dear  Dr. Shahbaz Tariq,    We had the pleasure of treating the following patient for physical therapy services at [address] [place] [address]. A summary of our findings can be found in the discharge summary below. If you have any questions or concerns regarding these findings, please do not hesitate to contact me at the office phone number checked above. Thank you for the referral.     Physician Signature:________________________________Date:__________________  By signing above (or electronic signature), therapists plan is approved by physician      Functional Outcome: LEFS 19% limitation    Overall Response to Treatment:   [x]Patient is responding well to treatment and improvement is noted with regards  to goals   [x]Patient should continue to improve in reasonable time if they continue HEP   []Patient has plateaued and is no longer responding to skilled PT intervention    []Patient is getting worse and would benefit from return to referring MD   []Patient unable to adhere to initial POC   [x]Other: Pt. Demonstrates improvements in gait, mobility and strength. Pt. Is ready for d/c to independent home program.     Date range of Visits: 19-19  Total Visits: 10    Recommendation:    [x] Discharge to [facility]. Follow up with PT PRN.        Physical Therapy Daily Treatment Note  Date:  2019    Patient Name:  Carmelo Hastings    :  1945  MRN: [id number]  Medical/Treatment Diagnosis Information:  · Diagnosis: Z96.641 (ICD-10-CM) - Presence of right artificial hip joint  · Treatment Diagnosis: R26.2 difficulty walking; R53.1 weakness  Insurance/Certification information:  PT Insurance Information: Medicare  Physician Information:  Referring Practitioner: Dr. Tao Reed of care signed (Y/N):     Date of Patient follow up with Physician:     Progress Note: [x]  Yes  []  No Latex Allergy:  [x]NO      []YES  Preferred Language for Healthcare:   [x]English       []other:    Visit # Insurance Allowable Date Range   10 Medicare Guidelines n/a     Pain level:  0/10      SUBJECTIVE:  Pt. Denies pain in her hip at this time. Pt. States that she has been able to increase walking with only a little fatigue and soreness.  Pt. States that she is ready for d/c to home program.     OBJECTIVE: 8/27    Gait: FWB without AD, slight decrease in R hip stance due to limitations in hip extension      PROM     L R   Hip Flexion Slight restriction, sore   Hip Abduction [facility] [place]   Hip ER [facility] [place]   Hip IR restricted   Knee Flexion WFL   [facility] [place]         Strength (0-5)  Left Right   Hip Flexion - seated  4+ 4    Hip Abduction - sidelying  4 (difficulty laying on R side)  4   Quads  4+ 4+    Hamstrings  4+  4                   RESTRICTIONS/PRECAUTIONS: s/p R INDIRA 5/21/19    Exercises/Interventions:     Therapeutic Exercise (81876) x30' Resistance / level Sets/sec Reps Notes / Cues   Seated in chair HS stretch  30\" 3           SLR - flexion 3# 2 10 ^8/15 cueing to maintain max knee extension when fatigued   bridging W/ BS 5\" 15    Standing hip abduction 3# 3 10    LAQ 7.5# 3 10 ^8/20   Standing HS curls 7.5# 3 10 ^8/20   Calf raises  3 10 Start 7/31   Lateral stepping orange 3 10 Start 8/8   Standing hip extension orange 3 10 Start 8/20, cueing to decrease trunk compensations   Leg press DL 85# 3 10 ^8/15   bike L2 5'  Start 8/15                 Therapeutic Activities (54348)  x4'       Start 7/31, cueing for control   Sit to stand chair 3 10 ^7/31  Cueing to keep weight equal    ^8/15          Neuromuscular Re-ed (81639) x7'       Tandem stance balance Eyes closed 30\" 2 Start 7/31 occasional UE support required   biodex balance PS L8 2'  ^8/20    Single leg balance  10\" 5 Added 8/5 cueing to maintain level pelvis                        Manual Intervention (73967)        Knee mobs/PROM       Tib/Fem Mobs activities including walking without increased symptoms or restriction. MET  5. Patient will return to yardwork without increased symptoms or restriction. PROGRESSING    Progression Towards Functional goals:  [x] Patient is progressing as expected towards functional goals listed. [] Progression is slowed due to complexities listed. [] Progression has been slowed due to co-morbidities. [] Plan just implemented, too soon to assess goals progression  [] Other:     Persisting Functional Limitations/Impairments:  []Sitting []Standing   []Walking []Stairs   []Transfers []ADLs   [x]Squatting/bending []Kneeling  []Housework []Job related tasks  []Driving [x]Sports/Recreation   []Sleeping []Other:    ASSESSMENT:      Treatment/Activity Tolerance:  [x] Pt able to complete treatment [] Patient limited by fatique  [] Patient limited by pain  [] Patient limited by other medical complications  [x] Other: Pt. Tolerated therapy today without complaints. Pt. Demonstrates improvements in strength and mobility. Pt. Demonstrates safety with ambulation and stairs. Patient is currently independent with symptom management and home exercise program. Patient reports understanding of the importance of continued compliance with home exercise program after discharge. Patient should reach all long term goals with compliance to home exercise program upon discharge. Prognosis:  [x] Good [] Fair  [] Poor    Patient Requires Follow-up: [] Yes  [x] No    Return to Play:    [x]  N/A   []  Stage 1: Intro to Strength   []  Stage 2: Return to Run and Strength   []  Stage 3: Return to Jump and Strength   []  Stage 4: Dynamic Strength and Agility   []  Stage 5: Sport Specific Training   []  Ready to Return to Play, Meets All Above Stages   []  Not Ready for Return to Sports   Comments:            PLAN: D/C to HEP.  Pt to call with any questions and f/u with PT prn.  [] Continue per plan of care [] Alter current plan (see comments)  [] Plan of

## 2019-09-24 ENCOUNTER — ANESTHESIA EVENT (OUTPATIENT)
Dept: ENDOSCOPY | Age: 74
End: 2019-09-24
Payer: MEDICARE

## 2019-09-27 ENCOUNTER — TELEPHONE (OUTPATIENT)
Dept: FAMILY MEDICINE CLINIC | Age: 74
End: 2019-09-27

## 2019-10-10 ENCOUNTER — ANESTHESIA (OUTPATIENT)
Dept: ENDOSCOPY | Age: 74
End: 2019-10-10
Payer: MEDICARE

## 2019-10-10 ENCOUNTER — HOSPITAL ENCOUNTER (OUTPATIENT)
Age: 74
Setting detail: OUTPATIENT SURGERY
Discharge: HOME | End: 2019-10-10
Attending: INTERNAL MEDICINE | Admitting: INTERNAL MEDICINE
Payer: MEDICARE

## 2019-10-10 VITALS
HEART RATE: 66 BPM | SYSTOLIC BLOOD PRESSURE: 132 MMHG | BODY MASS INDEX: 24.11 KG/M2 | TEMPERATURE: 97.4 F | OXYGEN SATURATION: 100 % | WEIGHT: 131 LBS | DIASTOLIC BLOOD PRESSURE: 97 MMHG | HEIGHT: 62 IN | RESPIRATION RATE: 16 BRPM

## 2019-10-10 VITALS — SYSTOLIC BLOOD PRESSURE: 110 MMHG | DIASTOLIC BLOOD PRESSURE: 58 MMHG | OXYGEN SATURATION: 98 %

## 2019-10-10 PROCEDURE — 2709999900 HC NON-CHARGEABLE SUPPLY: Performed by: INTERNAL MEDICINE

## 2019-10-10 PROCEDURE — 2580000003 HC RX 258: Performed by: ANESTHESIOLOGY

## 2019-10-10 PROCEDURE — 2500000003 HC RX 250 WO HCPCS: Performed by: NURSE ANESTHETIST, CERTIFIED REGISTERED

## 2019-10-10 PROCEDURE — 7100000011 HC PHASE II RECOVERY - ADDTL 15 MIN: Performed by: INTERNAL MEDICINE

## 2019-10-10 PROCEDURE — 2580000003 HC RX 258: Performed by: NURSE ANESTHETIST, CERTIFIED REGISTERED

## 2019-10-10 PROCEDURE — 88305 TISSUE EXAM BY PATHOLOGIST: CPT

## 2019-10-10 PROCEDURE — 6370000000 HC RX 637 (ALT 250 FOR IP): Performed by: INTERNAL MEDICINE

## 2019-10-10 PROCEDURE — 3609010600 HC COLONOSCOPY POLYPECTOMY SNARE/COLD BIOPSY: Performed by: INTERNAL MEDICINE

## 2019-10-10 PROCEDURE — 6360000002 HC RX W HCPCS: Performed by: NURSE ANESTHETIST, CERTIFIED REGISTERED

## 2019-10-10 PROCEDURE — 3700000000 HC ANESTHESIA ATTENDED CARE: Performed by: INTERNAL MEDICINE

## 2019-10-10 PROCEDURE — 7100000010 HC PHASE II RECOVERY - FIRST 15 MIN: Performed by: INTERNAL MEDICINE

## 2019-10-10 PROCEDURE — 3700000001 HC ADD 15 MINUTES (ANESTHESIA): Performed by: INTERNAL MEDICINE

## 2019-10-10 RX ORDER — SODIUM CHLORIDE 9 MG/ML
INJECTION, SOLUTION INTRAVENOUS CONTINUOUS PRN
Status: DISCONTINUED | OUTPATIENT
Start: 2019-10-10 | End: 2019-10-10 | Stop reason: SDUPTHER

## 2019-10-10 RX ORDER — SODIUM CHLORIDE 9 MG/ML
INJECTION, SOLUTION INTRAVENOUS CONTINUOUS
Status: DISCONTINUED | OUTPATIENT
Start: 2019-10-10 | End: 2019-10-10 | Stop reason: HOSPADM

## 2019-10-10 RX ORDER — PROPOFOL 10 MG/ML
INJECTION, EMULSION INTRAVENOUS PRN
Status: DISCONTINUED | OUTPATIENT
Start: 2019-10-10 | End: 2019-10-10 | Stop reason: SDUPTHER

## 2019-10-10 RX ORDER — SODIUM CHLORIDE 0.9 % (FLUSH) 0.9 %
10 SYRINGE (ML) INJECTION EVERY 12 HOURS SCHEDULED
Status: DISCONTINUED | OUTPATIENT
Start: 2019-10-10 | End: 2019-10-10 | Stop reason: HOSPADM

## 2019-10-10 RX ORDER — LIDOCAINE HYDROCHLORIDE 10 MG/ML
1 INJECTION, SOLUTION EPIDURAL; INFILTRATION; INTRACAUDAL; PERINEURAL
Status: DISCONTINUED | OUTPATIENT
Start: 2019-10-10 | End: 2019-10-10 | Stop reason: HOSPADM

## 2019-10-10 RX ORDER — LIDOCAINE HYDROCHLORIDE 20 MG/ML
INJECTION, SOLUTION INFILTRATION; PERINEURAL PRN
Status: DISCONTINUED | OUTPATIENT
Start: 2019-10-10 | End: 2019-10-10 | Stop reason: SDUPTHER

## 2019-10-10 RX ORDER — SODIUM CHLORIDE 0.9 % (FLUSH) 0.9 %
10 SYRINGE (ML) INJECTION PRN
Status: DISCONTINUED | OUTPATIENT
Start: 2019-10-10 | End: 2019-10-10 | Stop reason: HOSPADM

## 2019-10-10 RX ADMIN — PROPOFOL 50 MG: 10 INJECTION, EMULSION INTRAVENOUS at 08:25

## 2019-10-10 RX ADMIN — LIDOCAINE HYDROCHLORIDE 100 MG: 20 INJECTION, SOLUTION INFILTRATION; PERINEURAL at 08:23

## 2019-10-10 RX ADMIN — PROPOFOL 50 MG: 10 INJECTION, EMULSION INTRAVENOUS at 08:29

## 2019-10-10 RX ADMIN — PROPOFOL 50 MG: 10 INJECTION, EMULSION INTRAVENOUS at 08:23

## 2019-10-10 RX ADMIN — SODIUM CHLORIDE: 9 INJECTION, SOLUTION INTRAVENOUS at 07:41

## 2019-10-10 RX ADMIN — SODIUM CHLORIDE: 9 INJECTION, SOLUTION INTRAVENOUS at 08:20

## 2019-10-10 RX ADMIN — PROPOFOL 30 MG: 10 INJECTION, EMULSION INTRAVENOUS at 08:39

## 2019-10-10 RX ADMIN — PROPOFOL 20 MG: 10 INJECTION, EMULSION INTRAVENOUS at 08:34

## 2019-10-10 ASSESSMENT — PAIN SCALES - GENERAL
PAINLEVEL_OUTOF10: 0

## 2019-10-10 ASSESSMENT — ENCOUNTER SYMPTOMS: SHORTNESS OF BREATH: 0

## 2019-10-10 ASSESSMENT — PAIN - FUNCTIONAL ASSESSMENT: PAIN_FUNCTIONAL_ASSESSMENT: 0-10

## 2019-10-17 ENCOUNTER — IMMUNIZATION (OUTPATIENT)
Dept: FAMILY MEDICINE CLINIC | Age: 74
End: 2019-10-17
Payer: MEDICARE

## 2019-10-17 DIAGNOSIS — Z23 NEED FOR IMMUNIZATION AGAINST INFLUENZA: Primary | ICD-10-CM

## 2019-10-17 PROCEDURE — G0008 ADMIN INFLUENZA VIRUS VAC: HCPCS | Performed by: FAMILY MEDICINE

## 2019-10-17 PROCEDURE — 90653 IIV ADJUVANT VACCINE IM: CPT | Performed by: FAMILY MEDICINE

## 2019-10-31 ENCOUNTER — HOSPITAL ENCOUNTER (OUTPATIENT)
Dept: WOMENS IMAGING | Age: 74
Discharge: HOME | End: 2019-10-31
Payer: MEDICARE

## 2019-10-31 DIAGNOSIS — Z12.31 ENCOUNTER FOR SCREENING MAMMOGRAM FOR BREAST CANCER: ICD-10-CM

## 2019-10-31 PROCEDURE — 77063 BREAST TOMOSYNTHESIS BI: CPT

## 2019-12-02 ENCOUNTER — OFFICE VISIT (OUTPATIENT)
Dept: FAMILY MEDICINE CLINIC | Age: 74
End: 2019-12-02
Payer: MEDICARE

## 2019-12-02 VITALS
HEIGHT: 63 IN | DIASTOLIC BLOOD PRESSURE: 76 MMHG | RESPIRATION RATE: 16 BRPM | WEIGHT: 128 LBS | TEMPERATURE: 97.5 F | HEART RATE: 86 BPM | BODY MASS INDEX: 22.68 KG/M2 | OXYGEN SATURATION: 98 % | SYSTOLIC BLOOD PRESSURE: 122 MMHG

## 2019-12-02 DIAGNOSIS — R09.89 CHEST CONGESTION: Primary | ICD-10-CM

## 2019-12-02 DIAGNOSIS — M81.0 AGE-RELATED OSTEOPOROSIS WITHOUT CURRENT PATHOLOGICAL FRACTURE: ICD-10-CM

## 2019-12-02 PROCEDURE — G8420 CALC BMI NORM PARAMETERS: HCPCS | Performed by: NURSE PRACTITIONER

## 2019-12-02 PROCEDURE — 4040F PNEUMOC VAC/ADMIN/RCVD: CPT | Performed by: NURSE PRACTITIONER

## 2019-12-02 PROCEDURE — G8399 PT W/DXA RESULTS DOCUMENT: HCPCS | Performed by: NURSE PRACTITIONER

## 2019-12-02 PROCEDURE — 99212 OFFICE O/P EST SF 10 MIN: CPT | Performed by: NURSE PRACTITIONER

## 2019-12-02 PROCEDURE — 3288F FALL RISK ASSESSMENT DOCD: CPT | Performed by: NURSE PRACTITIONER

## 2019-12-02 PROCEDURE — G8482 FLU IMMUNIZE ORDER/ADMIN: HCPCS | Performed by: NURSE PRACTITIONER

## 2019-12-02 PROCEDURE — 1090F PRES/ABSN URINE INCON ASSESS: CPT | Performed by: NURSE PRACTITIONER

## 2019-12-02 PROCEDURE — G8599 NO ASA/ANTIPLAT THER USE RNG: HCPCS | Performed by: NURSE PRACTITIONER

## 2019-12-02 PROCEDURE — 1123F ACP DISCUSS/DSCN MKR DOCD: CPT | Performed by: NURSE PRACTITIONER

## 2019-12-02 PROCEDURE — 1036F TOBACCO NON-USER: CPT | Performed by: NURSE PRACTITIONER

## 2019-12-02 PROCEDURE — G8427 DOCREV CUR MEDS BY ELIG CLIN: HCPCS | Performed by: NURSE PRACTITIONER

## 2019-12-02 PROCEDURE — 3017F COLORECTAL CA SCREEN DOC REV: CPT | Performed by: NURSE PRACTITIONER

## 2019-12-02 ASSESSMENT — ENCOUNTER SYMPTOMS: COUGH: 1

## 2020-01-03 ENCOUNTER — HOSPITAL ENCOUNTER (OUTPATIENT)
Dept: GENERAL RADIOLOGY | Age: 75
Discharge: HOME | End: 2020-01-03
Payer: MEDICARE

## 2020-01-03 PROCEDURE — 77080 DXA BONE DENSITY AXIAL: CPT

## 2020-01-07 ENCOUNTER — TELEPHONE (OUTPATIENT)
Dept: FAMILY MEDICINE CLINIC | Age: 75
End: 2020-01-07

## 2020-01-07 ENCOUNTER — OFFICE VISIT (OUTPATIENT)
Dept: FAMILY MEDICINE CLINIC | Age: 75
End: 2020-01-07
Payer: MEDICARE

## 2020-01-07 VITALS — BODY MASS INDEX: 22.32 KG/M2 | HEIGHT: 63 IN | WEIGHT: 126 LBS

## 2020-01-07 LAB
A/G RATIO: 1.7 (ref 1.1–2.2)
ALBUMIN SERPL-MCNC: 4.5 G/DL (ref 3.4–5)
ALP BLD-CCNC: 103 U/L (ref 40–129)
ALT SERPL-CCNC: 13 U/L (ref 10–40)
ANION GAP SERPL CALCULATED.3IONS-SCNC: 16 MMOL/L (ref 3–16)
AST SERPL-CCNC: 17 U/L (ref 15–37)
BILIRUB SERPL-MCNC: 0.5 MG/DL (ref 0–1)
BUN BLDV-MCNC: 10 MG/DL (ref 7–20)
CALCIUM SERPL-MCNC: 9.9 MG/DL (ref 8.3–10.6)
CHLORIDE BLD-SCNC: 97 MMOL/L (ref 99–110)
CHOLESTEROL, TOTAL: 215 MG/DL (ref 0–199)
CO2: 22 MMOL/L (ref 21–32)
CREAT SERPL-MCNC: 0.6 MG/DL (ref 0.6–1.2)
GFR AFRICAN AMERICAN: >60
GFR NON-AFRICAN AMERICAN: >60
GLOBULIN: 2.7 G/DL
GLUCOSE BLD-MCNC: 103 MG/DL (ref 70–99)
HDLC SERPL-MCNC: 87 MG/DL (ref 40–60)
LDL CHOLESTEROL CALCULATED: 111 MG/DL
POTASSIUM SERPL-SCNC: 4.4 MMOL/L (ref 3.5–5.1)
SODIUM BLD-SCNC: 135 MMOL/L (ref 136–145)
TOTAL PROTEIN: 7.2 G/DL (ref 6.4–8.2)
TRIGL SERPL-MCNC: 87 MG/DL (ref 0–150)
TSH REFLEX: 1.25 UIU/ML (ref 0.27–4.2)
VLDLC SERPL CALC-MCNC: 17 MG/DL

## 2020-01-07 PROCEDURE — 4040F PNEUMOC VAC/ADMIN/RCVD: CPT | Performed by: FAMILY MEDICINE

## 2020-01-07 PROCEDURE — G8482 FLU IMMUNIZE ORDER/ADMIN: HCPCS | Performed by: FAMILY MEDICINE

## 2020-01-07 PROCEDURE — G8427 DOCREV CUR MEDS BY ELIG CLIN: HCPCS | Performed by: FAMILY MEDICINE

## 2020-01-07 PROCEDURE — 3017F COLORECTAL CA SCREEN DOC REV: CPT | Performed by: FAMILY MEDICINE

## 2020-01-07 PROCEDURE — 1036F TOBACCO NON-USER: CPT | Performed by: FAMILY MEDICINE

## 2020-01-07 PROCEDURE — 1090F PRES/ABSN URINE INCON ASSESS: CPT | Performed by: FAMILY MEDICINE

## 2020-01-07 PROCEDURE — G8420 CALC BMI NORM PARAMETERS: HCPCS | Performed by: FAMILY MEDICINE

## 2020-01-07 PROCEDURE — G8399 PT W/DXA RESULTS DOCUMENT: HCPCS | Performed by: FAMILY MEDICINE

## 2020-01-07 PROCEDURE — 99214 OFFICE O/P EST MOD 30 MIN: CPT | Performed by: FAMILY MEDICINE

## 2020-01-07 PROCEDURE — 1123F ACP DISCUSS/DSCN MKR DOCD: CPT | Performed by: FAMILY MEDICINE

## 2020-01-07 PROCEDURE — 36415 COLL VENOUS BLD VENIPUNCTURE: CPT | Performed by: FAMILY MEDICINE

## 2020-01-07 RX ORDER — ZOLEDRONIC ACID 5 MG/100ML
5 INJECTION, SOLUTION INTRAVENOUS ONCE
Qty: 100 ML | Refills: 0 | Status: SHIPPED | OUTPATIENT
Start: 2020-01-07 | End: 2020-07-24 | Stop reason: ALTCHOICE

## 2020-01-07 RX ORDER — ACYCLOVIR 400 MG/1
400 TABLET ORAL DAILY
Qty: 90 TABLET | Refills: 3 | Status: SHIPPED | OUTPATIENT
Start: 2020-01-07 | End: 2020-10-07 | Stop reason: SDUPTHER

## 2020-01-07 ASSESSMENT — PATIENT HEALTH QUESTIONNAIRE - PHQ9
SUM OF ALL RESPONSES TO PHQ QUESTIONS 1-9: 0
2. FEELING DOWN, DEPRESSED OR HOPELESS: 0
SUM OF ALL RESPONSES TO PHQ9 QUESTIONS 1 & 2: 0
SUM OF ALL RESPONSES TO PHQ QUESTIONS 1-9: 0
1. LITTLE INTEREST OR PLEASURE IN DOING THINGS: 0

## 2020-01-07 NOTE — PROGRESS NOTES
Dr. Onel Negrovechetan [address], [place], [address]  Phone: [phone]    HPI:  Chief Complaint   Patient presents with    Hypothyroidism     6 MO HYPOTHYROID ROUTINE FOLLOW UP     Results     WANTS TO DISCUSS DEXA SCAN SHE IS TAKING CALCIUM ALREADY POSS PROLIA OR RECLAST? Antoinette Morrissey is a 75 y.o. female here for evaluation of hypothyroidism and results. Patient reports that she has recently had a DEXA scan performed. She notes that in she took oral fosamax in the past but became sick to her stomach on it. Patient states that she is taking a calcium supplement. Patient notes that she eats yogurt daily along with drinking milk. She reports that she has been taking glucosamine and notes that she has been taking it for 6 months which has helped her joints. Patient notes that she had numbness over her lateral right hip in the past and reports that diclofenac sodium 1% gel helped her and she no longer has numbness in that area. Patient notes that she had a right hip replacement in 2019 and notes that physical therapy helped her and her leg is no longer swollen. Patient notes that her hand joints, knee, and wrists do not bother her. She reports that her energy and strength have been good. She reports that she had a colonoscopy on 10/10/19 and one small polyp was found. Patient states that her bowels have been moving well.      Vitals:  BP Readings from Last 3 Encounters:   12/02/19 122/76   10/10/19 (!) 110/58   10/10/19 (!) 132/97       Pulse Readings from Last 3 Encounters:   12/02/19 86   10/10/19 66   08/12/19 85        Wt Readings from Last 3 Encounters:   01/07/20 126 lb (57.2 kg)   12/02/19 128 lb (58.1 kg)   10/10/19 131 lb (59.4 kg)        Medication Review:  Current Outpatient Medications   Medication Sig Dispense Refill    acyclovir (ZOVIRAX) 400 MG tablet Take 1 tablet by mouth daily 90 tablet 3    zoledronic acid (RECLAST) 5 MG/100ML SOLN Infuse 100 mLs intravenously once for 1 dose 100 mL 0    MISC NATURAL PRODUCTS PO Take 1 capsule by mouth 3 times daily ALPHA BASE CAPS WITHOUT IRON      MISC NATURAL PRODUCTS PO Take 1 tablet by mouth daily 4SIGHT      MISC NATURAL PRODUCTS PO Take 2 tablets by mouth daily COSMEDIX      MISC NATURAL PRODUCTS PO Take 1 tablet by mouth daily ORTHO DIGESTZYME      MISC NATURAL PRODUCTS PO Take 1 tablet by mouth daily KYOLIC      Omega-3 Fatty Acids (FISH OIL) 1200 MG CAPS Take by mouth      thyroid (ARMOUR THYROID) 90 MG tablet TAKE ONE TABLET BY MOUTH EVERY DAY to every other day as directed (Patient taking differently: TAKE ONE TABLET BY MOUTH EVERY OTHER DAY) 30 tablet 11    Misc Natural Products CAPS Take 1 capsule by mouth daily. ADRENAL CAPS      Vitamin E 100 UNITS TABS Take 1 tablet by mouth daily        No current facility-administered medications for this visit. Review of Systems:   All others are negative, except as noted in the HPI.     Patient History:  Past Medical History:   Diagnosis Date    Basal cell carcinoma     x 5    Bloating     Blood transfusion     age 26    Candida infection     stomach    Colon polyps     Dry eyes     9/16    Hepatitis B     age 26    Herpes genitalis in women 11/29/2012    HPV in female 2010    DR CHAUHAN FOLLOWS PT    Hypothyroidism     Osteoporosis     Thyroid disease     hypo        Past Surgical History:   Procedure Laterality Date    BREAST BIOPSY  2002    benign (quit ERT)    CHOLECYSTECTOMY, LAPAROSCOPIC  9/4/2003    COLONOSCOPY N/A 10/10/2019    COLONOSCOPY POLYPECTOMY SNARE/COLD BIOPSY performed by Crow Fletcher MD at [facility]  8-28-10    hemorhoidectomy   [address]    no BSO    JOINT REPLACEMENT  2009    left total hip for fracture    JOINT REPLACEMENT  2019    right hip replacement    ROTATOR CUFF REPAIR  5/17/2005    right    SKIN CANCER EXCISION  [id number]    BASAL CELL REMOVAL LEFT LEG        Social History     Socioeconomic History    Marital status:      Spouse name: Not on file    Number of children: Not on file    Years of education: Not on file    Highest education level: Not on file   Occupational History    Not on file   Social Needs    Financial resource strain: Not on file    Food insecurity:     Worry: Not on file     Inability: Not on file    Transportation needs:     Medical: Not on file     Non-medical: Not on file   Tobacco Use    Smoking status: Never Smoker    Smokeless tobacco: Never Used   Substance and Sexual Activity    Alcohol use:  Yes     Alcohol/week: 1.0 standard drinks     Types: 1 Glasses of wine per week     Comment: daily    Drug use: No    Sexual activity: Not Currently     Partners: Male   Lifestyle    Physical activity:     Days per week: Not on file     Minutes per session: Not on file    Stress: Not on file   Relationships    Social connections:     Talks on phone: Not on file     Gets together: Not on file     Attends [employer] service: Not on file     Active member of club or organization: Not on file     Attends meetings of clubs or organizations: Not on file     Relationship status: Not on file    Intimate partner violence:     Fear of current or ex partner: Not on file     Emotionally abused: Not on file     Physically abused: Not on file     Forced sexual activity: Not on file   Other Topics Concern    Not on file   Social History Narrative    Not on file        Family History   Problem Relation Age of Onset    Cancer Father         spine    Cancer Sister         pancreas    Substance Abuse Brother         alcohol    Liver Disease Brother         cirrhosis    Alzheimer's Disease Mother       DEXA Scan:     FINDINGS:   LUMBAR SPINE:       The bone mineral density in the lumbar spine including the L1 through L4   levels is measured at 0.817 g/cm2, which corresponds to a T-score of -2.10   and a Z-score of 0.30.  This is within the osteopenia AST 17 07/05/2019    ALT 10 07/05/2019    LABGLOM >60 08/08/2019    GFRAA >60 08/08/2019    AGRATIO 1.8 07/05/2019    GLOB 2.5 07/05/2019       Lab Results   Component Value Date    CHOL 264 (H) 01/04/2019    TRIG 94 01/04/2019    HDL 93 (H) 01/04/2019    LDLCALC 152 (H) 01/04/2019    LABVLDL 19 01/04/2019       Lab Results   Component Value Date    TSH 0.84 07/05/2019    V6VIJZP 0.89 12/09/2015    FT3 3.6 06/15/2018    T4FREE 1.2 07/05/2019        Lab Results   Component Value Date    VITD25 43.1 06/15/2018    VITD25 39.9 12/15/2017    VITD25 33.7 12/05/2016            Health Maintenance Review:  Health Maintenance Due   Topic Date Due    Shingles Vaccine (2 of 3) 07/02/2014    Pneumococcal 65+ years Vaccine (2 of 2 - PPSV23) 12/16/2016    Annual Wellness Visit (AWV)  05/29/2019         Immunizations:  Immunization History   Administered Date(s) Administered    Influenza Vaccine, unspecified formulation 09/23/2015    Influenza Whole 10/23/2009    Influenza, High Dose (Fluzone 65 yrs and older) 10/22/2013, 12/03/2014, 09/23/2015, 09/19/2016, 10/20/2017, 10/01/2018    Influenza, Triv, inactivated, subunit, adjuvanted, IM (Fluad 65 yrs and older) 10/17/2019    Pneumococcal Conjugate 13-valent ([id number]) 12/16/2015, 12/16/2015    Pneumococcal Conjugate 7-valent (Prevnar7) 10/23/2009    Pneumococcal Polysaccharide ([id number]) 10/23/2009    Tdap (Boostrix, Adacel) 05/11/2011, 09/29/2013    Zoster Live (Zostavax) 05/07/2014         Assessment:   1. Hypothyroidism due to acquired atrophy of thyroid    2. Herpes genitalis in women    3. Age-related osteoporosis without current pathological fracture    4. Hip arthritis    5. Hyponatremia    6. Hypercholesteremia           Plan:  Hypothyroidism due to acquired atrophy of thyroid  -     TSH with Reflex; Future  - Continue on armour thyroid 90 mg 1 tablet every other day     Herpes genitalis in women  -     acyclovir (ZOVIRAX) 400 MG tablet;  Take 1 tablet by mouth daily    Age-related osteoporosis without current pathological fracture  -     zoledronic acid (RECLAST) 5 MG/100ML SOLN; Infuse 100 mLs intravenously once for 1 dose  Insurance pre authorization to be done by our office   - Recommended 600 mg calcium and 400 to 1000 units of Vitamin D daily   - Discussed foods high in Vitamin D     Hip arthritis  - Doing well after using diclofenac sodium gel on right hip and no longer needs it    Hyponatremia  -     Comprehensive Metabolic Panel; Future  Limit overall fluid intake  Hypercholesteremia  -     Comprehensive Metabolic Panel; Future  -     Lipid Panel; Future  - Continue on omega-3 fatty acids 1200 mg 1 tablet daily     Reviewed previous labs and discussed results with patient:  - TSH and T4 within normal limits  - Sodium low on previous BMP  - orders for TSH with reflex, CMP, and Lipid Panel have been placed and will be done today; will contact patient if there are any abnormal results or findings   -consider shingrix o/w utd on immunizations    I have reviewed patient's pertinent medical history, relevant laboratory and imaging studies, and past/future health maintenance. Patient's medications have been reviewed and were discussed with the patient. Refills given today, see note below. Discussed with the patient the importance of adhering to their current medication regimen as directed. Advised the patient that they should continue to work on eating a healthy balanced diet and staying active by exercising within their personal limits. Patient was advised to keep future appointments with their respective specialty care team(s). Patient had the opportunity to ask questions, all of which were answered to the best of my ability and with patient satisfaction. Patient advised to schedule a return visit for reevaluation in 6 months. Patient understands and is agreeable with the care plan following today's visit.  Patient is to schedule an appointment for any new or worsening symptoms. Requested Prescriptions     Signed Prescriptions Disp Refills    acyclovir (ZOVIRAX) 400 MG tablet 90 tablet 3     Sig: Take 1 tablet by mouth daily    zoledronic acid (RECLAST) 5 MG/100ML SOLN 100 mL 0     Sig: Infuse 100 mLs intravenously once for 1 dose      Orders Placed This Encounter   Procedures    TSH with Reflex     Standing Status:   Future     Standing Expiration Date:   1/7/2021    Comprehensive Metabolic Panel     Standing Status:   Future     Standing Expiration Date:   1/7/2021    Lipid Panel     Standing Status:   Future     Standing Expiration Date:   1/7/2021     Order Specific Question:   Is Patient Fasting?/# of Hours     Answer:   8 hours           By signing my name below, I, Duane Merritt, attest that this documentation has been prepared under the direction and in the presence of Jamie Sánchez MD.   Electronically Signed: Duane Merritt, Scribe, 1/7/2020, 9:36 AM.      IJamie MD, personally performed the services described in this documentation. All medical record entries made by the scribe were at my direction and in my presence. I have reviewed the chart and discharge instructions (if applicable) and agree that the record reflects my personal performance and is accurate and complete.  Jamie Sánchez MD, 1/7/2020, 1:01 PM.

## 2020-01-08 ENCOUNTER — OFFICE VISIT (OUTPATIENT)
Dept: ENT CLINIC | Age: 75
End: 2020-01-08
Payer: MEDICARE

## 2020-01-08 VITALS — OXYGEN SATURATION: 96 % | SYSTOLIC BLOOD PRESSURE: 122 MMHG | HEART RATE: 75 BPM | DIASTOLIC BLOOD PRESSURE: 68 MMHG

## 2020-01-08 PROCEDURE — 69210 REMOVE IMPACTED EAR WAX UNI: CPT | Performed by: OTOLARYNGOLOGY

## 2020-01-09 RX ORDER — SODIUM CHLORIDE 0.9 % (FLUSH) 0.9 %
10 SYRINGE (ML) INJECTION PRN
Status: CANCELLED | OUTPATIENT
Start: 2020-01-09

## 2020-01-09 RX ORDER — DIPHENHYDRAMINE HYDROCHLORIDE 50 MG/ML
50 INJECTION INTRAMUSCULAR; INTRAVENOUS ONCE
Status: CANCELLED | OUTPATIENT
Start: 2020-01-09

## 2020-01-09 RX ORDER — ZOLEDRONIC ACID 5 MG/100ML
5 INJECTION, SOLUTION INTRAVENOUS ONCE
Status: CANCELLED | OUTPATIENT
Start: 2020-01-09

## 2020-01-09 RX ORDER — EPINEPHRINE 1 MG/ML
0.3 INJECTION, SOLUTION, CONCENTRATE INTRAVENOUS PRN
Status: CANCELLED | OUTPATIENT
Start: 2020-01-09

## 2020-01-09 RX ORDER — METHYLPREDNISOLONE SODIUM SUCCINATE 125 MG/2ML
125 INJECTION, POWDER, LYOPHILIZED, FOR SOLUTION INTRAMUSCULAR; INTRAVENOUS ONCE
Status: CANCELLED | OUTPATIENT
Start: 2020-01-09

## 2020-01-09 RX ORDER — SODIUM CHLORIDE 9 MG/ML
INJECTION, SOLUTION INTRAVENOUS CONTINUOUS
Status: CANCELLED | OUTPATIENT
Start: 2020-01-09

## 2020-01-15 ENCOUNTER — TELEPHONE (OUTPATIENT)
Dept: FAMILY MEDICINE CLINIC | Age: 75
End: 2020-01-15

## 2020-02-11 RX ORDER — LEVOTHYROXINE AND LIOTHYRONINE 57; 13.5 UG/1; UG/1
TABLET ORAL
Qty: 30 TABLET | Refills: 3 | Status: SHIPPED | OUTPATIENT
Start: 2020-02-11 | End: 2020-07-24 | Stop reason: SDUPTHER

## 2020-04-03 RX ORDER — FLUCONAZOLE 150 MG/1
150 TABLET ORAL ONCE
Qty: 2 TABLET | Refills: 0 | Status: SHIPPED | OUTPATIENT
Start: 2020-04-03 | End: 2020-04-03

## 2020-04-03 NOTE — TELEPHONE ENCOUNTER
Patient is currently taking Doxycyline 500 BID from her eye doctor. Is requesting diflucan be called into the pharmacy for her. She is prone to yeast infection

## 2020-05-29 ENCOUNTER — TELEPHONE (OUTPATIENT)
Dept: FAMILY MEDICINE CLINIC | Age: 75
End: 2020-05-29

## 2020-05-29 RX ORDER — FLUCONAZOLE 150 MG/1
150 TABLET ORAL
Qty: 2 TABLET | Refills: 0 | Status: SHIPPED | OUTPATIENT
Start: 2020-05-29 | End: 2020-11-30 | Stop reason: SDUPTHER

## 2020-05-29 RX ORDER — FLUCONAZOLE 100 MG/1
100 TABLET ORAL DAILY
Qty: 3 TABLET | Refills: 0 | Status: CANCELLED | OUTPATIENT
Start: 2020-05-29 | End: 2020-06-01

## 2020-06-03 ENCOUNTER — TELEPHONE (OUTPATIENT)
Dept: FAMILY MEDICINE CLINIC | Age: 75
End: 2020-06-03

## 2020-06-03 NOTE — TELEPHONE ENCOUNTER
Patient is calling because her pharmacy told her to call us because the medication THYROID (ARMOUR THYROID) 90 MG TABLET - HAS BEEN RECALLED. 1 TABLET EVERY OTHER DAY she takes. She tried calling the company but no answer, phone number she tried [phone]. What does she need to do? Please give her a call back. She only has 10 tablets left. She got this medication on  4/3/2020. [address]      She has been taking this medication for years.

## 2020-06-08 ENCOUNTER — HOSPITAL ENCOUNTER (OUTPATIENT)
Age: 75
Discharge: HOME | End: 2020-06-08
Payer: MEDICARE

## 2020-06-08 LAB
ANION GAP SERPL CALCULATED.3IONS-SCNC: 14 MMOL/L (ref 3–16)
BUN BLDV-MCNC: 12 MG/DL (ref 7–20)
CALCIUM SERPL-MCNC: 9.8 MG/DL (ref 8.3–10.6)
CHLORIDE BLD-SCNC: 98 MMOL/L (ref 99–110)
CO2: 25 MMOL/L (ref 21–32)
CREAT SERPL-MCNC: 0.8 MG/DL (ref 0.6–1.2)
GFR AFRICAN AMERICAN: >60
GFR NON-AFRICAN AMERICAN: >60
GLUCOSE BLD-MCNC: 100 MG/DL (ref 70–99)
POTASSIUM SERPL-SCNC: 4.4 MMOL/L (ref 3.5–5.1)
SODIUM BLD-SCNC: 137 MMOL/L (ref 136–145)
T4 FREE: 1.1 NG/DL (ref 0.9–1.8)
TSH REFLEX: 0.92 UIU/ML (ref 0.27–4.2)

## 2020-06-08 PROCEDURE — 80048 BASIC METABOLIC PNL TOTAL CA: CPT

## 2020-06-08 PROCEDURE — 36415 COLL VENOUS BLD VENIPUNCTURE: CPT

## 2020-06-08 PROCEDURE — 84439 ASSAY OF FREE THYROXINE: CPT

## 2020-06-08 PROCEDURE — 84443 ASSAY THYROID STIM HORMONE: CPT

## 2020-06-08 NOTE — TELEPHONE ENCOUNTER
SPOKE TO PT IN DETAIL, LAST MONTH SHE RECEIVED NP ARMOUR THYROID FROM THE PHARMACY INSTEAD OF ARMOUR THYROID. PT UPSET AND CONCERNED SHE GOT THE RECALL DOSES. I CALLED [facility] AND SHE DID GET GENERIC EQUIVALENT NP THYROID LAST MONTH BUT IF PT WANTS THEY CAN CHANGE TO GENARO AND THE COST IS $51 A MONTH INSTEAD OF $36.  PT WANTS TO GET THYROID LEVELS DONE TO MAKE SURE EVERYTHING IS OK. OK PER [facility] TO THYROID LEVELS. PT UNDERSTANDS.   [address]

## 2020-06-24 ENCOUNTER — TELEPHONE (OUTPATIENT)
Dept: FAMILY MEDICINE CLINIC | Age: 75
End: 2020-06-24

## 2020-06-24 NOTE — TELEPHONE ENCOUNTER
Give it another 1-2 days as usually 1 dose of diflucan is effective. If fails to improve over that time - will call in rx for topical treatment.   Can use gyne-lotrimin or monistat topically otc now as well

## 2020-07-13 ENCOUNTER — OFFICE VISIT (OUTPATIENT)
Dept: ENT CLINIC | Age: 75
End: 2020-07-13
Payer: MEDICARE

## 2020-07-13 VITALS — TEMPERATURE: 97.5 F | SYSTOLIC BLOOD PRESSURE: 138 MMHG | DIASTOLIC BLOOD PRESSURE: 78 MMHG

## 2020-07-13 PROCEDURE — 69210 REMOVE IMPACTED EAR WAX UNI: CPT | Performed by: OTOLARYNGOLOGY

## 2020-07-13 NOTE — PROGRESS NOTES
The patient returns with concerns about ear fullness suspected to be a wax build up. This is  a recurring problem requiring cleaning every 6 months     There has not been associated ear pain, tinnitus, or otorrhea. The patient is recovering from a right hip replacement. She is no longer riding her motorcycle. I was unable to visualize  both  tympanic membranes completely due to excessive cerumen and debris in the ear canal. The ear was not sensitive to touch. There was not gilma auricular involvement. Both ear canals were cleaned of cerumen, keratinaceous and squamous debis by curettage, suction/irrigation. The patient tolerated this well. Following removal, the external auditory canal was normal. The tympanic membrane was intact and there was good aeration of the middle ear space. Immediate subjective relief noted after removal.           IMPRESSION: Cerumen impaction, AU, resolved after cleaning                       PLAN: Long term care of the ear canal was discussed. Earwax suggestions   1. Do not allow Q- tips to enter your ear canal   2. Place 2 or 3 drops of rubbing alcohol in each ear monthly. 3. Allow drops to stay in for a minute per side, then use cotton ball to \"mop up. \"       Return 6 months

## 2020-07-24 ENCOUNTER — OFFICE VISIT (OUTPATIENT)
Dept: FAMILY MEDICINE CLINIC | Age: 75
End: 2020-07-24
Payer: MEDICARE

## 2020-07-24 VITALS
SYSTOLIC BLOOD PRESSURE: 112 MMHG | HEIGHT: 63 IN | HEART RATE: 76 BPM | DIASTOLIC BLOOD PRESSURE: 64 MMHG | BODY MASS INDEX: 22.22 KG/M2 | OXYGEN SATURATION: 96 % | WEIGHT: 125.4 LBS | TEMPERATURE: 97.2 F

## 2020-07-24 PROCEDURE — 3017F COLORECTAL CA SCREEN DOC REV: CPT | Performed by: NURSE PRACTITIONER

## 2020-07-24 PROCEDURE — 1123F ACP DISCUSS/DSCN MKR DOCD: CPT | Performed by: NURSE PRACTITIONER

## 2020-07-24 PROCEDURE — 90732 PPSV23 VACC 2 YRS+ SUBQ/IM: CPT | Performed by: NURSE PRACTITIONER

## 2020-07-24 PROCEDURE — 4040F PNEUMOC VAC/ADMIN/RCVD: CPT | Performed by: NURSE PRACTITIONER

## 2020-07-24 PROCEDURE — G0439 PPPS, SUBSEQ VISIT: HCPCS | Performed by: NURSE PRACTITIONER

## 2020-07-24 PROCEDURE — G0009 ADMIN PNEUMOCOCCAL VACCINE: HCPCS | Performed by: NURSE PRACTITIONER

## 2020-07-24 RX ORDER — LEVOTHYROXINE AND LIOTHYRONINE 57; 13.5 UG/1; UG/1
TABLET ORAL
Qty: 30 TABLET | Refills: 11 | Status: SHIPPED | OUTPATIENT
Start: 2020-07-24 | End: 2021-07-28 | Stop reason: SDUPTHER

## 2020-07-24 RX ORDER — ZOSTER VACCINE RECOMBINANT, ADJUVANTED 50 MCG/0.5
0.5 KIT INTRAMUSCULAR SEE ADMIN INSTRUCTIONS
Qty: 0.5 ML | Refills: 0 | Status: SHIPPED | OUTPATIENT
Start: 2020-07-24 | End: 2020-09-03

## 2020-07-24 ASSESSMENT — LIFESTYLE VARIABLES
AUDIT TOTAL SCORE: 4
HOW OFTEN DURING THE LAST YEAR HAVE YOU HAD A FEELING OF GUILT OR REMORSE AFTER DRINKING: 0
HAVE YOU OR SOMEONE ELSE BEEN INJURED AS A RESULT OF YOUR DRINKING: 0
AUDIT-C TOTAL SCORE: 4
HOW MANY STANDARD DRINKS CONTAINING ALCOHOL DO YOU HAVE ON A TYPICAL DAY: 0
HAS A RELATIVE, FRIEND, DOCTOR, OR ANOTHER HEALTH PROFESSIONAL EXPRESSED CONCERN ABOUT YOUR DRINKING OR SUGGESTED YOU CUT DOWN: 0
HOW OFTEN DO YOU HAVE SIX OR MORE DRINKS ON ONE OCCASION: 0
HOW OFTEN DO YOU HAVE A DRINK CONTAINING ALCOHOL: 4
HOW OFTEN DURING THE LAST YEAR HAVE YOU NEEDED AN ALCOHOLIC DRINK FIRST THING IN THE MORNING TO GET YOURSELF GOING AFTER A NIGHT OF HEAVY DRINKING: 0
HOW OFTEN DURING THE LAST YEAR HAVE YOU FOUND THAT YOU WERE NOT ABLE TO STOP DRINKING ONCE YOU HAD STARTED: 0
HOW OFTEN DURING THE LAST YEAR HAVE YOU BEEN UNABLE TO REMEMBER WHAT HAPPENED THE NIGHT BEFORE BECAUSE YOU HAD BEEN DRINKING: 0
HOW OFTEN DURING THE LAST YEAR HAVE YOU FAILED TO DO WHAT WAS NORMALLY EXPECTED FROM YOU BECAUSE OF DRINKING: 0

## 2020-07-24 ASSESSMENT — PATIENT HEALTH QUESTIONNAIRE - PHQ9
SUM OF ALL RESPONSES TO PHQ QUESTIONS 1-9: 2
SUM OF ALL RESPONSES TO PHQ QUESTIONS 1-9: 0
SUM OF ALL RESPONSES TO PHQ QUESTIONS 1-9: 2
SUM OF ALL RESPONSES TO PHQ QUESTIONS 1-9: 0

## 2020-07-24 NOTE — PROGRESS NOTES
Medicare Annual Wellness Visit  Name: Brittni Castro Date: 2020   MRN: [id number] Sex: Female   Age: 75 y.o. Ethnicity: Non-/Non    : 1945 Race: Fern Celis is here for Thyroid Problem (ROUTINE FOLLOW UP FOR THYROID); Hyperlipidemia (ROUTINE FOLLOW UP FOR CHOL); and Medicare AWV (MEDICARE AWV)    Screenings for behavioral, psychosocial and functional/safety risks, and cognitive dysfunction are all negative except as indicated below. These results, as well as other patient data from the [address] form, are documented in Flowsheets linked to this Encounter. Chief Complaint   Patient presents with    Thyroid Problem     ROUTINE FOLLOW UP FOR THYROID    Hyperlipidemia     ROUTINE FOLLOW UP FOR CHOL    Medicare AWV     MEDICARE AWV       Hypothyroidism- she denies  fatigue, weight gain, feeling cold and cold intolerance, constipation, swelling, change in skin,  nails, or hair, depression, goiter (thyroid enlargement.) - she does take her medication daily  On an empty    Allergies   Allergen Reactions    Alendronate      Stomach upset    Doxycycline Other (See Comments)     Abdominal pain    Penicillins Hives    Sulfa Antibiotics     Zithromax [Azithromycin]        Prior to Visit Medications    Medication Sig Taking? Authorizing Provider   NONFORMULARY OSTEO BASE Yes Historical Provider, MD   thyroid (ARMOUR THYROID) 90 MG tablet TAKE ONE TABLET BY MOUTH EVERY OTHER DAY Yes MD CAM Lawler NATURAL PRODUCTS PO Take by mouth ostebase (Ca+Vit D +Vit.  K+ mg+) Yes Historical Provider, MD   acyclovir (ZOVIRAX) 400 MG tablet Take 1 tablet by mouth daily Yes MD CAM Lawler NATURAL PRODUCTS PO Take 1 capsule by mouth 3 times daily ALPHA BASE CAPS WITHOUT IRON Yes Historical Provider, MD   MISC NATURAL PRODUCTS PO Take 1 tablet by mouth daily 4SIGHT Yes Historical Provider, MD   MISC NATURAL PRODUCTS PO Take 2 tablets by mouth daily COSMEDIX Yes (Family Medicine)  Jocelynn Ronquillo MD as PCP - [facility] Empaneled Provider  Alisha Davis MD as Consulting Physician (Otolaryngology)  Jackelyn Anand as Consulting Physician (Dermatology)  Tana Wilkerson MD as Consulting Physician (Gynecology)  Joseph Anne MD as Consulting Physician (Otolaryngology)    Wt Readings from Last 3 Encounters:   07/24/20 125 lb 6.4 oz (56.9 kg)   01/07/20 126 lb (57.2 kg)   12/02/19 128 lb (58.1 kg)     Vitals:    07/24/20 0813   BP: 112/64   Site: Right Upper Arm   Position: Sitting   Cuff Size: Medium Adult   Pulse: 76   SpO2: 96%   Weight: 125 lb 6.4 oz (56.9 kg)   Height: 5' 3\" (1.6 m)     Body mass index is 22.21 kg/m². Based upon direct observation of the patient, evaluation of cognition reveals recent and remote memory intact. Patient's complete Health Risk Assessment and screening values have been reviewed and are found in Flowsheets. The following problems were reviewed today and where indicated follow up appointments were made and/or referrals ordered.     Positive Risk Factor Screenings with Interventions:     Safety:  Safety  Do you have working smoke detectors?: Yes  Have all throw rugs been removed or fastened?: Yes  Do you have non-slip mats or surfaces in all bathtubs/showers?: Yes  Do all of your stairways have a railing or banister?: (!) No no stairs in home  Are your doorways, halls and stairs free of clutter?: Yes  Do you always fasten your seatbelt when you are in a car?: Yes  Safety Interventions:  · Home safety tips provided    Personalized Preventive Plan   Current Health Maintenance Status  Immunization History   Administered Date(s) Administered    Influenza Vaccine, unspecified formulation 09/23/2015    Influenza Whole 10/23/2009    Influenza, High Dose (Fluzone 65 yrs and older) 10/22/2013, 12/03/2014, 09/23/2015, 09/19/2016, 10/20/2017, 10/01/2018    Influenza, Triv, inactivated, subunit, adjuvanted, IM (Fluad 65 yrs and older) 10/17/2019  Pneumococcal Conjugate 13-valent ([id number]) 12/16/2015, 12/16/2015    Pneumococcal Conjugate 7-valent (Prevnar7) 10/23/2009    Pneumococcal Polysaccharide ([id number]) 10/23/2009    Tdap (Boostrix, Adacel) 05/11/2011, 09/29/2013    Zoster Live (Zostavax) 05/07/2014        Health Maintenance   Topic Date Due    Shingles Vaccine (2 of 3) 07/02/2014    Pneumococcal 65+ years Vaccine (2 of 2 - PPSV23) 12/16/2016    Annual Wellness Visit (AWV)  05/29/2019    Flu vaccine (1) 09/01/2020    TSH testing  06/08/2021    DTaP/Tdap/Td vaccine (3 - Td) 09/29/2023    Lipid screen  01/07/2025    Colon cancer screen colonoscopy  10/10/2029    DEXA (modify frequency per FRAX score)  Completed    Hepatitis A vaccine  Aged Out    Hepatitis B vaccine  Aged Out    Hib vaccine  Aged Out    Meningococcal (ACWY) vaccine  Aged Out     Recommendations for [employer] Due: see orders and patient instructions/AVS.  . Recommended screening schedule for the next 5-10 years is provided to the patient in written form: see Patient Instructions/AVS.    Nestor Mccann was seen today for thyroid problem, hyperlipidemia and medicare awv. Diagnoses and all orders for this visit:    Routine general medical examination at a health care facility  [address] [place] [address] PAST    Hypothyroidism due to acquired atrophy of thyroid  -     thyroid (ARMOUR THYROID) 90 MG tablet; TAKE ONE TABLET BY MOUTH EVERY OTHER DAY    Herpes genitalis in women  CONTINUE ZOVIRAX AS NEEDED  Need for shingles vaccine  -     zoster recombinant adjuvanted vaccine (SHINGRIX) 50 MCG/0.5ML SUSR injection;  Inject 0.5 mLs into the muscle See Admin Instructions 1 dose now and repeat in 2-6 months    Need for 23-polyvalent pneumococcal polysaccharide vaccine  -     Pneumococcal polysaccharide vaccine 23-valent greater than or equal to 3yo subcutaneous/IM

## 2020-07-24 NOTE — PATIENT INSTRUCTIONS
Personalized Preventive Plan for Carlos Arceo - 7/24/2020  Medicare offers a range of preventive health benefits. Some of the tests and screenings are paid in full while other may be subject to a deductible, co-insurance, and/or copay. Some of these benefits include a comprehensive review of your medical history including lifestyle, illnesses that may run in your family, and various assessments and screenings as appropriate. After reviewing your medical record and screening and assessments performed today your provider may have ordered immunizations, labs, imaging, and/or referrals for you. A list of these orders (if applicable) as well as your Preventive Care list are included within your After Visit Summary for your review. Other Preventive Recommendations:    · A preventive eye exam performed by an eye specialist is recommended every 1-2 years to screen for glaucoma; cataracts, macular degeneration, and other eye disorders. · A preventive dental visit is recommended every 6 months. · Try to get at least 150 minutes of exercise per week or 10,000 steps per day on a pedometer . · Order or download the FREE \"Exercise & Physical Activity: Your Everyday Guide\" from [employer]. Call 1-863.686.5373 or search [employer] online. · You need 0391-9592 mg of calcium and 7329-4212 IU of vitamin D per day. It is possible to meet your calcium requirement with diet alone, but a vitamin D supplement is usually necessary to meet this goal.  · When exposed to the sun, use a sunscreen that protects against both UVA and UVB radiation with an SPF of 30 or greater. Reapply every 2 to 3 hours or after sweating, drying off with a towel, or swimming. · Always wear a seat belt when traveling in a car. Always wear a helmet when riding a bicycle or motorcycle.   Patient Education        Hypothyroidism: Care Instructions  Your Care Instructions     When you have hypothyroidism, your body doesn't make enough thyroid hormone. This hormone helps your body use energy. If your thyroid level is low, you may feel tired, be constipated, have an increase in your blood pressure, or have dry skin or memory problems. You may also get cold easily, even when it is warm. Women with low thyroid levels may have heavy menstrual periods. A blood test to find your thyroid-stimulating hormone (TSH) level is used to check for hypothyroidism. A high TSH level may mean that you have it. The treatment for hypothyroidism is thyroid hormone pills. You should start to feel better in 1 to 2 weeks. Most people need treatment for the rest of their lives. You will need regular visits with your doctor to make sure you are doing well and that you have the right dose of medicine. Follow-up care is a key part of your treatment and safety. Be sure to make and go to all appointments, and call your doctor if you are having problems. It's also a good idea to know your test results and keep a list of the medicines you take. How can you care for yourself at home? Take your thyroid hormone medicine exactly as prescribed. Call your doctor if you think you are having a problem with your medicine. Most people do not have side effects if they take the right amount of medicine regularly. Take the medicine 30 minutes before breakfast, and do not take it with calcium, vitamins, or iron. Do not take extra doses of your thyroid medicine. It will not help you get better any faster, and it may cause side effects. If you forget to take a dose, do NOT take a double dose of medicine. Take your usual dose the next day. Tell your doctor about all prescription, herbal, or over-the-counter products you take. Take care of yourself. Eat a healthy diet, get enough sleep, and get regular exercise. When should you call for help? [id number] anytime you think you may need emergency care. For example, call if:  You passed out (lost consciousness).   You have severe trouble breathing. You have a very slow heartbeat (less than 60 beats a minute). You have a low body temperature (95°F or below). Call your doctor now or seek immediate medical care if:  You feel tired, sluggish, or weak. You have trouble remembering things or concentrating. You do not begin to feel better 2 weeks after starting your medicine. Watch closely for changes in your health, and be sure to contact your doctor if you have any problems. Where can you learn more? Go to https://[employer]iceweb.[email or web address]. org and sign in to your [employer] account. Enter B549 in the [employer] box to learn more about \"Hypothyroidism: Care Instructions. \"     If you do not have an account, please click on the \"Sign Up Now\" link. Current as of: July 29, 2019               Content Version: 12.5  © 9134-4248 [employer]. Care instructions adapted under license by [facility] ([facility]). If you have questions about a medical condition or this instruction, always ask your healthcare professional. [address] any warranty or liability for your use of this information. Patient Education        High Cholesterol: Care Instructions  Your Care Instructions     Cholesterol is a type of fat in your blood. It is needed for many body functions, such as making new cells. Cholesterol is made by your body. It also comes from food you eat. High cholesterol means that you have too much of the fat in your blood. This raises your risk of a heart attack and stroke. LDL and HDL are part of your total cholesterol. LDL is the \"bad\" cholesterol. High LDL can raise your risk for heart disease, heart attack, and stroke. HDL is the \"good\" cholesterol. It helps clear bad cholesterol from the body. High HDL is linked with a lower risk of heart disease, heart attack, and stroke. Your cholesterol levels help your doctor find out your risk for having a heart attack or stroke.  You and your doctor can talk about whether you need to lower your risk and what treatment is best for you. A heart-healthy lifestyle along with medicines can help lower your cholesterol and your risk. The way you choose to lower your risk will depend on how high your risk is for heart attack and stroke. It will also depend on how you feel about taking medicines. Follow-up care is a key part of your treatment and safety. Be sure to make and go to all appointments, and call your doctor if you are having problems. It's also a good idea to know your test results and keep a list of the medicines you take. How can you care for yourself at home? Eat a variety of foods every day. Good choices include fruits, vegetables, whole grains (like oatmeal), dried beans and peas, nuts and seeds, soy products (like tofu), and fat-free or low-fat dairy products. Replace butter, margarine, and hydrogenated or partially hydrogenated oils with olive and canola oils. (Canola oil margarine without trans fat is fine.)  Replace red meat with fish, poultry, and soy protein (like tofu). Limit processed and packaged foods like chips, crackers, and cookies. Bake, broil, or steam foods. Don't petit them. Be physically active. Get at least 30 minutes of exercise on most days of the week. Walking is a good choice. You also may want to do other activities, such as running, swimming, cycling, or playing tennis or team sports. Stay at a healthy weight or lose weight by making the changes in eating and physical activity listed above. Losing just a small amount of weight, even 5 to 10 pounds, can reduce your risk for having a heart attack or stroke. Do not smoke. When should you call for help? Watch closely for changes in your health, and be sure to contact your doctor if:  You need help making lifestyle changes. You have questions about your medicine. Where can you learn more? Go to [email or web address].[email or web address]. org and sign in to your MyChart

## 2020-07-27 ENCOUNTER — TELEPHONE (OUTPATIENT)
Dept: FAMILY MEDICINE CLINIC | Age: 75
End: 2020-07-27

## 2020-07-27 NOTE — TELEPHONE ENCOUNTER
PATIENT TOLD LAURA ABOUT THE MICE IN HER HOUSE - AND SHE WAS WONDERING IF SHE SHOULD TAKE SOMETHING TO CALM HER DOWN - SHE IS WANTING LAURA'S OPINION    PT @  [address] [facility] [place], [address] [place] [address] [phone]

## 2020-08-24 ENCOUNTER — TELEPHONE (OUTPATIENT)
Dept: FAMILY MEDICINE CLINIC | Age: 75
End: 2020-08-24

## 2020-08-24 RX ORDER — HYDROXYZINE HYDROCHLORIDE 25 MG/1
25 TABLET, FILM COATED ORAL EVERY 8 HOURS PRN
Qty: 30 TABLET | Refills: 0 | Status: SHIPPED | OUTPATIENT
Start: 2020-08-24 | End: 2020-09-03

## 2020-08-24 NOTE — TELEPHONE ENCOUNTER
Patient is having trouble with mice in her home. They are working on it but she is a nervous wreck.  Asking if you can call something very mild in for her

## 2020-08-26 ENCOUNTER — HOSPITAL ENCOUNTER (OUTPATIENT)
Dept: WOMENS IMAGING | Age: 75
Discharge: HOME | End: 2020-08-26
Payer: MEDICARE

## 2020-08-26 ENCOUNTER — HOSPITAL ENCOUNTER (OUTPATIENT)
Dept: ULTRASOUND IMAGING | Age: 75
Discharge: HOME | End: 2020-08-26
Payer: MEDICARE

## 2020-08-26 PROCEDURE — 76642 ULTRASOUND BREAST LIMITED: CPT

## 2020-08-26 PROCEDURE — G0279 TOMOSYNTHESIS, MAMMO: HCPCS

## 2020-09-02 ENCOUNTER — TELEPHONE (OUTPATIENT)
Dept: FAMILY MEDICINE CLINIC | Age: 75
End: 2020-09-02

## 2020-09-02 NOTE — TELEPHONE ENCOUNTER
PT @  [phone]      SHE IS TAKING HYDOOXYZINE - IT MAKES HER SICK - SHE CAN'T DRIVE - HEART BEATING FAST     PATIENT INSISTING ON SEEING A DOCTOR NOT A NP - SHE SEES A COUNSELOR AND THEY RECOMMENDED HER SEEING A DOCTOR TO GO OVER HER MEDICATIONS

## 2020-09-03 ENCOUNTER — OFFICE VISIT (OUTPATIENT)
Dept: FAMILY MEDICINE CLINIC | Age: 75
End: 2020-09-03
Payer: MEDICARE

## 2020-09-03 VITALS
SYSTOLIC BLOOD PRESSURE: 126 MMHG | HEART RATE: 78 BPM | BODY MASS INDEX: 21.62 KG/M2 | DIASTOLIC BLOOD PRESSURE: 64 MMHG | HEIGHT: 63 IN | OXYGEN SATURATION: 99 % | TEMPERATURE: 97.8 F | WEIGHT: 122 LBS

## 2020-09-03 LAB
BILIRUBIN, POC: NORMAL
BLOOD URINE, POC: NORMAL
CLARITY, POC: CLEAR
COLOR, POC: YELLOW
GLUCOSE URINE, POC: NORMAL
KETONES, POC: NORMAL
LEUKOCYTE EST, POC: NORMAL
NITRITE, POC: NORMAL
PH, POC: 6.5
PROTEIN, POC: NORMAL
SPECIFIC GRAVITY, POC: 1.02
UROBILINOGEN, POC: 0.2

## 2020-09-03 PROCEDURE — 99213 OFFICE O/P EST LOW 20 MIN: CPT | Performed by: NURSE PRACTITIONER

## 2020-09-03 PROCEDURE — 81002 URINALYSIS NONAUTO W/O SCOPE: CPT | Performed by: NURSE PRACTITIONER

## 2020-09-03 NOTE — PROGRESS NOTES
9/3/2020     Anjana Luther (:  1945) is a 75 y.o. female, here for evaluation of the following medical concerns:    KEERTHI Patel is here today for anxiety and depression. She was seen by a therapist recently who performed anxiety and depression screenings. Based upon the results, she was encouraged to make an appointment. She states that she has been dealing with mice in her house for years. She has used multiple services to try to get rid of them, and none of them have been successful. This brings her anxiety due to the mice themselves as well as financial strain. The therapist was recommend by a friend. She felt that this helped her, but the initial visit was to establish herself. She is scheduled to see them next week as well. The therapist had recommended moving out of the house. While the patient states that this would relieve a lot of stress, she is concerned that she would not be able to sell the house. With this anxiety, she had called last month and was prescribed hydroxyzine. She states that it makes her too drowsy, and because of this can only be helpful at nighttime. She feels that she needs more help during the day as well. She has stopped taking the medication at this time. She also states she has been having frequency with urination. This is a recurrent issue for her. She wakes up multiple times overnight to go to the bathroom. Denies painful urination or hematuria. She has seen a urologist in the past regarding this, but they have since retired. She would like to be checked for a UTI. Review of Systems   Constitutional: Negative for chills and fever. Genitourinary: Positive for frequency. Negative for decreased urine volume, difficulty urinating, dysuria, enuresis, flank pain, hematuria, pelvic pain and urgency. Neurological: Negative for dizziness, tremors, weakness, light-headedness and numbness. Psychiatric/Behavioral: Positive for dysphoric mood.  Negative for agitation, behavioral problems, confusion, decreased concentration, self-injury, sleep disturbance and suicidal ideas. The patient is nervous/anxious. The patient is not hyperactive. Prior to Visit Medications    Medication Sig Taking? Authorizing Provider   Red Yeast Rice Extract (RED YEAST RICE PO) Take by mouth Yes Historical Provider, MD   NONFORMULARY OSTEO BASE Yes Historical Provider, MD   thyroid (ARMOUR THYROID) 90 MG tablet TAKE ONE TABLET BY MOUTH EVERY OTHER DAY Yes Laurie Brewster, APRN - CNP   MISC NATURAL PRODUCTS PO Take by mouth ostebase (Ca+Vit D +Vit. K+ mg+) Yes Historical Provider, MD   acyclovir (ZOVIRAX) 400 MG tablet Take 1 tablet by mouth daily Yes Vanessa Prakash MD   MISC NATURAL PRODUCTS PO Take 1 capsule by mouth 3 times daily ALPHA BASE CAPS WITHOUT IRON Yes Historical Provider, MD   MISC NATURAL PRODUCTS PO Take 1 tablet by mouth daily 4SIGHT Yes Historical Provider, MD   MISC NATURAL PRODUCTS PO Take 2 tablets by mouth daily COSMEDIX Yes Historical Provider, MD   MISC NATURAL PRODUCTS PO Take 1 tablet by mouth daily ORTHO DIGESTZYME Yes Historical Provider, MD   MISC NATURAL PRODUCTS PO Take 1 tablet by mouth daily garlic Yes Historical Provider, MD   Omega-3 Fatty Acids (FISH OIL) 1200 MG CAPS Take by mouth Yes Historical Provider, MD   Misc Natural Products CAPS Take 1 capsule by mouth daily. ADRENAL CAPS Yes Historical Provider, MD   Vitamin E 100 UNITS TABS Take 1 tablet by mouth daily  Yes Historical Provider, MD   hydrOXYzine (ATARAX) 25 MG tablet Take 1 tablet by mouth every 8 hours as needed for Itching  Patient not taking: Reported on 9/3/2020  Damari Wilson, APRN - CNP        Social History     Tobacco Use    Smoking status: Never Smoker    Smokeless tobacco: Never Used   Substance Use Topics    Alcohol use:  Yes     Alcohol/week: 1.0 standard drinks     Types: 1 Glasses of wine per week     Comment: daily        Vitals:    09/03/20 1403   BP: 126/64   Site: Left Upper Arm   Position: Sitting   Cuff Size: Medium Adult   Pulse: 78   Temp: 97.8 °F (36.6 °C)   TempSrc: Oral   SpO2: 99%   Weight: 122 lb (55.3 kg)  Comment: SHOES ON   Height: 5' 3\" (1.6 m)     Estimated body mass index is 21.61 kg/m² as calculated from the following:    Height as of this encounter: 5' 3\" (1.6 m). Weight as of this encounter: 122 lb (55.3 kg). Physical Exam  Constitutional:       Appearance: Normal appearance. She is normal weight. Cardiovascular:      Pulses: Normal pulses. Heart sounds: Normal heart sounds. Pulmonary:      Effort: Pulmonary effort is normal.      Breath sounds: Normal breath sounds. Skin:     General: Skin is warm and dry. Neurological:      Mental Status: She is alert and oriented to person, place, and time. Psychiatric:         Mood and Affect: Mood normal.         Behavior: Behavior normal.         Thought Content: Thought content normal.         Judgment: Judgment normal.       Urine dipstick shows negative for all components. ASSESSMENT/PLAN:  1. Anxiety  - Screenings showed moderate anxiety. Discussed treatment options including cognitive therapy and depression medication. Patient would prefer not to start medication at this time. Will follow-up with therapist. Re-evaluate in 1 month for success or need for medication. 2. Depression, unspecified depression type  - Screenings showed moderate depression. Discussed treatment options for this as well. Patient again would prefer to try cognitive therapy with the therapist first. Follow-up in one month. 3. Overactive bladder  - Discussed options, including medication. Patient would like to work on avoiding beverages at night and other triggers to try to manage. Given previous involvement with urologist, if patient feels symptoms aren't improving, will refer to uro gyn for further evaluation. 4. UTI symptoms  - Urine dipstick negative for UTI. No additional treatment indicated.       Return in about 1 month (around 10/3/2020) for Anxiety/Depression. An electronic signature was used to authenticate this note.     --Sarah Yanez, GRISELDA - CNP on 9/3/2020 at 2:57 PM

## 2020-09-03 NOTE — TELEPHONE ENCOUNTER
PT IS COMING IN AT 2 TODAY WITH KENNETH HARRIS. WE DO NOT HAVE ANOTHER DO OR MD COVERING FOR US. AND I CANNOT SCHED HER WITH DR. Pamella Giordano IN PERSON UNTIL October. SHE IS OKAY SEEING DEEDEE Camacho

## 2020-09-03 NOTE — TELEPHONE ENCOUNTER
Okay to send patient to any primary care physician inside the [facility] who is cross covering for the [facility] office. If there is no room in my schedule I cannot see her.

## 2020-10-07 ENCOUNTER — OFFICE VISIT (OUTPATIENT)
Dept: FAMILY MEDICINE CLINIC | Age: 75
End: 2020-10-07
Payer: MEDICARE

## 2020-10-07 VITALS
DIASTOLIC BLOOD PRESSURE: 68 MMHG | SYSTOLIC BLOOD PRESSURE: 118 MMHG | HEART RATE: 78 BPM | TEMPERATURE: 98.2 F | WEIGHT: 120.4 LBS | OXYGEN SATURATION: 99 % | HEIGHT: 63 IN | BODY MASS INDEX: 21.33 KG/M2

## 2020-10-07 PROCEDURE — 90653 IIV ADJUVANT VACCINE IM: CPT | Performed by: NURSE PRACTITIONER

## 2020-10-07 PROCEDURE — 99214 OFFICE O/P EST MOD 30 MIN: CPT | Performed by: NURSE PRACTITIONER

## 2020-10-07 PROCEDURE — G0008 ADMIN INFLUENZA VIRUS VAC: HCPCS | Performed by: NURSE PRACTITIONER

## 2020-10-07 RX ORDER — ACYCLOVIR 400 MG/1
400 TABLET ORAL DAILY
Qty: 90 TABLET | Refills: 3 | Status: SHIPPED | OUTPATIENT
Start: 2020-10-07 | End: 2021-07-28 | Stop reason: SDUPTHER

## 2020-10-07 ASSESSMENT — ENCOUNTER SYMPTOMS
EYE PAIN: 0
SHORTNESS OF BREATH: 0
WHEEZING: 0
SINUS PRESSURE: 0
NAUSEA: 0
COUGH: 0
ABDOMINAL DISTENTION: 0
DIARRHEA: 0
SINUS PAIN: 0
ABDOMINAL PAIN: 0
SORE THROAT: 0
EYE REDNESS: 0
EYE DISCHARGE: 0
VOMITING: 0

## 2020-10-07 NOTE — PROGRESS NOTES
Immunizations Administered     Name Date Dose Route    Influenza, Triv, inactivated, subunit, adjuvanted, IM (Fluad 65 yrs and older) 10/7/2020 0.5 mL Intramuscular    Site: Deltoid- Left    Lot: [id number]    NDC: 66567-091-44          Vaccine Information Sheet, \"Influenza - Inactivated\"  given to Gela Alexander, or parent/legal guardian of  Gela Alexander and verbalized understanding. Patient responses:    Have you ever had a reaction to a flu vaccine? No  Do you have any current illness? No  Have you ever had Guillian Birchdale Syndrome? No  Do you have a serious allergy to any of the follow: Neomycin, Polymyxin, Thimerosal, eggs or egg products? No    Flu vaccine given per order. Please see immunization tab. Risks and benefits explained. Current VIS given.       Immunizations Administered     Name Date Dose Route    Influenza, Triv, inactivated, subunit, adjuvanted, IM (Fluad 65 yrs and older) 10/7/2020 0.5 mL Intramuscular    Site: Deltoid- Left    Lot: [id number]    SofiaGary Vanita 47: [id number]

## 2020-10-07 NOTE — PROGRESS NOTES
10/7/2020     Lillian Cassidy (:  1945) is a 67 y.o. female, here for evaluation of the following medical concerns:    KEERTHI  Vandana Olmstead is here for anxiety follow-up. She states that since the previous visit she has seen the therapist 3 times. She is trying to improve the mice situation, but has not found a solution at this time. She has tried other exterminators without improvement. She feels that the therapy helps a little, but she continues to have some anxiety. She continues to want to treat conservatively and avoid medication. Review of Systems   Constitutional: Negative for chills and fever. HENT: Negative for ear discharge, ear pain, hearing loss, sinus pressure, sinus pain and sore throat. Eyes: Negative for pain, discharge and redness. Respiratory: Negative for cough, shortness of breath and wheezing. Cardiovascular: Negative for chest pain and palpitations. Gastrointestinal: Negative for abdominal distention, abdominal pain, diarrhea, nausea and vomiting. Genitourinary: Negative for dysuria and hematuria. Musculoskeletal: Negative for myalgias. Skin: Negative for rash. Neurological: Negative for weakness, numbness and headaches. Psychiatric/Behavioral: Positive for dysphoric mood. Negative for agitation, confusion, decreased concentration, self-injury, sleep disturbance and suicidal ideas. The patient is nervous/anxious. Prior to Visit Medications    Medication Sig Taking? Authorizing Provider   acyclovir (ZOVIRAX) 400 MG tablet Take 1 tablet by mouth daily Yes Silverio Streeter APRN - CNP   Red Yeast Rice Extract (RED YEAST RICE PO) Take by mouth Yes Historical Provider, MD   NONFORMULARY OSTEO BASE Yes Historical Provider, MD   thyroid (LARA THYROID) 90 MG tablet TAKE ONE TABLET BY MOUTH EVERY OTHER DAY Yes Laurie Paz APRN - CNP   MISC NATURAL PRODUCTS PO Take by mouth ostebase (Ca+Vit D +Vit.  K+ mg+) Yes Historical Provider, MD   MISC NATURAL PRODUCTS PO Bone Marrow Biopsy Procedure Note  Patient's Name: El Ace  Date of Procedure: 3/13/19     PROCEDURE:  Unilateral bone marrow biopsy and unilateral aspirate      INDICATION:  Evaluation for concern of acute leukemia        PERFORMED BY:  Vee Cabrera PA-C     CONSENT:  Informed consent was obtained from the patient. The risks and benefits of the procedure were explained. The patient agreed to undergo the procedure. The consent form was signed and placed in the paper chart.      PREMEDICATION:  Versed 2mg IV x once     PROCEDURE SUMMARY:  The patient's identification was positively verified by ID band. Patient was laid in the prone position. Premedication with Versed 2mg IV x once was given. The LEFT posterior iliac crest (LPIC) was prepped and draped in a sterile manner. The skin, deeper tissues, and periosteum of the LPIC were anesthetized with approximately 20 mL 1% lidocaine. Following this, a 3mm incision was made. The trephine needle was advanced into the LPIC bone cavity and a 20 mm core biopsy was obtained. Next, bone marrow aspirates were obtained from the LPIC; approximately 25 ml of marrow were aspirated. Following the procedure, a sterile pressure dressing was applied to the bone marrow biopsy site.      COMPLICATIONS:  None. The patient tolerated the procedure very well with minimal discomfort.      RECOMMENDATIONS:  The patient was placed in the supine position to maintain pressure on the biopsy site.   The patient was instructed to lie flat for 45-60 minutes and not to remove the dressing or get it wet for 24 hours post-procedure.      TESTS ORDERED:  Morphology  Flow cytometry  Chromosomes  FISH   Next Generation Sequencing molecular panel      Vee Cabrera PA-C  Hematology/Oncology  Pager: [phone]       Take 1 capsule by mouth 3 times daily ALPHA BASE CAPS WITHOUT IRON Yes Historical Provider, MD   MISC NATURAL PRODUCTS PO Take 1 tablet by mouth daily 4SIGHT Yes Historical Provider, MD   MISC NATURAL PRODUCTS PO Take 2 tablets by mouth daily COSMEDIX Yes Historical Provider, MD   MISC NATURAL PRODUCTS PO Take 1 tablet by mouth daily ORTHO DIGESTZYME Yes Historical Provider, MD   MISC NATURAL PRODUCTS PO Take 1 tablet by mouth daily garlic Yes Historical Provider, MD   Omega-3 Fatty Acids (FISH OIL) 1200 MG CAPS Take by mouth Yes Historical Provider, MD   Misc Natural Products CAPS Take 1 capsule by mouth daily. ADRENAL CAPS Yes Historical Provider, MD   Vitamin E 100 UNITS TABS Take 1 tablet by mouth daily  Yes Historical Provider, MD        Social History     Tobacco Use    Smoking status: Never Smoker    Smokeless tobacco: Never Used   Substance Use Topics    Alcohol use: Yes     Alcohol/week: 1.0 standard drinks     Types: 1 Glasses of wine per week     Comment: daily        Vitals:    10/07/20 1321   BP: 118/68   Site: Left Upper Arm   Position: Sitting   Cuff Size: Large Adult   Pulse: 78   Temp: 98.2 °F (36.8 °C)   TempSrc: Tympanic   SpO2: 99%   Weight: 120 lb 6.4 oz (54.6 kg)  Comment: SHOES ON   Height: 5' 3\" (1.6 m)     Estimated body mass index is 21.33 kg/m² as calculated from the following:    Height as of this encounter: 5' 3\" (1.6 m). Weight as of this encounter: 120 lb 6.4 oz (54.6 kg). Physical Exam  Constitutional:       Appearance: Normal appearance. She is normal weight. HENT:      Head: Normocephalic and atraumatic. Right Ear: Tympanic membrane, ear canal and external ear normal.      Left Ear: Tympanic membrane, ear canal and external ear normal.      Mouth/Throat:      Mouth: Mucous membranes are moist.   Eyes:      Extraocular Movements: Extraocular movements intact. Conjunctiva/sclera: Conjunctivae normal.      Pupils: Pupils are equal, round, and reactive to light. Neck:      Musculoskeletal: Normal range of motion and neck supple. Thyroid: No thyromegaly. Cardiovascular:      Rate and Rhythm: Normal rate and regular rhythm. Pulmonary:      Effort: Pulmonary effort is normal.      Breath sounds: Normal breath sounds. Abdominal:      General: Bowel sounds are normal.      Palpations: Abdomen is soft. Tenderness: There is no abdominal tenderness. There is no guarding or rebound. Musculoskeletal: Normal range of motion. Lymphadenopathy:      Cervical: No cervical adenopathy. Skin:     General: Skin is warm and dry. Capillary Refill: Capillary refill takes less than 2 seconds. Neurological:      Mental Status: She is alert and oriented to person, place, and time. Psychiatric:         Mood and Affect: Mood normal.         Behavior: Behavior normal.         Thought Content: Thought content normal.         Judgment: Judgment normal.      Comments: Appears anxious         ASSESSMENT/PLAN:  1. Anxiety  - Again discussed options for anxiety. She declines medication at this time. Continue behavioral/cognitive therapy with therapist. Educated to follow-up if she feels she needs medication. Would be appropriate for SSRI. 2. Need for influenza vaccination  - INFLUENZA, TRIV, INACTIVATED, SUBUNIT, ADJUVANTED, 65 YRS AND OLDER, IM, PREFILL SYR, 0.5ML (FLUAD TRIV)      Return in about 3 months (around 1/7/2021) for Follow-up Thyroid. An electronic signature was used to authenticate this note.     --GRISELDA Fagan - CNP on 10/7/2020 at 3:25 PM

## 2020-10-08 ENCOUNTER — TELEPHONE (OUTPATIENT)
Dept: FAMILY MEDICINE CLINIC | Age: 75
End: 2020-10-08

## 2020-11-18 ENCOUNTER — TELEPHONE (OUTPATIENT)
Dept: FAMILY MEDICINE CLINIC | Age: 75
End: 2020-11-18

## 2020-11-18 NOTE — TELEPHONE ENCOUNTER
PT @  [phone]      SHE KEEPS GETTING RED SPOTS ON ARMS, BACK AND LEGS - THEN JUST GOES AWAY - AND COMES BACK THE SAME DAY IN A DIFFERENT SPOT -  KEEPS HAPPENING - SHOULD SHE SEE A DERMATOLOGIST OR WHAT SHOULD SHE DO?

## 2020-11-23 ENCOUNTER — HOSPITAL ENCOUNTER (OUTPATIENT)
Age: 75
Discharge: HOME | End: 2020-11-23
Payer: MEDICARE

## 2020-11-23 LAB
ALBUMIN SERPL-MCNC: 4.2 G/DL (ref 3.4–5)
ALP BLD-CCNC: 97 U/L (ref 40–129)
ALT SERPL-CCNC: 17 U/L (ref 10–40)
ANION GAP SERPL CALCULATED.3IONS-SCNC: 11 MMOL/L (ref 3–16)
AST SERPL-CCNC: 20 U/L (ref 15–37)
BASOPHILS ABSOLUTE: 0 K/UL (ref 0–0.2)
BASOPHILS RELATIVE PERCENT: 1 %
BILIRUB SERPL-MCNC: 0.5 MG/DL (ref 0–1)
BILIRUBIN DIRECT: <0.2 MG/DL (ref 0–0.3)
BILIRUBIN, INDIRECT: NORMAL MG/DL (ref 0–1)
BUN BLDV-MCNC: 12 MG/DL (ref 7–20)
CALCIUM SERPL-MCNC: 9.6 MG/DL (ref 8.3–10.6)
CHLORIDE BLD-SCNC: 98 MMOL/L (ref 99–110)
CO2: 24 MMOL/L (ref 21–32)
CREAT SERPL-MCNC: 0.6 MG/DL (ref 0.6–1.2)
EOSINOPHILS ABSOLUTE: 0 K/UL (ref 0–0.6)
EOSINOPHILS RELATIVE PERCENT: 0.9 %
GFR AFRICAN AMERICAN: >60
GFR NON-AFRICAN AMERICAN: >60
GLUCOSE BLD-MCNC: 126 MG/DL (ref 70–99)
HCT VFR BLD CALC: 41.8 % (ref 36–48)
HEMOGLOBIN: 14.1 G/DL (ref 12–16)
LYMPHOCYTES ABSOLUTE: 1.3 K/UL (ref 1–5.1)
LYMPHOCYTES RELATIVE PERCENT: 26.6 %
MCH RBC QN AUTO: 32 PG (ref 26–34)
MCHC RBC AUTO-ENTMCNC: 33.9 G/DL (ref 31–36)
MCV RBC AUTO: 94.4 FL (ref 80–100)
MONOCYTES ABSOLUTE: 0.5 K/UL (ref 0–1.3)
MONOCYTES RELATIVE PERCENT: 9.3 %
NEUTROPHILS ABSOLUTE: 3.1 K/UL (ref 1.7–7.7)
NEUTROPHILS RELATIVE PERCENT: 62.2 %
PDW BLD-RTO: 14 % (ref 12.4–15.4)
PLATELET # BLD: 236 K/UL (ref 135–450)
PMV BLD AUTO: 9.2 FL (ref 5–10.5)
POTASSIUM SERPL-SCNC: 4.4 MMOL/L (ref 3.5–5.1)
RBC # BLD: 4.42 M/UL (ref 4–5.2)
SODIUM BLD-SCNC: 133 MMOL/L (ref 136–145)
T4 FREE: 1.2 NG/DL (ref 0.9–1.8)
TOTAL PROTEIN: 6.9 G/DL (ref 6.4–8.2)
TSH SERPL DL<=0.05 MIU/L-ACNC: 0.88 UIU/ML (ref 0.27–4.2)
WBC # BLD: 4.9 K/UL (ref 4–11)

## 2020-11-23 PROCEDURE — 36415 COLL VENOUS BLD VENIPUNCTURE: CPT

## 2020-11-23 PROCEDURE — 87341 HEP B SURFACE AG NEUTRLZJ IA: CPT

## 2020-11-23 PROCEDURE — 84443 ASSAY THYROID STIM HORMONE: CPT

## 2020-11-23 PROCEDURE — 85025 COMPLETE CBC W/AUTO DIFF WBC: CPT

## 2020-11-23 PROCEDURE — 80076 HEPATIC FUNCTION PANEL: CPT

## 2020-11-23 PROCEDURE — 84439 ASSAY OF FREE THYROXINE: CPT

## 2020-11-23 PROCEDURE — 80074 ACUTE HEPATITIS PANEL: CPT

## 2020-11-23 PROCEDURE — 83550 IRON BINDING TEST: CPT

## 2020-11-23 PROCEDURE — 83540 ASSAY OF IRON: CPT

## 2020-11-23 PROCEDURE — 80048 BASIC METABOLIC PNL TOTAL CA: CPT

## 2020-11-24 LAB
HAV IGM SER IA-ACNC: ABNORMAL
HEPATITIS B CORE IGM ANTIBODY: ABNORMAL
HEPATITIS B SURFACE ANTIGEN INTERPRETATION: REACTIVE
HEPATITIS C ANTIBODY INTERPRETATION: ABNORMAL
IRON SATURATION: 42 % (ref 15–50)
IRON: 138 UG/DL (ref 37–145)
TOTAL IRON BINDING CAPACITY: 326 UG/DL (ref 260–445)

## 2020-11-26 LAB — HEPATITIS B SURFACE ANTIGEN CONFIRMATION: POSITIVE

## 2020-11-30 ENCOUNTER — TELEPHONE (OUTPATIENT)
Dept: FAMILY MEDICINE CLINIC | Age: 75
End: 2020-11-30

## 2020-11-30 RX ORDER — FLUCONAZOLE 150 MG/1
150 TABLET ORAL
Qty: 2 TABLET | Refills: 0 | Status: SHIPPED | OUTPATIENT
Start: 2020-11-30 | End: 2022-05-24 | Stop reason: SDUPTHER

## 2020-11-30 NOTE — TELEPHONE ENCOUNTER
Patient was put on a 14 day course of antibiotics by her Dentist. Asking if we can please call in the [address] for her  Uses Samir Peres

## 2020-12-11 ENCOUNTER — HOSPITAL ENCOUNTER (OUTPATIENT)
Dept: WOMENS IMAGING | Age: 75
Discharge: HOME | End: 2020-12-11
Payer: MEDICARE

## 2020-12-11 PROCEDURE — 77063 BREAST TOMOSYNTHESIS BI: CPT

## 2021-01-04 ENCOUNTER — OFFICE VISIT (OUTPATIENT)
Dept: FAMILY MEDICINE CLINIC | Age: 76
End: 2021-01-04
Payer: MEDICARE

## 2021-01-04 VITALS
BODY MASS INDEX: 21.4 KG/M2 | WEIGHT: 120.8 LBS | DIASTOLIC BLOOD PRESSURE: 76 MMHG | TEMPERATURE: 96.9 F | SYSTOLIC BLOOD PRESSURE: 128 MMHG | HEIGHT: 63 IN | OXYGEN SATURATION: 98 % | HEART RATE: 70 BPM

## 2021-01-04 DIAGNOSIS — E03.9 ACQUIRED HYPOTHYROIDISM: Primary | ICD-10-CM

## 2021-01-04 PROCEDURE — 1123F ACP DISCUSS/DSCN MKR DOCD: CPT | Performed by: NURSE PRACTITIONER

## 2021-01-04 PROCEDURE — G8399 PT W/DXA RESULTS DOCUMENT: HCPCS | Performed by: NURSE PRACTITIONER

## 2021-01-04 PROCEDURE — G8482 FLU IMMUNIZE ORDER/ADMIN: HCPCS | Performed by: NURSE PRACTITIONER

## 2021-01-04 PROCEDURE — 99213 OFFICE O/P EST LOW 20 MIN: CPT | Performed by: NURSE PRACTITIONER

## 2021-01-04 PROCEDURE — 1090F PRES/ABSN URINE INCON ASSESS: CPT | Performed by: NURSE PRACTITIONER

## 2021-01-04 PROCEDURE — G8420 CALC BMI NORM PARAMETERS: HCPCS | Performed by: NURSE PRACTITIONER

## 2021-01-04 PROCEDURE — 1036F TOBACCO NON-USER: CPT | Performed by: NURSE PRACTITIONER

## 2021-01-04 PROCEDURE — G8510 SCR DEP NEG, NO PLAN REQD: HCPCS | Performed by: NURSE PRACTITIONER

## 2021-01-04 PROCEDURE — 4040F PNEUMOC VAC/ADMIN/RCVD: CPT | Performed by: NURSE PRACTITIONER

## 2021-01-04 PROCEDURE — G8427 DOCREV CUR MEDS BY ELIG CLIN: HCPCS | Performed by: NURSE PRACTITIONER

## 2021-01-04 ASSESSMENT — ENCOUNTER SYMPTOMS
SHORTNESS OF BREATH: 0
WHEEZING: 0

## 2021-01-04 ASSESSMENT — PATIENT HEALTH QUESTIONNAIRE - PHQ9
SUM OF ALL RESPONSES TO PHQ QUESTIONS 1-9: 0
SUM OF ALL RESPONSES TO PHQ QUESTIONS 1-9: 0

## 2021-01-04 NOTE — PROGRESS NOTES
Trisha Goddard (:  1945) is a 67 y.o. female,Established patient, here for evaluation of the following chief complaint(s):  Hypothyroidism (ROUTINE THYROID FOLLOW UP WITH FASTING LABS )      ASSESSMENT/PLAN:  1. Acquired hypothyroidism  -Reviewed lab work from dermatologist.  Thyroid therapeutic at this time. No changes required to dose. - Follow-up in July for annual wellness visit. Can repeat lab work at that time. Return in about 7 months (around 2021) for Annual Wellness Visit/Fasting Labs. SUBJECTIVE/OBJECTIVE:  KEERTHI Alejandra is here for thyroid follow-up. States that she is tolerating the medication well. She was seen by dermatology in November, and dermatology did a work-up for rash. At that time they did check her thyroid levels which were found to be normal.  She denies any other complaints today. She states her anxiety is much better because the mice problem has been resolved. Review of Systems   Constitutional: Negative for chills and fever. Respiratory: Negative for shortness of breath and wheezing. Cardiovascular: Negative for chest pain and palpitations. Endocrine: Negative for cold intolerance, heat intolerance, polydipsia, polyphagia and polyuria. Neurological: Negative for dizziness, weakness, light-headedness and headaches. Psychiatric/Behavioral: Negative for decreased concentration and dysphoric mood. The patient is not nervous/anxious. Physical Exam  Constitutional:       General: She is not in acute distress. Appearance: Normal appearance. She is normal weight. Cardiovascular:      Heart sounds: Normal heart sounds. Pulmonary:      Effort: Pulmonary effort is normal.      Breath sounds: Normal breath sounds. Neurological:      Mental Status: She is alert and oriented to person, place, and time. Psychiatric:         Mood and Affect: Mood normal.         Behavior: Behavior normal.         Thought Content:  Thought content normal. Judgment: Judgment normal.           An electronic signature was used to authenticate this note.     --Alice Oro, GRISELDA - CNP

## 2021-01-06 LAB
CANDIDA SPECIES, DNA PROBE: NORMAL
GARDNERELLA VAGINALIS, DNA PROBE: NORMAL
TRICHOMONAS VAGINALIS DNA: NORMAL

## 2021-01-13 ENCOUNTER — OFFICE VISIT (OUTPATIENT)
Dept: ENT CLINIC | Age: 76
End: 2021-01-13
Payer: MEDICARE

## 2021-01-13 VITALS
BODY MASS INDEX: 21.26 KG/M2 | TEMPERATURE: 97.5 F | RESPIRATION RATE: 16 BRPM | SYSTOLIC BLOOD PRESSURE: 134 MMHG | HEIGHT: 63 IN | WEIGHT: 120 LBS | HEART RATE: 92 BPM | DIASTOLIC BLOOD PRESSURE: 80 MMHG

## 2021-01-13 DIAGNOSIS — H61.23 BILATERAL IMPACTED CERUMEN: Primary | ICD-10-CM

## 2021-01-13 PROCEDURE — 69210 REMOVE IMPACTED EAR WAX UNI: CPT | Performed by: OTOLARYNGOLOGY

## 2021-01-13 NOTE — PROGRESS NOTES
Chief Complaint   Patient presents with    Cerumen Impaction       HISTORY:    Massiel Miramontes stated that both ears are plugged up with ear wax. She typically gets her ears cleaned out every 6 months. EXAMINATION:    Both external ear canals were partially occluded by cerumen impaction, obscuring visualization of the TMs. PROCEDURE - REMOVAL OF BILATERAL CERUMEN IMPACTION:   The cerumen impaction was removed from both of the EACs, under otomicroscopy visualization, with instrumentation, using a Billeau wire loop. After successful cerumen removal, the EACs appeared to be normal and clear bilaterally without mass, exudate, or edema. The tympanic membranes appeared to be normal.  There was no evidence of acute disease. IMPRESSION / Erasmo Sinclair / Yanci Solo / PROCEDURES:       Massiel Miramontes was seen today for cerumen impaction. Diagnoses and all orders for this visit:    Bilateral impacted cerumen         RECOMMENDATIONS / PLAN:     Return for recurrence of excessive earwax symptoms, or any further ear nose throat or sinus problems.

## 2021-02-05 ENCOUNTER — IMMUNIZATION (OUTPATIENT)
Dept: PRIMARY CARE CLINIC | Age: 76
End: 2021-02-05
Payer: MEDICARE

## 2021-02-05 PROCEDURE — 0011A COVID-19, MODERNA VACCINE 100MCG/0.5ML DOSE: CPT | Performed by: FAMILY MEDICINE

## 2021-02-05 PROCEDURE — 91301 COVID-19, MODERNA VACCINE 100MCG/0.5ML DOSE: CPT | Performed by: FAMILY MEDICINE

## 2021-04-01 ENCOUNTER — TELEPHONE (OUTPATIENT)
Dept: FAMILY MEDICINE CLINIC | Age: 76
End: 2021-04-01

## 2021-04-01 NOTE — TELEPHONE ENCOUNTER
DISCUSSED WITH KENNETH AND IF SHE IS ASKING ABOUT COLON POLYPS NOT THAT WE ARE AWARE OF. I LEFT A DETAILED MESSAGE. K

## 2021-04-29 ENCOUNTER — HOSPITAL ENCOUNTER (OUTPATIENT)
Dept: PHYSICAL THERAPY | Age: 76
Setting detail: THERAPIES SERIES
Discharge: HOME | End: 2021-04-29
Payer: MEDICARE

## 2021-04-29 PROCEDURE — 97161 PT EVAL LOW COMPLEX 20 MIN: CPT

## 2021-04-29 PROCEDURE — 97530 THERAPEUTIC ACTIVITIES: CPT

## 2021-04-29 PROCEDURE — 97110 THERAPEUTIC EXERCISES: CPT

## 2021-04-29 NOTE — PLAN OF CARE
[address] [address], [address]  Phone: [phone]   Fax: [phone]     Physical Therapy Certification    Dear Referring Practitioner: Cyndie Choi,    We had the pleasure of evaluating the following patient for physical therapy services at [address] [place] [address]. A summary of our findings can be found in the initial assessment below. This includes our plan of care. If you have any questions or concerns regarding these findings, please do not hesitate to contact me at the office phone number checked above. Thank you for the referral.       Physician Signature:_______________________________Date:__________________  By signing above (or electronic signature), therapists plan is approved by physician      Patient: Hebert Hoskins   : 1945   MRN: [id number]  Referring Physician: Referring Practitioner: Cyndie Choi      Evaluation Date: 2021      Medical Diagnosis Information:  Diagnosis: Chronic low back pain M54.5, G89.29   Treatment Diagnosis: Impaired lumbar mobility, decreased core/hip strength                                         Insurance information: PT Insurance Information: Medicare      Precautions/ Contra-indications:   Latex Allergy:  [x]NO      []YES  Preferred Language for Healthcare:   [x]English       []Other:    C-SSRS Triggered by Intake questionnaire (Past 2 wk assessment ):   [x] No, Questionnaire did not trigger screening.   [] Yes, Patient intake triggered C-SSRS Screening     [] Completed, no further action required. [] Completed, PCP notified via Epic    SUBJECTIVE: Patient stated complaint: Pt referred for back pain from [facility].  Reports she has been having steroid shots for a period of 3 years prior to her THR which they say may have aggravated it. Hasn't been too active with exercise over the past year due to COVID, but has been walking in the park a few times/week. Was previously going to the [facility] because insurance was paying for it, but then was afraid to go in because she saw people weren't wearing masks. Reports the back pain has been bothering her for around a year since she hasn't been exercising. Says the pain is off and on. More when getting up in the morning, sore. Bothers her when sitting too long. Worse with sleeping. No relief with any specific position other than getting up and moving around. Patient has pain if standing for an hour. Can walk for 1.5 miles. Still has some pain into her right hip (THR) at times. Pain goes away with more movement. Said she was told she has osteoporosis. Fear avoidance: I should not do physical activities that (might) make my pain worse   [] True   [x] False     Relevant Medical History: Hypothyroidism, Depression, UTI, R THR with PT 5/21/2019     Functional Outcome: Oswestry: raw score = 5; dysfunction = 14.2 %    Pain Scale: 2-4/10  Easing factors: movement  Provocative factors:  Rest      Type: []Constant   [x]Intermittent  []Radiating [x]Localized []other:     Numbness/Tingling: denies any N/T    Occupation/School: retired     Living Status/Prior Level of Function: Prior to this injury / incident, pt was independent with ADLs and IADLs, lives alone in a single story home. OBJECTIVE:   Palpation:  TTP L2-L5, hypomobility     Functional Mobility/Transfers:     Posture:      Inspection: rounded shoulders, decreased anterior/posterior pelvic mobility, decreased disassociation    Gait: (include devices/WB status)     Bandages/Dressings/Incisions: NA    Dermatomes Normal Abnormal Comments   inguinal area (L1)  x     anterior mid-thigh (L2) x     distal ant thigh/med knee (L3) x     medial lower leg and foot (L4) x     lateral lower leg and foot (L5) x     posterior calf (S1) x     medial calcaneus (S2) x         Reflexes Normal Abnormal Comments   S1-2 Seated achilles      S1-2 Prone knee bend      L3-4 Patellar tendon x Clonus      Babinski          ROM  Comments   Lumbar Flex WFL No pain with OP- relief    Lumbar Ext WFL No Pain with OP- mild increase in pain     ROM LEFT RIGHT Comments   Lumbar Side Bend [facility] WFL    Lumbar Rotation [facility] WF    Hip Flexion >120* > 120     Hip Abd      Hip ER      Hip IR 25 25    Hip Extension      Knee Ext      Knee Flex      Hamstring Flex Lacking 15* Lacking 15*    Piriformis WNL WNL                    Joint mobility:    []Normal    [x]Hypo   []Hyper      Strength / Myotomes LEFT RIGHT Comments   Multifidus      Transverse Ab 4-     Hip Flexors (L1-2) 4+ 4+    Hip Abductors 4 4    Hip Extensors      Hip Internal Rotators      Hip External Rotators      Quads (L2-4) 5 5    Hamstrings  5 5    Ankle Plantarflexion (S1-2)      Ankle Dorsiflexion (L4-5) 5 5    Ankle Inversion      Ankle Eversion (S1-2)      Great Toe Extension (L5)              Neural dynamic tension testing Normal Abnormal Comments   Slump Test  - Degree of knee flexion:  x     SLR       0-30 x     30-70 x     Femoral nerve (L2-4) x         Orthopedic Special Tests:    Normal Abnormal N/A Comments   Toe walk   x      Heel Walk x      Fwd Bend-aberrant or innominate mvmt)       Trendelenburg       Kemps/Quadrant       Stork       ISADORA/Jayesh       Hip scour       SLR x      Crossed SLR x      Supine to sit       Hip thrust x      SI distraction/compression x      PA/Spring x      Prone Instability test x      Prone knee bend x      Sacral Spring/thrust                  [x] Patient history, allergies, meds reviewed. Medical chart reviewed. See intake form. Review Of Systems (ROS):  [x]Performed Review of systems (Integumentary, CardioPulmonary, Neurological) by intake and observation. Intake form has been scanned into medical record. Patient has been instructed to contact their primary care physician regarding ROS issues if not already being addressed at this time.       Co-morbidities/Complexities (which will affect course of rehabilitation):   []None        []Hx of COVID   Arthritic conditions   []Rheumatoid arthritis (M05.9)  [x]Osteoarthritis (M19.91)  []Gout   Cardiovascular conditions   []Hypertension (I10)  []Hyperlipidemia (E78.5)  []Angina pectoris (I20)  []Atherosclerosis (I70)  []Pacemaker  []Hx of CABG/stent/  cardiac surgeries   Musculoskeletal conditions   []Disc pathology   []Congenital spine pathologies   []Osteoporosis (M81.8)  []Osteopenia (M85.8)  []Scoliosis       Endocrine conditions   []Hypothyroid (E03.9)  []Hyperthyroid Gastrointestinal conditions   []Constipation (D55.63)   Metabolic conditions   []Morbid obesity (E66.01)  []Diabetes type 1(E10.65) or 2 (E11.65)   []Neuropathy (G60.9)     Cardio/Pulmonary conditions   []Asthma (J45)  []Coughing   []COPD (J44.9)  []CHF  []A-fib   Psychological Disorders  []Anxiety (F41.9)  []Depression (F32.9)   []Other:   Developmental Disorders  []Autism (F84.0)  []CP (G80)  []Down Syndrome (Q90.9)  []Developmental delay     Neurological conditions  []Prior Stroke (I69.30)  []Parkinson's (G20)  []Encephalopathy (G93.40)  []MS (G35)  []Post-polio (G14)  []SCI  []TBI  []ALS Other conditions  []Fibromyalgia (M79.7)  []Vertigo  []Syncope  []Kidney Failure  []Cancer      []currently undergoing                treatment  []Pregnancy  []Incontinence   Prior surgeries  []involved limb  []previous spinal surgery  [] section birth  []hysterectomy  []bowel / bladder surgery  []other relevant surgeries   []Other:              Barriers to/and or personal factors that will affect rehab potential:              [x]Age  []Sex    []Smoker              []Motivation/Lack of Motivation                        []Co-Morbidities              []Cognitive Function, education/learning barriers              []Environmental, home barriers              []profession/work barriers  []past PT/medical experience  []other:  Justification:     Falls Risk Assessment (30 days):   [x] Falls Risk assessed and no intervention required. [] Falls Risk assessed and Patient requires intervention due to being higher risk   TUG score (>12s at risk):     [] Falls education provided, including         ASSESSMENT:   Functional Impairments:     [x]Noted lumbar/proximal hip hypomobility   []Noted lumbosacral and/or generalized hypermobility   []Decreased Lumbosacral/hip/LE functional ROM   []Decreased core/proximal hip strength and neuromuscular control    [x]Decreased LE functional strength    []Abnormal reflexes/sensation/myotomal/dermatomal deficits  []Reduced balance/proprioceptive control    []other:      Functional Activity Limitations (from functional questionnaire and intake)   []Reduced ability to tolerate prolonged functional positions   []Reduced ability or difficulty with changes of positions or transfers between positions   [x]Reduced ability to maintain good posture and demonstrate good body mechanics with sitting, bending, and lifting   []Reduced ability to sleep   [] Reduced ability or tolerance with driving and/or computer work   []Reduced ability to perform lifting, reaching, carrying tasks   []Reduced ability to squat   []Reduced ability to forward bend   [x]Reduced ability to ambulate prolonged functional periods/distances/surfaces   []Reduced ability to ascend/descend stairs   []other:       Participation Restrictions   [x]Reduced participation in self care activities   [x]Reduced participation in home management activities   []Reduced participation in work activities   [x]Reduced participation in social activities. []Reduced participation in sport/recreational activities. Classification:   [x]Signs/symptoms consistent with Lumbar instability/stabilization subgroup. [x]Signs/symptoms consistent with Lumbar mobilization/manipulation subgroup, myotomes and dermatomes intact. Meets manipulation criteria.     []Signs/symptoms consistent with Lumbar direction specific/centralization subgroup   []Signs/symptoms HEP    Frequency/Duration:  2 days per week for  4 Weeks:  Interventions:  [x]  Therapeutic exercise including: strength training, ROM, for LE, Glutes and core   [x]  NMR activation and proprioception for glutes , LE and Core   [x]  Manual therapy as indicated for Hip complex, LE and spine to include: Dry Needling/IASTM, STM, PROM, Gr I-IV mobilizations, manipulation. [x]  Modalities as needed that may include: thermal agents, E-stim, Biofeedback, US, iontophoresis as indicated  [x]  Patient education on joint protection, postural re-education, activity modification, progression of HEP. HEP instruction: Written HEP instructions provided and reviewed:    Access Code: [email or web address]  URL: [email or web address]  Date: 04/29/2021  Prepared by: Brock Smyth     Exercises  Supine Single Knee to Chest - 2-3 x daily - 7 x weekly - 1 sets - 5 reps - 10 secs hold  Bridge - 2 x daily - 7 x weekly - 10 reps - 2-3 sets  Clamshell with Resistance - 2 x daily - 7 x weekly - 10 reps - 2-3 sets  Seated Hamstring Stretch - 2-3 x daily - 7 x weekly - 1 sets - 3 reps - 20 secs hold  Quadruped Rock Back into [employer] Up - 2-3 x daily - 7 x weekly - 1 sets - 10 reps       GOALS:  Patient stated goal: get stronger and get better  [] Progressing: [] Met: [] Not Met: [] Adjusted    Therapist goals for Patient:  Get stronger and get better   Short Term Goals: To be achieved in: 2 weeks  1. Independent in HEP and progression per patient tolerance, in order to prevent re-injury. [] Progressing: [] Met: [] Not Met: [] Adjusted  2. Patient will have a decrease in pain to facilitate improvement in movement, function, and ADLs as indicated by Functional Deficits. [] Progressing: [] Met: [] Not Met: [] Adjusted    Long Term Goals: To be achieved in: 4 weeks  1. Disability index score of 10% or less for the MIKAELA to assist with reaching prior level of function. [] Progressing: [] Met: [] Not Met: [] Adjusted  2.  Patient will demonstrate increased AROM to WNL lumbar spine, good LS mobility, good hip ROM to allow for proper joint functioning as indicated by patients Functional Deficits. [] Progressing: [] Met: [] Not Met: [] Adjusted  3. Patient will demonstrate an increase in Strength to 4+/5 bilateral hips with good proximal hip and core activation to allow for proper functional mobility as indicated by patients Functional Deficits. [] Progressing: [] Met: [] Not Met: [] Adjusted  4. Patient will return to functional activities including sleeping through the night without increased symptoms or restriction. [] Progressing: [] Met: [] Not Met: [] Adjusted  5.  Pt will be able to walk for 1.5 miles without increased symptoms or restriction in order to resume PLOF for community walks  [] Progressing: [] Met: [] Not Met: [] Adjusted     Electronically signed by:  Asher Gomez PT

## 2021-04-29 NOTE — FLOWSHEET NOTE
cervical, scapular, scapulothoracic and UE control with self care, reaching, carrying, lifting, house/yardwork, driving, computer work. NMR and Therapeutic Activities:    [] (38893 or 61056) Provided verbal/tactile cueing for activities related to improving balance, coordination, kinesthetic sense, posture, motor skill, proprioception and motor activation to allow for proper function of   [] LE: / Lumbar core, proximal hip and LE with self care and ADLs  [] UE / Cervical: cervical, postural, scapular, scapulothoracic and UE control with self care, carrying, lifting, driving, computer work.   [] (73374) Gait Re-education- Provided training and instruction to the patient for proper LE, core and proximal hip recruitment and positioning and eccentric body weight control with ambulation re-education including up and down stairs     Home Management Training / Self Care:  [] (31349) Provided self-care/home management training related to activities of daily living and compensatory training, and/or use of adaptive equipment for improvement with: ADLs and compensatory training, meal preparation, safety procedures and instruction in use of adaptive equipment, including bathing, grooming, dressing, personal hygiene, basic household cleaning and chores.      Home Exercise Program:    [x] (98541) Reviewed/Progressed HEP activities related to strengthening, flexibility, endurance, ROM of   [] LE / Lumbar: core, proximal hip and LE for functional self-care, mobility, lifting and ambulation/stair navigation   [] UE / Cervical: cervical, postural, scapular, scapulothoracic and UE control with self care, reaching, carrying, lifting, house/yardwork, driving, computer work  [] (25417)Reviewed/Progressed HEP activities related to improving balance, coordination, kinesthetic sense, posture, motor skill, proprioception of   [] LE: core, proximal hip and LE for self care, mobility, lifting, and ambulation/stair navigation    [] UE / Cervical: cervical, postural,  scapular, scapulothoracic and UE control with self care, reaching, carrying, lifting, house/yardwork, driving, computer work    Manual Treatments:  PROM / STM / Oscillations-Mobs:  G-I, II, III, IV (PA's, Inf., Post.)  [] (74440) Provided manual therapy to mobilize LE, proximal hip and/or LS spine soft tissue/joints for the purpose of modulating pain, promoting relaxation,  increasing ROM, reducing/eliminating soft tissue swelling/inflammation/restriction, improving soft tissue extensibility and allowing for proper ROM for normal function with   [] LE / lumbar: self care, mobility, lifting and ambulation. [] UE / Cervical: self care, reaching, carrying, lifting, house/yardwork, driving, computer work. Modalities:  [] (24938) Vasopneumatic compression: Utilized vasopneumatic compression to decrease edema / swelling for the purpose of improving mobility and quad tone / recruitment which will allow for increased overall function including but not limited to self-care, transfers, ambulation, and ascending / descending stairs. Charges:  Timed Code Treatment Minutes: 18   Total Treatment Minutes: 42     [x] EVAL - LOW (65133)   [] EVAL - MOD (61258)  [] EVAL - HIGH (04427)  [] RE-EVAL (51023)  [x] XG(86307) x 1       [] Ionto  [] NMR (04542) x       [] Vaso  [] Manual (54601) x       [] Ultrasound  [x] TA x   1     [] Mech Traction (34654)  [] Aquatic Therapy x     [] ES (un) (48397):   [] Home Management Training x  [] ES(attended) (29515)   [] Dry Needling 1-2 muscles (35747):  [] Dry Needling 3+ muscles (634497)  [] Group:      [] Other:     GOALS:  Patient stated goal: get stronger and get better  []? Progressing: []? Met: []? Not Met: []? Adjusted     Therapist goals for Patient:  Get stronger and get better   Short Term Goals: To be achieved in: 2 weeks  1. Independent in HEP and progression per patient tolerance, in order to prevent re-injury. []? Progressing: []?  Met: []? Not Met: []? Adjusted  2. Patient will have a decrease in pain to facilitate improvement in movement, function, and ADLs as indicated by Functional Deficits. []? Progressing: []? Met: []? Not Met: []? Adjusted     Long Term Goals: To be achieved in: 4 weeks  1. Disability index score of 10% or less for the MIKAELA to assist with reaching prior level of function. []? Progressing: []? Met: []? Not Met: []? Adjusted  2. Patient will demonstrate increased AROM to WNL lumbar spine, good LS mobility, good hip ROM to allow for proper joint functioning as indicated by patients Functional Deficits. []? Progressing: []? Met: []? Not Met: []? Adjusted  3. Patient will demonstrate an increase in Strength to 4+/5 bilateral hips with good proximal hip and core activation to allow for proper functional mobility as indicated by patients Functional Deficits. []? Progressing: []? Met: []? Not Met: []? Adjusted  4. Patient will return to functional activities including sleeping through the night without increased symptoms or restriction. []? Progressing: []? Met: []? Not Met: []? Adjusted  5. Pt will be able to walk for 1.5 miles without increased symptoms or restriction in order to resume PLOF for community walks  []? Progressing: []? Met: []? Not Met: []? Adjusted         Overall Progression Towards Functional goals/ Treatment Progress Update:  [] Patient is progressing as expected towards functional goals listed. [] Progression is slowed due to complexities/Impairments listed. [] Progression has been slowed due to co-morbidities.   [x] Plan just implemented, too soon to assess goals progression <30days   [] Goals require adjustment due to lack of progress  [] Patient is not progressing as expected and requires additional follow up with physician  [] Other    Persisting Functional

## 2021-05-04 ENCOUNTER — HOSPITAL ENCOUNTER (OUTPATIENT)
Dept: PHYSICAL THERAPY | Age: 76
Setting detail: THERAPIES SERIES
Discharge: HOME | End: 2021-05-04
Payer: MEDICARE

## 2021-05-04 PROCEDURE — 97110 THERAPEUTIC EXERCISES: CPT

## 2021-05-04 PROCEDURE — 97140 MANUAL THERAPY 1/> REGIONS: CPT

## 2021-05-04 NOTE — FLOWSHEET NOTE
is in constant attendance of 2 or more patients providing skilled therapy interventions, but not providing any significant amount of measurable one-on-one time to either patient, for improvements in  [] LE / lumbar: LE, proximal hip, and core control in self care, mobility, lifting, ambulation and eccentric single leg control. [] UE / cervical: cervical, scapular, scapulothoracic and UE control with self care, reaching, carrying, lifting, house/yardwork, driving, computer work. NMR and Therapeutic Activities:    [] (80755 or 28402) Provided verbal/tactile cueing for activities related to improving balance, coordination, kinesthetic sense, posture, motor skill, proprioception and motor activation to allow for proper function of   [] LE: / Lumbar core, proximal hip and LE with self care and ADLs  [] UE / Cervical: cervical, postural, scapular, scapulothoracic and UE control with self care, carrying, lifting, driving, computer work.   [] (68155) Gait Re-education- Provided training and instruction to the patient for proper LE, core and proximal hip recruitment and positioning and eccentric body weight control with ambulation re-education including up and down stairs     Home Management Training / Self Care:  [] (52579) Provided self-care/home management training related to activities of daily living and compensatory training, and/or use of adaptive equipment for improvement with: ADLs and compensatory training, meal preparation, safety procedures and instruction in use of adaptive equipment, including bathing, grooming, dressing, personal hygiene, basic household cleaning and chores.      Home Exercise Program:    [x] (66022) Reviewed/Progressed HEP activities related to strengthening, flexibility, endurance, ROM of   [] LE / Lumbar: core, proximal hip and LE for functional self-care, mobility, lifting and ambulation/stair navigation   [] UE / Cervical: cervical, postural, scapular, scapulothoracic and UE control progression <30days   [] Goals require adjustment due to lack of progress  [] Patient is not progressing as expected and requires additional follow up with physician  [] Other    Persisting Functional Limitations/Impairments:  []Sleeping [x]Sitting               [x]Standing []Transfers        [x]Walking []Kneeling               []Stairs []Squatting / bending   []ADLs []Reaching  [x]Lifting  []Housework  []Driving []Job related tasks  []Sports/Recreation []Other:        ASSESSMENT:  Pt report no change in symptoms post ball-belt traction, moderate discomfort in the ankle during the traction. Completed all there-ex without complication. Will continue to progress as able. Treatment/Activity Tolerance:  [x] Patient able to complete tx  [] Patient limited by fatigue  [] Patient limited by pain  [] Patient limited by other medical complications  [] Other:     Prognosis: [x] Good [] Fair  [] Poor    Patient Requires Follow-up: [x] Yes  [] No    Plan for next treatment session:  Manual, P/A glides, mobilization as needed, core/hip strength. Progress strength after immobility/sedentary lifestyle from COVID and post THR in 2019. PLAN: See eval. PT 2x / week for 4 weeks. [x] Continue per plan of care [] Alter current plan (see comments)  [] Plan of care initiated [] Hold pending MD visit [] Discharge    Electronically signed by: Isauro Meza PT, DPT    Note: If patient does not return for scheduled/ recommended follow up visits, this note will serve as a discharge from care along with most recent update on progress.

## 2021-05-06 ENCOUNTER — HOSPITAL ENCOUNTER (OUTPATIENT)
Dept: PHYSICAL THERAPY | Age: 76
Setting detail: THERAPIES SERIES
Discharge: HOME | End: 2021-05-06
Payer: MEDICARE

## 2021-05-06 PROCEDURE — 97110 THERAPEUTIC EXERCISES: CPT

## 2021-05-06 PROCEDURE — 97140 MANUAL THERAPY 1/> REGIONS: CPT

## 2021-05-06 NOTE — FLOWSHEET NOTE
[address] [facility]  Phone: [phone]   Fax: [phone]    Physical Therapy Daily Treatment Note  Date:  2021    Patient Name:  Erick Argueta    :  1945  MRN: [id number]  Medical/Treatment Diagnosis Information:  · Diagnosis: Chronic low back pain M54.5, G89.29  · Treatment Diagnosis: Impaired lumbar mobility, decreased core/hip strength  Insurance/Certification information:  PT Insurance Information: Medicare  Physician Information:  Referring Practitioner: Dominik Pineda  Plan of care signed (Y/N): []  Yes [x]  No     Date of Patient follow up with Physician:      Progress Report: []  Yes  [x]  No     Date Range for reporting period:  Beginnin/29   Ending:     Progress report due (10 Rx/or 30 days whichever is less): visit #9 or      Recertification due (POC duration/ or 90 days whichever is less): visit # 9    Visit # Insurance Allowable Auth required? Date Range   3/9 [place] []  Yes  [x]  No      Latex Allergy:  [x]NO      []YES  Preferred Language for Healthcare:   [x]English       []other:    Functional Scale:        Date assessed:  Oswestry: raw score = 5; dysfunction = 14.2%       Pain level:  5/10     SUBJECTIVE:  Pt reports they are feeling good. Back is still bothering them. Pt reports their back is still the same from the last visit. Pt reports exercises at home are helping. Pt reports ball belt traction did not change her symptoms.       OBJECTIVE: See eval      RESTRICTIONS/PRECAUTIONS:  History of R THR, osteoporosis     Exercises/Interventions:     Therapeutic Exercises (86676) Resistance / level Sets/sec Reps Notes   Seated stepper  x5'     IB, HR/TR  2 x 30\" x10 HR    HS Stretch   2 x 30\"     DKTC With overpressure (self pulling on legs) 10\" 5           Prayer to Prone Press up Rocking   3\" 10    PPT Hooklying  10\" 10    PPT with alt Marching        PPT with SKTC   10 B    S/L Clamshells       Bridging Quadruped:  Donkey kick  Fire hydrants     10 B  10 B                         Therapeutic Activities (03734)                     Multifidi Walkout  2 plates 3 B 3 steps 5 presses    Chops high to low, low to high              Neuromuscular Re-ed (07378)       Swiss Ball pelvic tilt- A/P, Side to side, clockwise, CCW  10 each                                        Manual Intervention (94574)       P/A Glides       Mobs/Manip       Ball/belt traction        STM to lumbar paraspinals  x10'                       Modalities:     Pt. Education:  -patient educated on diagnosis, prognosis and expectations for rehab  -all patient questions were answered    Home Exercise Program:    Access Code: [email or web address]  URL: [email or web address]  Date: 04/29/2021  Prepared by: Kanu White      Exercises  Supine Single Knee to Chest - 2-3 x daily - 7 x weekly - 1 sets - 5 reps - 10 secs hold  Bridge - 2 x daily - 7 x weekly - 10 reps - 2-3 sets  Clamshell with Resistance - 2 x daily - 7 x weekly - 10 reps - 2-3 sets  Seated Hamstring Stretch - 2-3 x daily - 7 x weekly - 1 sets - 3 reps - 20 secs hold  Quadruped Rock Back into [employer] Up - 2-3 x daily - 7 x weekly - 1 sets - 10 reps      Therapeutic Exercise and NMR EXR  [] (78088) Provided verbal/tactile cueing for activities related to strengthening, flexibility, endurance, ROM for improvements in  [] LE / Lumbar: LE, proximal hip, and core control with self care, mobility, lifting, ambulation. [] UE / Cervical: cervical, postural, scapular, scapulothoracic and UE control with self care, reaching, carrying, lifting, house/yardwork, driving, computer work.  [] (60717) Provided verbal/tactile cueing for activities related to improving balance, coordination, kinesthetic sense, posture, motor skill, proprioception to assist with   [] LE / lumbar: LE, proximal hip, and core control in self care, mobility, lifting, ambulation and eccentric single leg control.    [] UE / cervical: cervical, scapular, scapulothoracic and UE control with self care, reaching, carrying, lifting, house/yardwork, driving, computer work.   [] (38374) Therapist is in constant attendance of 2 or more patients providing skilled therapy interventions, but not providing any significant amount of measurable one-on-one time to either patient, for improvements in  [] LE / lumbar: LE, proximal hip, and core control in self care, mobility, lifting, ambulation and eccentric single leg control. [] UE / cervical: cervical, scapular, scapulothoracic and UE control with self care, reaching, carrying, lifting, house/yardwork, driving, computer work. NMR and Therapeutic Activities:    [] (11069 or 82265) Provided verbal/tactile cueing for activities related to improving balance, coordination, kinesthetic sense, posture, motor skill, proprioception and motor activation to allow for proper function of   [] LE: / Lumbar core, proximal hip and LE with self care and ADLs  [] UE / Cervical: cervical, postural, scapular, scapulothoracic and UE control with self care, carrying, lifting, driving, computer work.   [] (29855) Gait Re-education- Provided training and instruction to the patient for proper LE, core and proximal hip recruitment and positioning and eccentric body weight control with ambulation re-education including up and down stairs     Home Management Training / Self Care:  [] (85842) Provided self-care/home management training related to activities of daily living and compensatory training, and/or use of adaptive equipment for improvement with: ADLs and compensatory training, meal preparation, safety procedures and instruction in use of adaptive equipment, including bathing, grooming, dressing, personal hygiene, basic household cleaning and chores.      Home Exercise Program:    [x] (33081) Reviewed/Progressed HEP activities related to strengthening, flexibility, endurance, ROM of   [] LE / Lumbar: core, proximal hip and LE for functional self-care, mobility, lifting and ambulation/stair navigation   [] UE / Cervical: cervical, postural, scapular, scapulothoracic and UE control with self care, reaching, carrying, lifting, house/yardwork, driving, computer work  [] (56985)Reviewed/Progressed HEP activities related to improving balance, coordination, kinesthetic sense, posture, motor skill, proprioception of   [] LE: core, proximal hip and LE for self care, mobility, lifting, and ambulation/stair navigation    [] UE / Cervical: cervical, postural,  scapular, scapulothoracic and UE control with self care, reaching, carrying, lifting, house/yardwork, driving, computer work    Manual Treatments:  PROM / STM / Oscillations-Mobs:  G-I, II, III, IV (PA's, Inf., Post.)  [] (15715) Provided manual therapy to mobilize LE, proximal hip and/or LS spine soft tissue/joints for the purpose of modulating pain, promoting relaxation,  increasing ROM, reducing/eliminating soft tissue swelling/inflammation/restriction, improving soft tissue extensibility and allowing for proper ROM for normal function with   [] LE / lumbar: self care, mobility, lifting and ambulation. [] UE / Cervical: self care, reaching, carrying, lifting, house/yardwork, driving, computer work. Modalities:  [] (64126) Vasopneumatic compression: Utilized vasopneumatic compression to decrease edema / swelling for the purpose of improving mobility and quad tone / recruitment which will allow for increased overall function including but not limited to self-care, transfers, ambulation, and ascending / descending stairs.      Charges:  Timed Code Treatment Minutes: 40   Total Treatment Minutes: 40     [] EVAL - LOW (84300)   [] EVAL - MOD (37009)  [] EVAL - HIGH (03452)  [] RE-EVAL (93246)   [x] WZ(29502) x 2       [] Ionto  [] NMR (16602) x       [] Vaso  [x] Manual (62890) x 1      [] Ultrasound  [] TA x   1     [] Mech Traction (19943)  [] Aquatic Therapy x     [] ES (un) (73175):   [] Home Management Training x  [] ES(attended) (16440)   [] Dry Needling 1-2 muscles (04828):  [] Dry Needling 3+ muscles (686020)  [] Group:      [] Other:     GOALS:  Patient stated goal: get stronger and get better  []? Progressing: []? Met: []? Not Met: []? Adjusted     Therapist goals for Patient:  Get stronger and get better   Short Term Goals: To be achieved in: 2 weeks  1. Independent in HEP and progression per patient tolerance, in order to prevent re-injury. []? Progressing: []? Met: []? Not Met: []? Adjusted  2. Patient will have a decrease in pain to facilitate improvement in movement, function, and ADLs as indicated by Functional Deficits. []? Progressing: []? Met: []? Not Met: []? Adjusted     Long Term Goals: To be achieved in: 4 weeks  1. Disability index score of 10% or less for the MIKAELA to assist with reaching prior level of function. []? Progressing: []? Met: []? Not Met: []? Adjusted  2. Patient will demonstrate increased AROM to WNL lumbar spine, good LS mobility, good hip ROM to allow for proper joint functioning as indicated by patients Functional Deficits. []? Progressing: []? Met: []? Not Met: []? Adjusted  3. Patient will demonstrate an increase in Strength to 4+/5 bilateral hips with good proximal hip and core activation to allow for proper functional mobility as indicated by patients Functional Deficits. []? Progressing: []? Met: []? Not Met: []? Adjusted  4. Patient will return to functional activities including sleeping through the night without increased symptoms or restriction. []? Progressing: []? Met: []? Not Met: []? Adjusted  5. Pt will be able to walk for 1.5 miles without increased symptoms or restriction in order to resume PLOF for community walks  []? Progressing: []? Met: []? Not Met: []? Adjusted         Overall Progression Towards Functional goals/ Treatment Progress Update:  [] Patient is progressing as expected towards functional goals listed.     [] Progression is slowed due to complexities/Impairments listed. [] Progression has been slowed due to co-morbidities. [x] Plan just implemented, too soon to assess goals progression <30days   [] Goals require adjustment due to lack of progress  [] Patient is not progressing as expected and requires additional follow up with physician  [] Other    Persisting Functional Limitations/Impairments:  []Sleeping [x]Sitting               [x]Standing []Transfers        [x]Walking []Kneeling               []Stairs []Squatting / bending   []ADLs []Reaching  [x]Lifting  []Housework  []Driving []Job related tasks  []Sports/Recreation []Other:        ASSESSMENT:  Held ball/belt traction as it did not reduce her symptoms last session and progressing to mechanical is a precaution d/t her osteoporosis. Progressed exercises this session and added in STM to the paraspinals. Will monitor tolerance NV. Treatment/Activity Tolerance:  [x] Patient able to complete tx  [] Patient limited by fatigue  [] Patient limited by pain  [] Patient limited by other medical complications  [] Other:     Prognosis: [x] Good [] Fair  [] Poor    Patient Requires Follow-up: [x] Yes  [] No    Plan for next treatment session:  Manual, P/A glides, mobilization as needed, core/hip strength. Progress strength after immobility/sedentary lifestyle from COVID and post THR in 2019. PLAN: See zack. PT 2x / week for 4 weeks. [x] Continue per plan of care [] Alter current plan (see comments)  [] Plan of care initiated [] Hold pending MD visit [] Discharge    Electronically signed by: Johana Haley PT, DPT    Note: If patient does not return for scheduled/ recommended follow up visits, this note will serve as a discharge from care along with most recent update on progress.

## 2021-05-11 ENCOUNTER — HOSPITAL ENCOUNTER (OUTPATIENT)
Dept: PHYSICAL THERAPY | Age: 76
Setting detail: THERAPIES SERIES
Discharge: HOME | End: 2021-05-11
Payer: MEDICARE

## 2021-05-11 PROCEDURE — 97140 MANUAL THERAPY 1/> REGIONS: CPT

## 2021-05-11 PROCEDURE — 97110 THERAPEUTIC EXERCISES: CPT

## 2021-05-11 NOTE — FLOWSHEET NOTE
[address] [facility] Physical Therapy  Phone: [phone]   Fax: [phone]    Physical Therapy Daily Treatment Note  Date:  2021    Patient Name:  Nima Obrien    :  1945  MRN: [id number]  Medical/Treatment Diagnosis Information:  · Diagnosis: Chronic low back pain M54.5, G89.29  · Treatment Diagnosis: Impaired lumbar mobility, decreased core/hip strength  Insurance/Certification information:  PT Insurance Information: Medicare  Physician Information:  Referring Practitioner: Carie Chan  Plan of care signed (Y/N): []  Yes [x]  No     Date of Patient follow up with Physician:      Progress Report: []  Yes  [x]  No     Date Range for reporting period:  Beginnin/29   Ending:     Progress report due (10 Rx/or 30 days whichever is less): visit #9 or      Recertification due (POC duration/ or 90 days whichever is less): visit # 9    Visit # Insurance Allowable Auth required? Date Range    [place] []  Yes  [x]  No      Latex Allergy:  [x]NO      []YES  Preferred Language for Healthcare:   [x]English       []other:    Functional Scale:        Date assessed:  Oswestry: raw score = 5; dysfunction = 14.2%       Pain level:  4/10     SUBJECTIVE: Says the therapy is helping, when getting up in the morning, she is sore.   Back pain not as bad as it was       OBJECTIVE: See eval      RESTRICTIONS/PRECAUTIONS:  History of R THR, osteoporosis     Exercises/Interventions:     Therapeutic Exercises (68812) Resistance / level Sets/sec Reps Notes   Bike  X 5'     IB, HR/TR  2 x 30\" X 10 HR    HS Stretch   2 x 30\"     DKTC With overpressure (self pulling on legs) 10\" 10           Prayer to Prone Press up Rocking   3\" 10  prayer to neutral only due to pt unable to comprehend movement    PPT Hooklying  10\" 10    PPT with alt Marching        PPT with SKTC   10 B    S/L Clamshells [place] 2 10  tenderness over outer R hip lying on R side    Bridging    15 Quadruped:  Donkey kick  Fire hydrants     10 B  10 B                         Therapeutic Activities (15455)                     Multifidi Walkout  2 plates 3 B 3 steps 5 presses    Chops high to low, low to high              Neuromuscular Re-ed (74989)       Swiss Ball pelvic tilt- A/P, Side to side, clockwise, CCW  20 each                                        Manual Intervention (06885)       P/A Glides       Mobs/Manip       Ball/belt traction        STM to lumbar paraspinals  x10'                       Modalities:     Pt. Education:  -patient educated on diagnosis, prognosis and expectations for rehab  -all patient questions were answered    Home Exercise Program:    Access Code: [email or web address]  URL: [email or web address]  Date: 04/29/2021  Prepared by: Gerson Pina      Exercises  Supine Single Knee to Chest - 2-3 x daily - 7 x weekly - 1 sets - 5 reps - 10 secs hold  Bridge - 2 x daily - 7 x weekly - 10 reps - 2-3 sets  Clamshell with Resistance - 2 x daily - 7 x weekly - 10 reps - 2-3 sets  Seated Hamstring Stretch - 2-3 x daily - 7 x weekly - 1 sets - 3 reps - 20 secs hold  Quadruped Rock Back into [employer] Up - 2-3 x daily - 7 x weekly - 1 sets - 10 reps      Therapeutic Exercise and NMR EXR  [] (32621) Provided verbal/tactile cueing for activities related to strengthening, flexibility, endurance, ROM for improvements in  [] LE / Lumbar: LE, proximal hip, and core control with self care, mobility, lifting, ambulation. [] UE / Cervical: cervical, postural, scapular, scapulothoracic and UE control with self care, reaching, carrying, lifting, house/yardwork, driving, computer work.  [] (76342) Provided verbal/tactile cueing for activities related to improving balance, coordination, kinesthetic sense, posture, motor skill, proprioception to assist with   [] LE / lumbar: LE, proximal hip, and core control in self care, mobility, lifting, ambulation and eccentric single leg control.    [] UE / cervical: cervical, scapular, scapulothoracic and UE control with self care, reaching, carrying, lifting, house/yardwork, driving, computer work.   [] (81998) Therapist is in constant attendance of 2 or more patients providing skilled therapy interventions, but not providing any significant amount of measurable one-on-one time to either patient, for improvements in  [] LE / lumbar: LE, proximal hip, and core control in self care, mobility, lifting, ambulation and eccentric single leg control. [] UE / cervical: cervical, scapular, scapulothoracic and UE control with self care, reaching, carrying, lifting, house/yardwork, driving, computer work. NMR and Therapeutic Activities:    [] (77958 or 92916) Provided verbal/tactile cueing for activities related to improving balance, coordination, kinesthetic sense, posture, motor skill, proprioception and motor activation to allow for proper function of   [] LE: / Lumbar core, proximal hip and LE with self care and ADLs  [] UE / Cervical: cervical, postural, scapular, scapulothoracic and UE control with self care, carrying, lifting, driving, computer work.   [] (24672) Gait Re-education- Provided training and instruction to the patient for proper LE, core and proximal hip recruitment and positioning and eccentric body weight control with ambulation re-education including up and down stairs     Home Management Training / Self Care:  [] (13065) Provided self-care/home management training related to activities of daily living and compensatory training, and/or use of adaptive equipment for improvement with: ADLs and compensatory training, meal preparation, safety procedures and instruction in use of adaptive equipment, including bathing, grooming, dressing, personal hygiene, basic household cleaning and chores.      Home Exercise Program:    [x] (63417) Reviewed/Progressed HEP activities related to strengthening, flexibility, endurance, ROM of   [] LE / Lumbar: core, proximal hip and LE for functional self-care, mobility, lifting and ambulation/stair navigation   [] UE / Cervical: cervical, postural, scapular, scapulothoracic and UE control with self care, reaching, carrying, lifting, house/yardwork, driving, computer work  [] (98675)Reviewed/Progressed HEP activities related to improving balance, coordination, kinesthetic sense, posture, motor skill, proprioception of   [] LE: core, proximal hip and LE for self care, mobility, lifting, and ambulation/stair navigation    [] UE / Cervical: cervical, postural,  scapular, scapulothoracic and UE control with self care, reaching, carrying, lifting, house/yardwork, driving, computer work    Manual Treatments:  PROM / STM / Oscillations-Mobs:  G-I, II, III, IV (PA's, Inf., Post.)  [] (91639) Provided manual therapy to mobilize LE, proximal hip and/or LS spine soft tissue/joints for the purpose of modulating pain, promoting relaxation,  increasing ROM, reducing/eliminating soft tissue swelling/inflammation/restriction, improving soft tissue extensibility and allowing for proper ROM for normal function with   [] LE / lumbar: self care, mobility, lifting and ambulation. [] UE / Cervical: self care, reaching, carrying, lifting, house/yardwork, driving, computer work. Modalities:  [] (72912) Vasopneumatic compression: Utilized vasopneumatic compression to decrease edema / swelling for the purpose of improving mobility and quad tone / recruitment which will allow for increased overall function including but not limited to self-care, transfers, ambulation, and ascending / descending stairs.      Charges:  Timed Code Treatment Minutes: 46   Total Treatment Minutes: 46     [] EVAL - LOW (57315)   [] EVAL - MOD (77846)  [] EVAL - HIGH (19395)  [] RE-EVAL (32853)   [x] KB(04720) x 2       [] Ionto  [] NMR (89738) x       [] Vaso  [x] Manual (77398) x 1      [] Ultrasound  [] TA x   1     [] Mech Traction (81922)  [] Aquatic Therapy x     [] ES (un) (37063):   [] Home Management Training x  [] ES(attended) (93185)   [] Dry Needling 1-2 muscles (42270):  [] Dry Needling 3+ muscles (142566)  [] Group:      [] Other:     GOALS:  Patient stated goal: get stronger and get better  []? Progressing: []? Met: []? Not Met: []? Adjusted     Therapist goals for Patient:  Get stronger and get better   Short Term Goals: To be achieved in: 2 weeks  1. Independent in HEP and progression per patient tolerance, in order to prevent re-injury. []? Progressing: []? Met: []? Not Met: []? Adjusted  2. Patient will have a decrease in pain to facilitate improvement in movement, function, and ADLs as indicated by Functional Deficits. []? Progressing: []? Met: []? Not Met: []? Adjusted     Long Term Goals: To be achieved in: 4 weeks  1. Disability index score of 10% or less for the MIKAELA to assist with reaching prior level of function. []? Progressing: []? Met: []? Not Met: []? Adjusted  2. Patient will demonstrate increased AROM to WNL lumbar spine, good LS mobility, good hip ROM to allow for proper joint functioning as indicated by patients Functional Deficits. []? Progressing: []? Met: []? Not Met: []? Adjusted  3. Patient will demonstrate an increase in Strength to 4+/5 bilateral hips with good proximal hip and core activation to allow for proper functional mobility as indicated by patients Functional Deficits. []? Progressing: []? Met: []? Not Met: []? Adjusted  4. Patient will return to functional activities including sleeping through the night without increased symptoms or restriction. []? Progressing: []? Met: []? Not Met: []? Adjusted  5. Pt will be able to walk for 1.5 miles without increased symptoms or restriction in order to resume PLOF for community walks  []? Progressing: []? Met: []? Not Met: []? Adjusted         Overall Progression Towards Functional goals/ Treatment Progress Update:  [] Patient is progressing as expected towards functional goals listed.     [] Progression is slowed due to complexities/Impairments listed. [] Progression has been slowed due to co-morbidities. [x] Plan just implemented, too soon to assess goals progression <30days   [] Goals require adjustment due to lack of progress  [] Patient is not progressing as expected and requires additional follow up with physician  [] Other    Persisting Functional Limitations/Impairments:  []Sleeping [x]Sitting               [x]Standing []Transfers        [x]Walking []Kneeling               []Stairs []Squatting / bending   []ADLs []Reaching  [x]Lifting  []Housework  []Driving []Job related tasks  []Sports/Recreation []Other:        ASSESSMENT:  Pt required significant cueing to maintain proper lumbopelvic stability. Had a lot of trouble understanding transition from quadruped to prone press-up, even after multiple therapist demonstrations and verbal/tactile cueing. Progressed exercises this session. Continue to work on lumbar stabilization and mobility. Treatment/Activity Tolerance:  [x] Patient able to complete tx  [] Patient limited by fatigue  [] Patient limited by pain  [] Patient limited by other medical complications  [] Other:     Prognosis: [x] Good [] Fair  [] Poor    Patient Requires Follow-up: [x] Yes  [] No    Plan for next treatment session:  Manual, P/A glides, mobilization as needed, core/hip strength. Progress strength after immobility/sedentary lifestyle from COVID and post THR in 2019. PLAN: See zack. PT 2x / week for 4 weeks. [x] Continue per plan of care [] Alter current plan (see comments)  [] Plan of care initiated [] Hold pending MD visit [] Discharge    Electronically signed by: Derek Shin PT    Note: If patient does not return for scheduled/ recommended follow up visits, this note will serve as a discharge from care along with most recent update on progress.

## 2021-05-13 ENCOUNTER — APPOINTMENT (OUTPATIENT)
Dept: PHYSICAL THERAPY | Age: 76
End: 2021-05-13
Payer: MEDICARE

## 2021-05-19 ENCOUNTER — HOSPITAL ENCOUNTER (OUTPATIENT)
Dept: PHYSICAL THERAPY | Age: 76
Setting detail: THERAPIES SERIES
Discharge: HOME | End: 2021-05-19
Payer: MEDICARE

## 2021-05-19 PROCEDURE — 97530 THERAPEUTIC ACTIVITIES: CPT

## 2021-05-19 PROCEDURE — 97110 THERAPEUTIC EXERCISES: CPT

## 2021-05-19 PROCEDURE — 97140 MANUAL THERAPY 1/> REGIONS: CPT

## 2021-05-19 NOTE — FLOWSHEET NOTE
side    Bridging    15           Quadruped:  Donkey kick  Fire hydrants                TG  NV     SB rows  NV     Therapeutic Activities (79138)                     Multifidi Walkout  2 plates 3 B 3 steps 5 presses    Chops high to low, low to high 2 plates  10 B           Neuromuscular Re-ed (79169)       Swiss Ball pelvic tilt- A/P, Side to side, clockwise, CCW  20 each     Tandem on airex with weight ball lifts/twistws  NV                                 Manual Intervention (20051)       P/A Glides       Mobs/Manip       Ball/belt traction        STM to lumbar paraspinals  x12'                       Modalities:     Pt. Education:  -patient educated on diagnosis, prognosis and expectations for rehab  -all patient questions were answered    Home Exercise Program:    Access Code: [email or web address]  URL: [email or web address]  Date: 04/29/2021  Prepared by: Ovidio Morrison      Exercises  Supine Single Knee to Chest - 2-3 x daily - 7 x weekly - 1 sets - 5 reps - 10 secs hold  Bridge - 2 x daily - 7 x weekly - 10 reps - 2-3 sets  Clamshell with Resistance - 2 x daily - 7 x weekly - 10 reps - 2-3 sets  Seated Hamstring Stretch - 2-3 x daily - 7 x weekly - 1 sets - 3 reps - 20 secs hold  Quadruped Rock Back into [employer] Up - 2-3 x daily - 7 x weekly - 1 sets - 10 reps      Therapeutic Exercise and NMR EXR  [] (27155) Provided verbal/tactile cueing for activities related to strengthening, flexibility, endurance, ROM for improvements in  [] LE / Lumbar: LE, proximal hip, and core control with self care, mobility, lifting, ambulation.   [] UE / Cervical: cervical, postural, scapular, scapulothoracic and UE control with self care, reaching, carrying, lifting, house/yardwork, driving, computer work.  [] (63003) Provided verbal/tactile cueing for activities related to improving balance, coordination, kinesthetic sense, posture, motor skill, proprioception to assist with   [] LE / lumbar: LE, proximal hip, and core control in self care, mobility, lifting, ambulation and eccentric single leg control. [] UE / cervical: cervical, scapular, scapulothoracic and UE control with self care, reaching, carrying, lifting, house/yardwork, driving, computer work.   [] (67400) Therapist is in constant attendance of 2 or more patients providing skilled therapy interventions, but not providing any significant amount of measurable one-on-one time to either patient, for improvements in  [] LE / lumbar: LE, proximal hip, and core control in self care, mobility, lifting, ambulation and eccentric single leg control. [] UE / cervical: cervical, scapular, scapulothoracic and UE control with self care, reaching, carrying, lifting, house/yardwork, driving, computer work. NMR and Therapeutic Activities:    [] (09497 or 79133) Provided verbal/tactile cueing for activities related to improving balance, coordination, kinesthetic sense, posture, motor skill, proprioception and motor activation to allow for proper function of   [] LE: / Lumbar core, proximal hip and LE with self care and ADLs  [] UE / Cervical: cervical, postural, scapular, scapulothoracic and UE control with self care, carrying, lifting, driving, computer work.   [] (02071) Gait Re-education- Provided training and instruction to the patient for proper LE, core and proximal hip recruitment and positioning and eccentric body weight control with ambulation re-education including up and down stairs     Home Management Training / Self Care:  [] (70198) Provided self-care/home management training related to activities of daily living and compensatory training, and/or use of adaptive equipment for improvement with: ADLs and compensatory training, meal preparation, safety procedures and instruction in use of adaptive equipment, including bathing, grooming, dressing, personal hygiene, basic household cleaning and chores.      Home Exercise Program:    [x] (63668) Reviewed/Progressed HEP activities related to strengthening, flexibility, endurance, ROM of   [] LE / Lumbar: core, proximal hip and LE for functional self-care, mobility, lifting and ambulation/stair navigation   [] UE / Cervical: cervical, postural, scapular, scapulothoracic and UE control with self care, reaching, carrying, lifting, house/yardwork, driving, computer work  [] (05270)Reviewed/Progressed HEP activities related to improving balance, coordination, kinesthetic sense, posture, motor skill, proprioception of   [] LE: core, proximal hip and LE for self care, mobility, lifting, and ambulation/stair navigation    [] UE / Cervical: cervical, postural,  scapular, scapulothoracic and UE control with self care, reaching, carrying, lifting, house/yardwork, driving, computer work    Manual Treatments:  PROM / STM / Oscillations-Mobs:  G-I, II, III, IV (PA's, Inf., Post.)  [] (89784) Provided manual therapy to mobilize LE, proximal hip and/or LS spine soft tissue/joints for the purpose of modulating pain, promoting relaxation,  increasing ROM, reducing/eliminating soft tissue swelling/inflammation/restriction, improving soft tissue extensibility and allowing for proper ROM for normal function with   [] LE / lumbar: self care, mobility, lifting and ambulation. [] UE / Cervical: self care, reaching, carrying, lifting, house/yardwork, driving, computer work. Modalities:  [] (89389) Vasopneumatic compression: Utilized vasopneumatic compression to decrease edema / swelling for the purpose of improving mobility and quad tone / recruitment which will allow for increased overall function including but not limited to self-care, transfers, ambulation, and ascending / descending stairs.      Charges:  Timed Code Treatment Minutes: 50   Total Treatment Minutes: 50     [] EVAL - LOW (44526)   [] EVAL - MOD (43822)  [] EVAL - HIGH (19123)  [] RE-EVAL (90219)   [x] GP(31403) x 1       [] Ionto  [] NMR (13586) x       [] Vaso  [x] Manual (00393) x 1      [] Ultrasound  [x] TA x   1     [] [facility] Traction (20812)  [] Aquatic Therapy x     [] ES (un) (46735):   [] Home Management Training x  [] ES(attended) (99046)   [] Dry Needling 1-2 muscles (30897):  [] Dry Needling 3+ muscles (832798)  [] Group:      [] Other:     GOALS:  Patient stated goal: get stronger and get better  []? Progressing: []? Met: []? Not Met: []? Adjusted     Therapist goals for Patient:  Get stronger and get better   Short Term Goals: To be achieved in: 2 weeks  1. Independent in HEP and progression per patient tolerance, in order to prevent re-injury. []? Progressing: []? Met: []? Not Met: []? Adjusted  2. Patient will have a decrease in pain to facilitate improvement in movement, function, and ADLs as indicated by Functional Deficits. []? Progressing: []? Met: []? Not Met: []? Adjusted     Long Term Goals: To be achieved in: 4 weeks  1. Disability index score of 10% or less for the MIKAELA to assist with reaching prior level of function. []? Progressing: []? Met: []? Not Met: []? Adjusted  2. Patient will demonstrate increased AROM to WNL lumbar spine, good LS mobility, good hip ROM to allow for proper joint functioning as indicated by patients Functional Deficits. []? Progressing: []? Met: []? Not Met: []? Adjusted  3. Patient will demonstrate an increase in Strength to 4+/5 bilateral hips with good proximal hip and core activation to allow for proper functional mobility as indicated by patients Functional Deficits. []? Progressing: []? Met: []? Not Met: []? Adjusted  4. Patient will return to functional activities including sleeping through the night without increased symptoms or restriction. []? Progressing: []? Met: []? Not Met: []? Adjusted  5. Pt will be able to walk for 1.5 miles without increased symptoms or restriction in order to resume PLOF for community walks  []? Progressing: []? Met: []? Not Met: []?  Adjusted         Overall Progression Towards Functional goals/ Treatment Progress Update:  [] Patient is progressing as expected towards functional goals listed. [] Progression is slowed due to complexities/Impairments listed. [] Progression has been slowed due to co-morbidities. [x] Plan just implemented, too soon to assess goals progression <30days   [] Goals require adjustment due to lack of progress  [] Patient is not progressing as expected and requires additional follow up with physician  [] Other    Persisting Functional Limitations/Impairments:  []Sleeping [x]Sitting               [x]Standing []Transfers        [x]Walking []Kneeling               []Stairs []Squatting / bending   []ADLs []Reaching  [x]Lifting  []Housework  []Driving []Job related tasks  []Sports/Recreation []Other:        ASSESSMENT:  Pt reported she was feeling better and has been improving with therapy. Pt was able to progress and include the chops from low to high and high to low. SKTC and Quadruped were excluded today due to lack of time. Pt will be evaluated next visit to assess her response to therapy and the inclusion of the chops. Will continue to progress as tolerated. Treatment/Activity Tolerance:  [x] Patient able to complete tx  [] Patient limited by fatigue  [] Patient limited by pain  [] Patient limited by other medical complications  [] Other:     Prognosis: [x] Good [] Fair  [] Poor    Patient Requires Follow-up: [x] Yes  [] No    Plan for next treatment session:  Manual, P/A glides, mobilization as needed, core/hip strength. Progress strength after immobility/sedentary lifestyle from COVID and post THR in 2019. PLAN: See eval. PT 2x / week for 4 weeks.    [x] Continue per plan of care [] Alter current plan (see comments)  [] Plan of care initiated [] Hold pending MD visit [] Discharge    Electronically signed by: Anna Bravo, PT DPT Verner Everts, SPT    Therapist was present, directed the patient's care, made skilled judgement, and was responsible for assessment and treatment of the patient. Note: If patient does not return for scheduled/ recommended follow up visits, this note will serve as a discharge from care along with most recent update on progress.

## 2021-05-21 ENCOUNTER — HOSPITAL ENCOUNTER (OUTPATIENT)
Dept: PHYSICAL THERAPY | Age: 76
Setting detail: THERAPIES SERIES
Discharge: HOME | End: 2021-05-21
Payer: MEDICARE

## 2021-05-21 PROCEDURE — 97110 THERAPEUTIC EXERCISES: CPT

## 2021-05-21 PROCEDURE — 97530 THERAPEUTIC ACTIVITIES: CPT

## 2021-05-21 PROCEDURE — 97112 NEUROMUSCULAR REEDUCATION: CPT

## 2021-05-21 NOTE — FLOWSHEET NOTE
[address] [facility] Physical Therapy  Phone: [phone]   Fax: [phone]    Physical Therapy Daily Treatment Note  Date:  2021    Patient Name:  Quique Beck    :  1945  MRN: [id number]  Medical/Treatment Diagnosis Information:  · Diagnosis: Chronic low back pain M54.5, G89.29  · Treatment Diagnosis: Impaired lumbar mobility, decreased core/hip strength  Insurance/Certification information:  PT Insurance Information: Medicare  Physician Information:  Referring Practitioner: Waqas Casey  Plan of care signed (Y/N): []  Yes [x]  No     Date of Patient follow up with Physician:      Progress Report: []  Yes  [x]  No     Date Range for reporting period:  Beginnin/29   Ending:     Progress report due (10 Rx/or 30 days whichever is less): visit #9 or 3/60     Recertification due (POC duration/ or 90 days whichever is less): visit # 9    Visit # Insurance Allowable Auth required? Date Range    [place] []  Yes  [x]  No      Latex Allergy:  [x]NO      []YES  Preferred Language for Healthcare:   [x]English       []other:    Functional Scale:        Date assessed:  Oswestry: raw score = 5; dysfunction = 14.2%       Pain level:  2/10      SUBJECTIVE:  Not the pain like it was, just more sore overall. Getting better. Pt has been feeling better. Sore when getting up in the morning, but when getting up it's gone.       OBJECTIVE: See eval      RESTRICTIONS/PRECAUTIONS:  History of R THR, osteoporosis     Exercises/Interventions:     Therapeutic Exercises (62043) Resistance / level Sets/sec Reps Notes   Bike  X 5'     IB, HR  2 x 30\" X 10 HR    HS Stretch   2 x 30\"     DKTC           Prayer to Prone Press up Rocking   3\" 10  prayer to neutral only due to pt unable to comprehend movement    PPT Hooklying     PPT with alt Marching        PPT with SKTC      S/L Clamshells : Did 1 set B due to tenderness on R side of hip      tenderness over outer R hip lying on R side    Bridging               Quadruped:  Donkey kick  Fire hydrants                TG squat with overhead press Red MB  15    SB rows 3 plates  4 plates  5 plates 1  1  1 10  10  10                         Therapeutic Activities (94746)                     Multifidi Walkout  2 plates 3 B 3 steps 5 presses    Chops high to low, low to high 3 plates H/L  2 plates L/H  10 B each           Neuromuscular Re-ed (04990)       Swiss Ball pelvic tilt- A/P, Side to side, clockwise, CCW  20 each     Tandem on airex with weight ball lifts/twists Soccer ball 20\" X 2 B                                Manual Intervention (22589)       P/A Glides       Mobs/Manip       Ball/belt traction        STM to lumbar paraspinals                         Modalities:     Pt. Education:  -patient educated on diagnosis, prognosis and expectations for rehab  -all patient questions were answered    Home Exercise Program:    Access Code: [email or web address]  URL: [email or web address]  Date: 04/29/2021  Prepared by: Fabricio Veras      Exercises  Supine Single Knee to Chest - 2-3 x daily - 7 x weekly - 1 sets - 5 reps - 10 secs hold  Bridge - 2 x daily - 7 x weekly - 10 reps - 2-3 sets  Clamshell with Resistance - 2 x daily - 7 x weekly - 10 reps - 2-3 sets  Seated Hamstring Stretch - 2-3 x daily - 7 x weekly - 1 sets - 3 reps - 20 secs hold  Quadruped Rock Back into [employer] Up - 2-3 x daily - 7 x weekly - 1 sets - 10 reps      Therapeutic Exercise and NMR EXR  [] (04985) Provided verbal/tactile cueing for activities related to strengthening, flexibility, endurance, ROM for improvements in  [] LE / Lumbar: LE, proximal hip, and core control with self care, mobility, lifting, ambulation.   [] UE / Cervical: cervical, postural, scapular, scapulothoracic and UE control with self care, reaching, carrying, lifting, house/yardwork, driving, computer work.  [] (90512) Provided verbal/tactile cueing for activities related to improving balance, coordination, kinesthetic sense, posture, motor skill, proprioception to assist with   [] LE / lumbar: LE, proximal hip, and core control in self care, mobility, lifting, ambulation and eccentric single leg control. [] UE / cervical: cervical, scapular, scapulothoracic and UE control with self care, reaching, carrying, lifting, house/yardwork, driving, computer work.   [] (95961) Therapist is in constant attendance of 2 or more patients providing skilled therapy interventions, but not providing any significant amount of measurable one-on-one time to either patient, for improvements in  [] LE / lumbar: LE, proximal hip, and core control in self care, mobility, lifting, ambulation and eccentric single leg control. [] UE / cervical: cervical, scapular, scapulothoracic and UE control with self care, reaching, carrying, lifting, house/yardwork, driving, computer work.      NMR and Therapeutic Activities:    [] (26856 or 55055) Provided verbal/tactile cueing for activities related to improving balance, coordination, kinesthetic sense, posture, motor skill, proprioception and motor activation to allow for proper function of   [] LE: / Lumbar core, proximal hip and LE with self care and ADLs  [] UE / Cervical: cervical, postural, scapular, scapulothoracic and UE control with self care, carrying, lifting, driving, computer work.   [] (28275) Gait Re-education- Provided training and instruction to the patient for proper LE, core and proximal hip recruitment and positioning and eccentric body weight control with ambulation re-education including up and down stairs     Home Management Training / Self Care:  [] (58539) Provided self-care/home management training related to activities of daily living and compensatory training, and/or use of adaptive equipment for improvement with: ADLs and compensatory training, meal preparation, safety procedures and instruction in use of adaptive equipment, including bathing, grooming, dressing, personal hygiene, basic household cleaning and chores. Home Exercise Program:    [x] (48860) Reviewed/Progressed HEP activities related to strengthening, flexibility, endurance, ROM of   [] LE / Lumbar: core, proximal hip and LE for functional self-care, mobility, lifting and ambulation/stair navigation   [] UE / Cervical: cervical, postural, scapular, scapulothoracic and UE control with self care, reaching, carrying, lifting, house/yardwork, driving, computer work  [] (27630)Reviewed/Progressed HEP activities related to improving balance, coordination, kinesthetic sense, posture, motor skill, proprioception of   [] LE: core, proximal hip and LE for self care, mobility, lifting, and ambulation/stair navigation    [] UE / Cervical: cervical, postural,  scapular, scapulothoracic and UE control with self care, reaching, carrying, lifting, house/yardwork, driving, computer work    Manual Treatments:  PROM / STM / Oscillations-Mobs:  G-I, II, III, IV (PA's, Inf., Post.)  [] (27669) Provided manual therapy to mobilize LE, proximal hip and/or LS spine soft tissue/joints for the purpose of modulating pain, promoting relaxation,  increasing ROM, reducing/eliminating soft tissue swelling/inflammation/restriction, improving soft tissue extensibility and allowing for proper ROM for normal function with   [] LE / lumbar: self care, mobility, lifting and ambulation. [] UE / Cervical: self care, reaching, carrying, lifting, house/yardwork, driving, computer work. Modalities:  [] (00239) Vasopneumatic compression: Utilized vasopneumatic compression to decrease edema / swelling for the purpose of improving mobility and quad tone / recruitment which will allow for increased overall function including but not limited to self-care, transfers, ambulation, and ascending / descending stairs.      Charges:  Timed Code Treatment Minutes: 42   Total Treatment Minutes: 42     [] EVAL - LOW (81514)   [] EVAL - MOD (35677)  [] EVAL - HIGH (80849)  [] RE-EVAL (16920)   [x] FV(80666) x 1       [] Ionto  [x] NMR (83314) x       [] Vaso  [] Manual (15383) x 1      [] Ultrasound  [x] TA x   1     [] Mech Traction (22421)  [] Aquatic Therapy x      [] ES (un) (83200):   [] Home Management Training x  [] ES(attended) (92600)   [] Dry Needling 1-2 muscles (35583):  [] Dry Needling 3+ muscles (129526)  [] Group:      [] Other:     GOALS:  Patient stated goal: get stronger and get better  []? Progressing: []? Met: []? Not Met: []? Adjusted     Therapist goals for Patient:  Get stronger and get better   Short Term Goals: To be achieved in: 2 weeks  1. Independent in HEP and progression per patient tolerance, in order to prevent re-injury. []? Progressing: []? Met: []? Not Met: []? Adjusted  2. Patient will have a decrease in pain to facilitate improvement in movement, function, and ADLs as indicated by Functional Deficits. []? Progressing: []? Met: []? Not Met: []? Adjusted     Long Term Goals: To be achieved in: 4 weeks  1. Disability index score of 10% or less for the MIKAELA to assist with reaching prior level of function. []? Progressing: []? Met: []? Not Met: []? Adjusted  2. Patient will demonstrate increased AROM to WNL lumbar spine, good LS mobility, good hip ROM to allow for proper joint functioning as indicated by patients Functional Deficits. []? Progressing: []? Met: []? Not Met: []? Adjusted  3. Patient will demonstrate an increase in Strength to 4+/5 bilateral hips with good proximal hip and core activation to allow for proper functional mobility as indicated by patients Functional Deficits. []? Progressing: []? Met: []? Not Met: []? Adjusted  4. Patient will return to functional activities including sleeping through the night without increased symptoms or restriction. []? Progressing: []? Met: []? Not Met: []? Adjusted  5.  Pt will be able to walk for 1.5 miles without increased symptoms or restriction in order to resume PLOF for community walks  []? Progressing: []? Met: []? Not Met: []? Adjusted         Overall Progression Towards Functional goals/ Treatment Progress Update:  [] Patient is progressing as expected towards functional goals listed. [] Progression is slowed due to complexities/Impairments listed. [] Progression has been slowed due to co-morbidities. [x] Plan just implemented, too soon to assess goals progression <30days   [] Goals require adjustment due to lack of progress  [] Patient is not progressing as expected and requires additional follow up with physician  [] Other    Persisting Functional Limitations/Impairments:  []Sleeping [x]Sitting               [x]Standing []Transfers        [x]Walking []Kneeling               []Stairs []Squatting / bending   []ADLs []Reaching  [x]Lifting  []Housework  []Driving []Job related tasks  []Sports/Recreation []Other:        ASSESSMENT:  Updated exercises as noted in log with good response and fair overall trunk control. Moderate cues needed for good technique with wood chops, but no increase in pain, and good rotational mobility  Pt reported she was feeling better and has been improving with therapy. May look to progress towards HEP/gym program and discharge with maintenance program next week if continued improvement and reduction in symptoms is seen    Treatment/Activity Tolerance:  [x] Patient able to complete tx  [] Patient limited by fatigue  [] Patient limited by pain  [] Patient limited by other medical complications  [] Other:     Prognosis: [x] Good [] Fair  [] Poor    Patient Requires Follow-up: [x] Yes  [] No    Plan for next treatment session:  Manual, P/A glides, mobilization as needed, core/hip strength. Progress strength after immobility/sedentary lifestyle from COVID and post THR in 2019. PLAN: See eval. PT 2x / week for 4 weeks.    [x] Continue per plan of care [] Alter current plan (see comments)  [] Plan of care initiated [] Hold pending MD visit [] Discharge    Electronically signed by: Jarvis Mcintyre PT     Note: If patient does not return for scheduled/ recommended follow up visits, this note will serve as a discharge from care along with most recent update on progress.

## 2021-05-25 ENCOUNTER — HOSPITAL ENCOUNTER (OUTPATIENT)
Dept: PHYSICAL THERAPY | Age: 76
Setting detail: THERAPIES SERIES
Discharge: HOME | End: 2021-05-25
Payer: MEDICARE

## 2021-05-25 PROCEDURE — 97112 NEUROMUSCULAR REEDUCATION: CPT

## 2021-05-25 PROCEDURE — 97110 THERAPEUTIC EXERCISES: CPT

## 2021-05-25 PROCEDURE — 97530 THERAPEUTIC ACTIVITIES: CPT

## 2021-05-25 NOTE — FLOWSHEET NOTE
[address] [facility] Physical Therapy  Phone: [phone]   Fax: [phone]    Physical Therapy Daily Treatment Note  Date:  2021    Patient Name:  Glen Rosas    :  1945  MRN: [id number]  Medical/Treatment Diagnosis Information:  · Diagnosis: Chronic low back pain M54.5, G89.29  · Treatment Diagnosis: Impaired lumbar mobility, decreased core/hip strength  Insurance/Certification information:  PT Insurance Information: Medicare  Physician Information:  Referring Practitioner: Mollie Duverney  Plan of care signed (Y/N): []  Yes [x]  No     Date of Patient follow up with Physician:      Progress Report: []  Yes  [x]  No     Date Range for reporting period:  Beginnin/29   Ending:     Progress report due (10 Rx/or 30 days whichever is less): visit #9 or      Recertification due (POC duration/ or 90 days whichever is less): visit # 9    Visit # Insurance Allowable Auth required? Date Range    [place] []  Yes  [x]  No      Latex Allergy:  [x]NO      []YES  Preferred Language for Healthcare:   [x]English       []other:    Functional Scale:        Date assessed:  Oswestry: raw score = 5; dysfunction = 14.2%       Pain level:  1/10      SUBJECTIVE:  Pt states they were sore after last session but it was not as bad as previous times. Pt stated they had no pain. Pt states nothing else new has occurred.       OBJECTIVE: See eval      RESTRICTIONS/PRECAUTIONS:  History of R THR, osteoporosis     Exercises/Interventions:     Therapeutic Exercises (17018) Resistance / level Sets/sec Reps Notes   Bike  X 5'     IB, HR  2 x 30\" X 10 HR    HS Stretch   2 x 30\"     DKTC With overpressure (self pulling on legs)10\" 10           Prayer to Prone Press up Rocking   3\" 10  prayer to neutral only due to pt unable to comprehend movement    PPT Hooklying     PPT with alt Marching        PPT with SKTC      S/L Clamshells : Did 1 set B due to tenderness on R side of hip 5/11 tenderness over outer R hip lying on R side    Bridging               Quadruped:  Donkey kick  Fire hydrants                TG squat with overhead press Red MB  15    SB rows   4 plates  5 plates  6 plates   1  1  1   10  10  10                         Therapeutic Activities (96319)                     Multifidi Walkout  2 plates 3 B 3 steps 5 presses    Chops high to low, low to high 3 plates H/L  2 plates L/H  10 B each NV: 3 plates for L/H          Neuromuscular Re-ed (13271)       Swiss Ball pelvic tilt- A/P, Side to side, clockwise, CCW  20 each     Tandem on airex with weight ball lifts/twists with finger count Soccer ball 20\" X 2 B LOB with head movement                               Manual Intervention (73475)       P/A Glides       Mobs/Manip       Ball/belt traction        STM to lumbar paraspinals                         Modalities:     Pt. Education:  -patient educated on diagnosis, prognosis and expectations for rehab  -all patient questions were answered    Home Exercise Program:    Access Code: [email or web address]  URL: [employer] [place]/  Date: 04/29/2021  Prepared by: Surinder Steiner      Exercises  Supine Single Knee to Chest - 2-3 x daily - 7 x weekly - 1 sets - 5 reps - 10 secs hold  Bridge - 2 x daily - 7 x weekly - 10 reps - 2-3 sets  Clamshell with Resistance - 2 x daily - 7 x weekly - 10 reps - 2-3 sets  Seated Hamstring Stretch - 2-3 x daily - 7 x weekly - 1 sets - 3 reps - 20 secs hold  Quadruped Rock Back into [employer] Up - 2-3 x daily - 7 x weekly - 1 sets - 10 reps      Therapeutic Exercise and NMR EXR  [] (26002) Provided verbal/tactile cueing for activities related to strengthening, flexibility, endurance, ROM for improvements in  [] LE / Lumbar: LE, proximal hip, and core control with self care, mobility, lifting, ambulation.   [] UE / Cervical: cervical, postural, scapular, scapulothoracic and UE control with self care, reaching, carrying, lifting, house/yardwork, driving, computer work.  [] (41666) Provided verbal/tactile cueing for activities related to improving balance, coordination, kinesthetic sense, posture, motor skill, proprioception to assist with   [] LE / lumbar: LE, proximal hip, and core control in self care, mobility, lifting, ambulation and eccentric single leg control. [] UE / cervical: cervical, scapular, scapulothoracic and UE control with self care, reaching, carrying, lifting, house/yardwork, driving, computer work.   [] (44653) Therapist is in constant attendance of 2 or more patients providing skilled therapy interventions, but not providing any significant amount of measurable one-on-one time to either patient, for improvements in  [] LE / lumbar: LE, proximal hip, and core control in self care, mobility, lifting, ambulation and eccentric single leg control. [] UE / cervical: cervical, scapular, scapulothoracic and UE control with self care, reaching, carrying, lifting, house/yardwork, driving, computer work.      NMR and Therapeutic Activities:    [] (50081 or 35215) Provided verbal/tactile cueing for activities related to improving balance, coordination, kinesthetic sense, posture, motor skill, proprioception and motor activation to allow for proper function of   [] LE: / Lumbar core, proximal hip and LE with self care and ADLs  [] UE / Cervical: cervical, postural, scapular, scapulothoracic and UE control with self care, carrying, lifting, driving, computer work.   [] (62752) Gait Re-education- Provided training and instruction to the patient for proper LE, core and proximal hip recruitment and positioning and eccentric body weight control with ambulation re-education including up and down stairs     Home Management Training / Self Care:  [] (12035) Provided self-care/home management training related to activities of daily living and compensatory training, and/or use of adaptive equipment for improvement with: ADLs and compensatory training, meal preparation, safety procedures and instruction in use of adaptive equipment, including bathing, grooming, dressing, personal hygiene, basic household cleaning and chores. Home Exercise Program:    [x] (66070) Reviewed/Progressed HEP activities related to strengthening, flexibility, endurance, ROM of   [] LE / Lumbar: core, proximal hip and LE for functional self-care, mobility, lifting and ambulation/stair navigation   [] UE / Cervical: cervical, postural, scapular, scapulothoracic and UE control with self care, reaching, carrying, lifting, house/yardwork, driving, computer work  [] (77361)Reviewed/Progressed HEP activities related to improving balance, coordination, kinesthetic sense, posture, motor skill, proprioception of   [] LE: core, proximal hip and LE for self care, mobility, lifting, and ambulation/stair navigation    [] UE / Cervical: cervical, postural,  scapular, scapulothoracic and UE control with self care, reaching, carrying, lifting, house/yardwork, driving, computer work    Manual Treatments:  PROM / STM / Oscillations-Mobs:  G-I, II, III, IV (PA's, Inf., Post.)  [] (85842) Provided manual therapy to mobilize LE, proximal hip and/or LS spine soft tissue/joints for the purpose of modulating pain, promoting relaxation,  increasing ROM, reducing/eliminating soft tissue swelling/inflammation/restriction, improving soft tissue extensibility and allowing for proper ROM for normal function with   [] LE / lumbar: self care, mobility, lifting and ambulation. [] UE / Cervical: self care, reaching, carrying, lifting, house/yardwork, driving, computer work. Modalities:  [] (78150) Vasopneumatic compression: Utilized vasopneumatic compression to decrease edema / swelling for the purpose of improving mobility and quad tone / recruitment which will allow for increased overall function including but not limited to self-care, transfers, ambulation, and ascending / descending stairs.      Charges:  Timed Code Treatment Minutes: 45   Total Treatment Minutes: 45     [] EVAL - LOW (99634)   [] EVAL - MOD (51025)  [] EVAL - HIGH (59228)  [] RE-EVAL (99489)   [x] GG(08380) x 1       [] Ionto  [x] NMR (15054) x 1      [] Vaso  [] Manual (91146) x 1      [] Ultrasound  [x] TA x   1     [] Mech Traction (27753)  [] Aquatic Therapy x      [] ES (un) (50328):   [] Home Management Training x  [] ES(attended) (25432)   [] Dry Needling 1-2 muscles (42291):  [] Dry Needling 3+ muscles (360984)  [] Group:      [] Other:     GOALS:  Patient stated goal: get stronger and get better  []? Progressing: []? Met: []? Not Met: []? Adjusted     Therapist goals for Patient:  Get stronger and get better   Short Term Goals: To be achieved in: 2 weeks  1. Independent in HEP and progression per patient tolerance, in order to prevent re-injury. []? Progressing: []? Met: []? Not Met: []? Adjusted  2. Patient will have a decrease in pain to facilitate improvement in movement, function, and ADLs as indicated by Functional Deficits. []? Progressing: []? Met: []? Not Met: []? Adjusted     Long Term Goals: To be achieved in: 4 weeks  1. Disability index score of 10% or less for the MIKAELA to assist with reaching prior level of function. []? Progressing: []? Met: []? Not Met: []? Adjusted  2. Patient will demonstrate increased AROM to WNL lumbar spine, good LS mobility, good hip ROM to allow for proper joint functioning as indicated by patients Functional Deficits. []? Progressing: []? Met: []? Not Met: []? Adjusted  3. Patient will demonstrate an increase in Strength to 4+/5 bilateral hips with good proximal hip and core activation to allow for proper functional mobility as indicated by patients Functional Deficits. []? Progressing: []? Met: []? Not Met: []? Adjusted  4. Patient will return to functional activities including sleeping through the night without increased symptoms or restriction. []? Progressing: []? Met: []?  Not Met: []? initiated [] Hold pending MD visit [] Discharge    Electronically signed by: Valentin Contreras PT DPT  Keiko Sher    Therapist was present, directed the patient's care, made skilled judgement, and was responsible for assessment and treatment of the patient. Note: If patient does not return for scheduled/ recommended follow up visits, this note will serve as a discharge from care along with most recent update on progress.

## 2021-05-27 ENCOUNTER — HOSPITAL ENCOUNTER (OUTPATIENT)
Dept: PHYSICAL THERAPY | Age: 76
Setting detail: THERAPIES SERIES
Discharge: HOME | End: 2021-05-27
Payer: MEDICARE

## 2021-05-27 PROCEDURE — 97112 NEUROMUSCULAR REEDUCATION: CPT

## 2021-05-27 PROCEDURE — 97530 THERAPEUTIC ACTIVITIES: CPT

## 2021-05-27 PROCEDURE — 97110 THERAPEUTIC EXERCISES: CPT

## 2021-05-27 NOTE — FLOWSHEET NOTE
[address] [facility]  Phone: [phone]   Fax: [phone]     Physical Therapy Discharge Summary    Dear Dr Archana Stacy,    We had the pleasure of treating the following patient for physical therapy services at [facility]. A summary of our findings can be found in the discharge summary below. If you have any questions or concerns regarding these findings, please do not hesitate to contact me at the office phone number checked above. Thank you for the referral.     Physician Signature:________________________________Date:__________________  By signing above (or electronic signature), therapists plan is approved by physician      Functional Outcome:     ROM   Comments   Lumbar Flex WNL    Lumbar Ext WNL       ROM LEFT RIGHT Comments   Lumbar Side Bend WNL WNL     Lumbar Rotation WNL WNL     Hip Flexion >120* > 120      Hip Abd         Hip ER         Hip IR 25 25     Hip Extension         Knee Ext         Knee Flex         Hamstring Flex Lacking 10* Lacking 10*     Piriformis WNL WNL                               Joint mobility:               []?Normal                      [x]? Hypo              []?Hyper        Strength / Myotomes LEFT RIGHT Comments   Multifidus         Transverse Ab 4       Hip Flexors (L1-2) 4+ 4+     Hip Abductors 4+ 4+     Hip Extensors         Hip Internal Rotators         Hip External Rotators         Quads (L2-4) 5 5     Hamstrings  5 5     Ankle Plantarflexion (S1-2)         Ankle Dorsiflexion (L4-5) 5 5             Overall Response to Treatment:   [x]Patient is responding well to treatment and improvement is noted with regards  to goals   []Patient should continue to improve in reasonable time if they continue HEP   []Patient has plateaued and is no longer responding to skilled PT intervention    []Patient is getting worse and would benefit from return to referring MD   []Patient unable to adhere to initial POC   []Other:     Date range of Visits:  to   Total Visits: 8    Recommendation:    [x] Discharge to HEP. Follow up with PT or MD PRN. Physical Therapy Daily Treatment Note  Date:  2021    Patient Name:  Polly Gordon    :  1945  MRN: [id number]  Medical/Treatment Diagnosis Information:  · Diagnosis: Chronic low back pain M54.5, G89.29  · Treatment Diagnosis: Impaired lumbar mobility, decreased core/hip strength  Insurance/Certification information:  PT Insurance Information: Medicare  Physician Information:  Referring Practitioner: Miranda Joseph 47 of care signed (Y/N): []  Yes [x]  No     Date of Patient follow up with Physician:      Progress Report: []  Yes  [x]  No     Date Range for reporting period:  Beginnin/29   Ending:     Progress report due (10 Rx/or 30 days whichever is less): visit #9 or      Recertification due (POC duration/ or 90 days whichever is less): visit # 9    Visit # Insurance Allowable Auth required? Date Range    [place] []  Yes  [x]  No      Latex Allergy:  [x]NO      []YES  Preferred Language for Healthcare:   [x]English       []other:    Functional Scale:        Date assessed:  Oswestry: raw score = 5; dysfunction = 14.2%     Oswestry: raw score =; dysfunction =          Pain level:  0/10      SUBJECTIVE:  Said the her back was sore for 1/2 day after doing the exercise, but otherwise is doing pretty good. Not having any pain anymore, just soreness.   Feels that she is around 100% of normal.     OBJECTIVE: See eval      RESTRICTIONS/PRECAUTIONS:  History of R THR, osteoporosis     Exercises/Interventions:     Therapeutic Exercises (07622) Resistance / level Sets/sec Reps Notes   Bike  X 5'                  prayer to neutral only due to pt unable to comprehend movement    PPT Hooklying     PPT with alt Marching        PPT with SKTC      S/L Clamshells : Did 1 set B due to tenderness on R side of hip      self care, reaching, carrying, lifting, house/yardwork, driving, computer work.  [] (52207) Provided verbal/tactile cueing for activities related to improving balance, coordination, kinesthetic sense, posture, motor skill, proprioception to assist with   [] LE / lumbar: LE, proximal hip, and core control in self care, mobility, lifting, ambulation and eccentric single leg control. [] UE / cervical: cervical, scapular, scapulothoracic and UE control with self care, reaching, carrying, lifting, house/yardwork, driving, computer work.   [] (69601) Therapist is in constant attendance of 2 or more patients providing skilled therapy interventions, but not providing any significant amount of measurable one-on-one time to either patient, for improvements in  [] LE / lumbar: LE, proximal hip, and core control in self care, mobility, lifting, ambulation and eccentric single leg control. [] UE / cervical: cervical, scapular, scapulothoracic and UE control with self care, reaching, carrying, lifting, house/yardwork, driving, computer work.      NMR and Therapeutic Activities:    [] (53665 or 45502) Provided verbal/tactile cueing for activities related to improving balance, coordination, kinesthetic sense, posture, motor skill, proprioception and motor activation to allow for proper function of   [] LE: / Lumbar core, proximal hip and LE with self care and ADLs  [] UE / Cervical: cervical, postural, scapular, scapulothoracic and UE control with self care, carrying, lifting, driving, computer work.   [] (32083) Gait Re-education- Provided training and instruction to the patient for proper LE, core and proximal hip recruitment and positioning and eccentric body weight control with ambulation re-education including up and down stairs     Home Management Training / Self Care:  [] (93193) Provided self-care/home management training related to activities of daily living and compensatory training, and/or use of adaptive equipment for improvement with: ADLs and compensatory training, meal preparation, safety procedures and instruction in use of adaptive equipment, including bathing, grooming, dressing, personal hygiene, basic household cleaning and chores. Home Exercise Program:    [x] (14199) Reviewed/Progressed HEP activities related to strengthening, flexibility, endurance, ROM of   [] LE / Lumbar: core, proximal hip and LE for functional self-care, mobility, lifting and ambulation/stair navigation   [] UE / Cervical: cervical, postural, scapular, scapulothoracic and UE control with self care, reaching, carrying, lifting, house/yardwork, driving, computer work  [] (53263)Reviewed/Progressed HEP activities related to improving balance, coordination, kinesthetic sense, posture, motor skill, proprioception of   [] LE: core, proximal hip and LE for self care, mobility, lifting, and ambulation/stair navigation    [] UE / Cervical: cervical, postural,  scapular, scapulothoracic and UE control with self care, reaching, carrying, lifting, house/yardwork, driving, computer work    Manual Treatments:  PROM / STM / Oscillations-Mobs:  G-I, II, III, IV (PA's, Inf., Post.)  [] (06041) Provided manual therapy to mobilize LE, proximal hip and/or LS spine soft tissue/joints for the purpose of modulating pain, promoting relaxation,  increasing ROM, reducing/eliminating soft tissue swelling/inflammation/restriction, improving soft tissue extensibility and allowing for proper ROM for normal function with   [] LE / lumbar: self care, mobility, lifting and ambulation. [] UE / Cervical: self care, reaching, carrying, lifting, house/yardwork, driving, computer work.      Modalities:  [] (13181) Vasopneumatic compression: Utilized vasopneumatic compression to decrease edema / swelling for the purpose of improving mobility and quad tone / recruitment which will allow for increased overall function including but not limited to self-care, transfers, ambulation, and ascending / descending stairs. Charges:  Timed Code Treatment Minutes: 45   Total Treatment Minutes: 45     [] EVAL - LOW (06788)   [] EVAL - MOD (19491)  [] EVAL - HIGH (62735)  [] RE-EVAL (39889)   [x] GN(84958) x 1       [] Ionto  [x] NMR (71566) x 1      [] Vaso  [] Manual (82891) x 1      [] Ultrasound  [x] TA x   1     [] Mech Traction (00253)  [] Aquatic Therapy x      [] ES (un) (48011):   [] Home Management Training x  [] ES(attended) (31099)   [] Dry Needling 1-2 muscles (37005):  [] Dry Needling 3+ muscles (799619)  [] Group:      [] Other:     GOALS:  Patient stated goal: get stronger and get better  []? Progressing: []? Met: []? Not Met: []? Adjusted     Therapist goals for Patient:  Get stronger and get better   Short Term Goals: To be achieved in: 2 weeks  1. Independent in HEP and progression per patient tolerance, in order to prevent re-injury. []? Progressing: [x]? Met: []? Not Met: []? Adjusted  2. Patient will have a decrease in pain to facilitate improvement in movement, function, and ADLs as indicated by Functional Deficits. []? Progressing: [x]? Met: []? Not Met: []? Adjusted     Long Term Goals: To be achieved in: 4 weeks  1. Disability index score of 10% or less for the MIKAELA to assist with reaching prior level of function. []? Progressing: [x]? Met: []? Not Met: []? Adjusted  2. Patient will demonstrate increased AROM to WNL lumbar spine, good LS mobility, good hip ROM to allow for proper joint functioning as indicated by patients Functional Deficits. []? Progressing: [x]? Met: []? Not Met: []? Adjusted  3. Patient will demonstrate an increase in Strength to 4+/5 bilateral hips with good proximal hip and core activation to allow for proper functional mobility as indicated by patients Functional Deficits. []? Progressing: [x]? Met: []? Not Met: []? Adjusted  4.  Patient will return to functional activities including sleeping through the night without increased symptoms or restriction. []? Progressing: [x]? Met: []? Not Met: []? Adjusted  5. Pt will be able to walk for 1.5 miles without increased symptoms or restriction in order to resume PLOF for community walks  []? Progressing: [x]? Met: []? Not Met: []? Adjusted         Overall Progression Towards Functional goals/ Treatment Progress Update:  [x] Patient is progressing as expected towards functional goals listed. [] Progression is slowed due to complexities/Impairments listed. [] Progression has been slowed due to co-morbidities. [] Plan just implemented, too soon to assess goals progression <30days   [] Goals require adjustment due to lack of progress  [] Patient is not progressing as expected and requires additional follow up with physician  [] Other    Persisting Functional Limitations/Impairments:  []Sleeping []Sitting               []Standing []Transfers        []Walking []Kneeling               []Stairs []Squatting / bending   []ADLs []Reaching  []Lifting  []Housework  []Driving []Job related tasks  []Sports/Recreation []Other:        ASSESSMENT:  Pt has received 8 skilled PT visits to help with chronic low back pain. She has met all set goals, and has resumed 100% of her normal daily function. She is IND with her HEP, including a demonstration/discussion of a [employer] routine. She was given a 30 day pass to the gym, and then will continue with her [employer]. Will D/C and f/u prn     Treatment/Activity Tolerance:  [x] Patient able to complete tx  [] Patient limited by fatigue  [] Patient limited by pain  [] Patient limited by other medical complications  [] Other:     Prognosis: [x] Good [] Fair  [] Poor    Patient Requires Follow-up: [] Yes  [x] No     Plan for next treatment session:  Manual, P/A glides, mobilization as needed, core/hip strength. Progress strength after immobility/sedentary lifestyle from COVID and post THR in 2019.      PLAN: See zack. PT 2x / week for 4

## 2021-07-13 ENCOUNTER — OFFICE VISIT (OUTPATIENT)
Dept: ENT CLINIC | Age: 76
End: 2021-07-13
Payer: MEDICARE

## 2021-07-13 VITALS
BODY MASS INDEX: 21.26 KG/M2 | HEART RATE: 70 BPM | SYSTOLIC BLOOD PRESSURE: 128 MMHG | TEMPERATURE: 98.1 F | WEIGHT: 120 LBS | DIASTOLIC BLOOD PRESSURE: 73 MMHG

## 2021-07-13 DIAGNOSIS — H90.12 CONDUCTIVE HEARING LOSS OF LEFT EAR WITH UNRESTRICTED HEARING OF RIGHT EAR: ICD-10-CM

## 2021-07-13 DIAGNOSIS — H61.23 BILATERAL IMPACTED CERUMEN: Primary | ICD-10-CM

## 2021-07-13 DIAGNOSIS — H90.42 SENSORINEURAL HEARING LOSS (SNHL) OF LEFT EAR WITH UNRESTRICTED HEARING OF RIGHT EAR: Chronic | ICD-10-CM

## 2021-07-13 PROCEDURE — 99213 OFFICE O/P EST LOW 20 MIN: CPT | Performed by: OTOLARYNGOLOGY

## 2021-07-13 PROCEDURE — 69210 REMOVE IMPACTED EAR WAX UNI: CPT | Performed by: OTOLARYNGOLOGY

## 2021-07-13 NOTE — PROGRESS NOTES
Chief Complaint   Patient presents with    Cerumen Impaction     both ears    Hearing Loss     left ear       HISTORY:    Lima Krishna stated that the hearing is decreased in the left ear, which is plugged up with ear wax. EXAMINATION:    Both external ear canals were occluded by cerumen impaction, 90% on the left and partial on the right, obscuring visualization of the left TM. PROCEDURE - REMOVAL OF BILATERAL CERUMEN IMPACTION:   The cerumen impaction was removed from both of the EACs, under otomicroscopy visualization, with instrumentation, using a Billeau wire loop. After successful cerumen removal, the EACs appeared to be normal and clear bilaterally without mass, exudate, or edema. The tympanic membranes appeared to be normal.  There was no evidence of acute disease. Lima Krishna reported improved hearing in the left ear, back to usual normal level, after cerumen removal.          HEARING ASSESSMENT:  Finger rub:  Able to hear finger rub bilaterally. Tuning fork tests, [address] tuning fork:  Smith was heard in the right ear. Rinne air > bone bilaterally. IMPRESSION / Levonia Morgan / [facility] / PROCEDURES:       Lima Krishna was seen today for cerumen impaction and hearing loss. Diagnoses and all orders for this visit:    Bilateral impacted cerumen    Conductive hearing loss of left ear with unrestricted hearing of right ear    Sensorineural hearing loss (SNHL) of left ear with unrestricted hearing of right ear  Comments:  persistent after ear cleaning  Orders:  -     Federico Wills AU. D., Audiology, [facility]           RECOMMENDATIONS / PLAN:   1. Audiogram (routine, non-urgent). 2. See Patient Instructions on file for this visit. 3. Return for recurrence of symptoms of excessive ear wax, or any further ear nose throat or sinus problems.

## 2021-07-13 NOTE — PATIENT INSTRUCTIONS
1. Please schedule an audiogram (hearing test). 2. You may use an over the counter ear wax removal kit (such as Murine, Bausch and Lomb, NeilMed, or Debrox wax removal system) for ear wax removal, as needed. 3. It may help to use Debrox (OTC) for 4 days prior to future visits for ear cleaning. This may soften your ear wax and facilitate removal of the wax. NO Q-TIPS OR OTHER INSTRUMENTS/OBJECTS IN THE EARS   You should never clean your ears with a Q-tip, cotton tipped applicator, Jesenia pin, paper clip, safety pin, pen cap, or any other instrument. This will tend to push wax in deeper and pack the ear canal with wax. There is a high risk and danger of this practice, especially rupture of ear drum, dislocation or other damage to ossicles, and permanent, irreversible, and irreparable hearing loss. It may cause inflammation and irritation of the ear canal and cause itching or pain. I recommend only use of one the several ear wax removal kits available \"over the counter\" if you feel a need to try to remove ear wax. For example, Murine, Bausch and Lomb, NeilMed, or Debrox ear wax removal kits may be used for ear wax removal, as needed. No other methods should be self used for cleaning your ears.

## 2021-07-28 ENCOUNTER — OFFICE VISIT (OUTPATIENT)
Dept: FAMILY MEDICINE CLINIC | Age: 76
End: 2021-07-28
Payer: MEDICARE

## 2021-07-28 VITALS
RESPIRATION RATE: 16 BRPM | WEIGHT: 119 LBS | HEART RATE: 60 BPM | HEIGHT: 63 IN | SYSTOLIC BLOOD PRESSURE: 112 MMHG | DIASTOLIC BLOOD PRESSURE: 62 MMHG | BODY MASS INDEX: 21.09 KG/M2

## 2021-07-28 DIAGNOSIS — G89.29 CHRONIC RIGHT-SIDED LOW BACK PAIN WITHOUT SCIATICA: ICD-10-CM

## 2021-07-28 DIAGNOSIS — E55.9 VITAMIN D DEFICIENCY: ICD-10-CM

## 2021-07-28 DIAGNOSIS — M54.50 CHRONIC RIGHT-SIDED LOW BACK PAIN WITHOUT SCIATICA: ICD-10-CM

## 2021-07-28 DIAGNOSIS — M81.6 LOCALIZED OSTEOPOROSIS WITHOUT CURRENT PATHOLOGICAL FRACTURE: ICD-10-CM

## 2021-07-28 DIAGNOSIS — R73.9 HYPERGLYCEMIA: ICD-10-CM

## 2021-07-28 DIAGNOSIS — R39.11 URINARY HESITANCY: ICD-10-CM

## 2021-07-28 DIAGNOSIS — E03.4 HYPOTHYROIDISM DUE TO ACQUIRED ATROPHY OF THYROID: Primary | ICD-10-CM

## 2021-07-28 DIAGNOSIS — A60.09 HERPES GENITALIS IN WOMEN: ICD-10-CM

## 2021-07-28 LAB
A/G RATIO: 2 (ref 1.1–2.2)
ALBUMIN SERPL-MCNC: 4.6 G/DL (ref 3.4–5)
ALP BLD-CCNC: 87 U/L (ref 40–129)
ALT SERPL-CCNC: 13 U/L (ref 10–40)
ANION GAP SERPL CALCULATED.3IONS-SCNC: 10 MMOL/L (ref 3–16)
AST SERPL-CCNC: 19 U/L (ref 15–37)
BILIRUB SERPL-MCNC: 0.7 MG/DL (ref 0–1)
BILIRUBIN, POC: NORMAL
BLOOD URINE, POC: NORMAL
BUN BLDV-MCNC: 9 MG/DL (ref 7–20)
CALCIUM SERPL-MCNC: 9.7 MG/DL (ref 8.3–10.6)
CHLORIDE BLD-SCNC: 100 MMOL/L (ref 99–110)
CLARITY, POC: CLEAR
CO2: 25 MMOL/L (ref 21–32)
COLOR, POC: YELLOW
CREAT SERPL-MCNC: 0.6 MG/DL (ref 0.6–1.2)
GFR AFRICAN AMERICAN: >60
GFR NON-AFRICAN AMERICAN: >60
GLOBULIN: 2.3 G/DL
GLUCOSE BLD-MCNC: 105 MG/DL (ref 70–99)
GLUCOSE URINE, POC: NORMAL
KETONES, POC: NORMAL
LEUKOCYTE EST, POC: NORMAL
NITRITE, POC: NORMAL
PH, POC: 7
POTASSIUM SERPL-SCNC: 4.6 MMOL/L (ref 3.5–5.1)
PROTEIN, POC: NORMAL
SODIUM BLD-SCNC: 135 MMOL/L (ref 136–145)
SPECIFIC GRAVITY, POC: 1.02
T4 FREE: 1.2 NG/DL (ref 0.9–1.8)
TOTAL PROTEIN: 6.9 G/DL (ref 6.4–8.2)
TSH SERPL DL<=0.05 MIU/L-ACNC: 0.82 UIU/ML (ref 0.27–4.2)
UROBILINOGEN, POC: 0.2
VITAMIN D 25-HYDROXY: 58.5 NG/ML

## 2021-07-28 PROCEDURE — 1090F PRES/ABSN URINE INCON ASSESS: CPT | Performed by: FAMILY MEDICINE

## 2021-07-28 PROCEDURE — 81002 URINALYSIS NONAUTO W/O SCOPE: CPT | Performed by: FAMILY MEDICINE

## 2021-07-28 PROCEDURE — G8399 PT W/DXA RESULTS DOCUMENT: HCPCS | Performed by: FAMILY MEDICINE

## 2021-07-28 PROCEDURE — 1123F ACP DISCUSS/DSCN MKR DOCD: CPT | Performed by: FAMILY MEDICINE

## 2021-07-28 PROCEDURE — G8427 DOCREV CUR MEDS BY ELIG CLIN: HCPCS | Performed by: FAMILY MEDICINE

## 2021-07-28 PROCEDURE — G8420 CALC BMI NORM PARAMETERS: HCPCS | Performed by: FAMILY MEDICINE

## 2021-07-28 PROCEDURE — 4040F PNEUMOC VAC/ADMIN/RCVD: CPT | Performed by: FAMILY MEDICINE

## 2021-07-28 PROCEDURE — 36415 COLL VENOUS BLD VENIPUNCTURE: CPT | Performed by: FAMILY MEDICINE

## 2021-07-28 PROCEDURE — 1036F TOBACCO NON-USER: CPT | Performed by: FAMILY MEDICINE

## 2021-07-28 PROCEDURE — 99214 OFFICE O/P EST MOD 30 MIN: CPT | Performed by: FAMILY MEDICINE

## 2021-07-28 RX ORDER — LEVOTHYROXINE AND LIOTHYRONINE 57; 13.5 UG/1; UG/1
TABLET ORAL
Qty: 30 TABLET | Refills: 11 | Status: SHIPPED | OUTPATIENT
Start: 2021-07-28 | End: 2021-08-02

## 2021-07-28 RX ORDER — ACYCLOVIR 400 MG/1
400 TABLET ORAL DAILY
Qty: 90 TABLET | Refills: 3 | Status: SHIPPED | OUTPATIENT
Start: 2021-07-28 | End: 2022-02-02 | Stop reason: SDUPTHER

## 2021-07-28 RX ORDER — TERIPARATIDE 250 UG/ML
20 INJECTION, SOLUTION SUBCUTANEOUS DAILY
Qty: 2.24 ML | Refills: 11 | Status: SHIPPED | OUTPATIENT
Start: 2021-07-28 | End: 2021-08-16

## 2021-07-28 SDOH — ECONOMIC STABILITY: TRANSPORTATION INSECURITY
IN THE PAST 12 MONTHS, HAS LACK OF TRANSPORTATION KEPT YOU FROM MEETINGS, WORK, OR FROM GETTING THINGS NEEDED FOR DAILY LIVING?: NO

## 2021-07-28 SDOH — ECONOMIC STABILITY: FOOD INSECURITY: WITHIN THE PAST 12 MONTHS, YOU WORRIED THAT YOUR FOOD WOULD RUN OUT BEFORE YOU GOT MONEY TO BUY MORE.: NEVER TRUE

## 2021-07-28 SDOH — ECONOMIC STABILITY: TRANSPORTATION INSECURITY
IN THE PAST 12 MONTHS, HAS THE LACK OF TRANSPORTATION KEPT YOU FROM MEDICAL APPOINTMENTS OR FROM GETTING MEDICATIONS?: NO

## 2021-07-28 SDOH — ECONOMIC STABILITY: FOOD INSECURITY: WITHIN THE PAST 12 MONTHS, THE FOOD YOU BOUGHT JUST DIDN'T LAST AND YOU DIDN'T HAVE MONEY TO GET MORE.: NEVER TRUE

## 2021-07-28 ASSESSMENT — PATIENT HEALTH QUESTIONNAIRE - PHQ9
SUM OF ALL RESPONSES TO PHQ9 QUESTIONS 1 & 2: 0
SUM OF ALL RESPONSES TO PHQ QUESTIONS 1-9: 0
1. LITTLE INTEREST OR PLEASURE IN DOING THINGS: 0
SUM OF ALL RESPONSES TO PHQ QUESTIONS 1-9: 0
2. FEELING DOWN, DEPRESSED OR HOPELESS: 0
SUM OF ALL RESPONSES TO PHQ QUESTIONS 1-9: 0

## 2021-07-28 ASSESSMENT — SOCIAL DETERMINANTS OF HEALTH (SDOH): HOW HARD IS IT FOR YOU TO PAY FOR THE VERY BASICS LIKE FOOD, HOUSING, MEDICAL CARE, AND HEATING?: NOT HARD AT ALL

## 2021-07-28 NOTE — PROGRESS NOTES
[facility] [address] Note    Date: 7/28/2021                                               Subjective:     Chief Complaint   Patient presents with    Hypothyroidism     HYPOTHYROID ROUTINE FOLLOW UP        HPI  Needs refill zovirax for hsv prevention  Needs refill on thyroid  Hx of hep B  Had ent visit for ear wax recently  [facility] w/ friend through covid but not doing regular exercise  Back getting worse - right to middle low back - just finished PT which helped  Seeing specialist  Had lifeline screen recently - lipids were great - hdl 75, tg 53, ldl 78  Off red yeast rice 2/2 skin reaction  Had pap in last year  Sees derm periodically  On various supplements including glucosoamine/ chondroitin/ msm helps pain in joints  No flares in hsv for years. No low thyroid sxs - energy okay  occ takes longer to get urine out  Drinks good water.     Wt Readings from Last 3 Encounters:   07/28/21 119 lb (54 kg)   07/13/21 120 lb (54.4 kg)   01/13/21 120 lb (54.4 kg)     BP Readings from Last 3 Encounters:   07/28/21 112/62   07/13/21 128/73   01/13/21 134/80     Pulse Readings from Last 3 Encounters:   07/28/21 60   07/13/21 70   01/13/21 92              Patient Active Problem List    Diagnosis Date Noted    Abrasion of left ear 04/22/2019    Osteoporosis 12/09/2015    Colon polyp 12/03/2014    Hypercholesteremia 12/03/2014    Vitamin D deficiency 12/03/2014    Impacted cerumen of left ear 02/12/2014    Carotid artery plaque 09/10/2013    Allergic rhinitis 05/30/2013    Hepatitis B     HPV in female     Basal cell carcinoma     Herpes genitalis in women 11/29/2012    Hemorrhoid 08/30/2010    Hypothyroid 08/30/2010     Past Medical History:   Diagnosis Date    Basal cell carcinoma     x 5    Bloating     Blood transfusion     age 26    Candida infection     stomach    Colon polyps     Dry eyes     9/16    Hepatitis B     age 26    Herpes genitalis in women 11/29/2012    HPV in female 2010 DR Héctor Bazan FOLLOWS PT    Hypothyroidism     Osteoporosis     Thyroid disease     hypo     Past Surgical History:   Procedure Laterality Date    BREAST BIOPSY  2002    benign (quit ERT)    CHOLECYSTECTOMY, LAPAROSCOPIC  9/4/2003    COLONOSCOPY N/A 10/10/2019    COLONOSCOPY POLYPECTOMY SNARE/COLD BIOPSY performed by Kaitlin Saunders MD at [facility]  8-28-10    hemorhoidectomy   [address]    no BSO    JOINT REPLACEMENT  2009    left total hip for fracture    JOINT REPLACEMENT  2019    right hip replacement    ROTATOR CUFF REPAIR  5/17/2005    right    SKIN CANCER EXCISION  [id number]    BASAL CELL REMOVAL LEFT LEG     Orders Only on 01/05/2021   Component Date Value Ref Range Status    Trichomonas Vaginalis DNA 01/05/2021    Final                    Value:Negative DNA not detected. Normal range: Negative DNA not detected.  GARDNERELLA VAGINALIS, DNA PROBE 01/05/2021    Final                    Value:Negative DNA not detected. Normal range: Negative DNA not detected.  CHAO SPECIES, DNA PROBE 01/05/2021    Final                    Value:Negative DNA not detected. Normal range: Negative DNA not detected. Family History   Problem Relation Age of Onset    Cancer Father         spine    Cancer Sister         pancreas    Substance Abuse Brother         alcohol    Liver Disease Brother         cirrhosis    Alzheimer's Disease Mother      Current Outpatient Medications   Medication Sig Dispense Refill    acyclovir (ZOVIRAX) 400 MG tablet Take 1 tablet by mouth daily 90 tablet 3    NONFORMULARY OSTEO BASE      thyroid (ARMOUR THYROID) 90 MG tablet TAKE ONE TABLET BY MOUTH EVERY OTHER DAY 30 tablet 11    MISC NATURAL PRODUCTS PO Take by mouth ostebase (Ca+Vit D +Vit.  K+ mg+)      MISC NATURAL PRODUCTS PO Take 1 capsule by mouth 3 times daily ALPHA BASE CAPS WITHOUT IRON      MISC NATURAL PRODUCTS PO Take 1 tablet by mouth daily 4SIGHT      MISC NATURAL PRODUCTS PO Take 2 tablets by mouth daily COSMEDIX      MISC NATURAL PRODUCTS PO Take 1 tablet by mouth daily ORTHO DIGESTZYME      MISC NATURAL PRODUCTS PO Take 1 tablet by mouth daily garlic      Omega-3 Fatty Acids (FISH OIL) 1200 MG CAPS Take by mouth      Misc Natural Products CAPS Take 1 capsule by mouth daily. ADRENAL CAPS      Vitamin E 100 UNITS TABS Take 1 tablet by mouth daily        No current facility-administered medications for this visit. Allergies   Allergen Reactions    Alendronate      Stomach upset    Doxycycline Other (See Comments)     Abdominal pain    Penicillins Hives    Sulfa Antibiotics     Zithromax [Azithromycin]        Review of Systems    Objective: There were no vitals taken for this visit. BP Readings from Last 3 Encounters:   07/13/21 128/73   01/13/21 134/80   01/04/21 128/76       Pulse Readings from Last 3 Encounters:   07/13/21 70   01/13/21 92   01/04/21 70       Wt Readings from Last 3 Encounters:   07/13/21 120 lb (54.4 kg)   01/13/21 120 lb (54.4 kg)   01/04/21 120 lb 12.8 oz (54.8 kg)       Physical Exam  Constitutional:       General: She is not in acute distress. Appearance: She is well-developed. HENT:      Head: Normocephalic and atraumatic. Mouth/Throat:      Pharynx: No oropharyngeal exudate. Eyes:      General: No scleral icterus. Conjunctiva/sclera: Conjunctivae normal.   Neck:      Thyroid: No thyromegaly. Cardiovascular:      Rate and Rhythm: Normal rate and regular rhythm. Heart sounds: Normal heart sounds. No murmur heard. Pulmonary:      Effort: Pulmonary effort is normal. No respiratory distress. Breath sounds: Normal breath sounds. No wheezing or rales. Abdominal:      General: Bowel sounds are normal. There is no distension. Palpations: Abdomen is soft. Tenderness: There is no abdominal tenderness. Musculoskeletal:         General: No swelling.    Lymphadenopathy:      Cervical: No cervical adenopathy. Skin:     General: Skin is warm and dry. Neurological:      Mental Status: She is alert and oriented to person, place, and time. Assessment/Plan:      Diagnosis Orders   1. Hypothyroidism due to acquired atrophy of thyroid  Comprehensive Metabolic Panel    T4, Free    TSH without Reflex   2. Herpes genitalis in women     3. Hyperglycemia  Comprehensive Metabolic Panel   4. Chronic right-sided low back pain without sciatica     5. Urinary hesitancy     6. Localized osteoporosis without current pathological fracture     7.  Vitamin D deficiency       Cont vit d/ ca - unable to take bisphosphonates/ prolia per pt's dentist  Consider forteo/ tymlos dw/ pt  Refill zovirax for hsv prevention - working well/ tolerated well  Refill thyroid pending labs  Hold on lipid test as really good on recent check  utd on mmg/ pap  Colonoscopy utd  shingrix d/w pt - o/w utd on vaccines  Check ua - monitor hesitancy sxs - urology if worsening  F/u 6 months/ prn  Eugenie Lovelace MD, MD  7/28/2021  9:22 AM

## 2021-07-29 LAB
ESTIMATED AVERAGE GLUCOSE: 114 MG/DL
HBA1C MFR BLD: 5.6 %

## 2021-07-30 ENCOUNTER — PROCEDURE VISIT (OUTPATIENT)
Dept: AUDIOLOGY | Age: 76
End: 2021-07-30
Payer: MEDICARE

## 2021-07-30 DIAGNOSIS — H90.3 SENSORINEURAL HEARING LOSS (SNHL) OF BOTH EARS: Primary | ICD-10-CM

## 2021-07-30 PROCEDURE — 92567 TYMPANOMETRY: CPT | Performed by: AUDIOLOGIST

## 2021-07-30 PROCEDURE — 92557 COMPREHENSIVE HEARING TEST: CPT | Performed by: AUDIOLOGIST

## 2021-08-03 ENCOUNTER — TELEPHONE (OUTPATIENT)
Dept: FAMILY MEDICINE CLINIC | Age: 76
End: 2021-08-03

## 2021-08-03 NOTE — TELEPHONE ENCOUNTER
----- Message from Barrington Davey sent at 8/3/2021 10:19 AM EDT -----  Subject: Results Request    QUESTIONS  Which lab or imaging result is the patient calling about? lab work  Which provider ordered the test? Eugenie Lovelace   At what location was the test performed? Date the test was performed? 2021-07-28  Additional Information for Provider? Patient would like a call back with   her results  ---------------------------------------------------------------------------  --------------  [address]  What is the best way for the office to contact you? OK to leave message on   voicemail  Preferred Call Back Phone Number?  [phone] Yes

## 2021-08-16 ENCOUNTER — TELEPHONE (OUTPATIENT)
Dept: FAMILY MEDICINE CLINIC | Age: 76
End: 2021-08-16

## 2021-08-16 ENCOUNTER — OFFICE VISIT (OUTPATIENT)
Dept: FAMILY MEDICINE CLINIC | Age: 76
End: 2021-08-16
Payer: MEDICARE

## 2021-08-16 VITALS
TEMPERATURE: 97.7 F | HEART RATE: 66 BPM | HEIGHT: 63 IN | OXYGEN SATURATION: 98 % | BODY MASS INDEX: 21.79 KG/M2 | SYSTOLIC BLOOD PRESSURE: 126 MMHG | DIASTOLIC BLOOD PRESSURE: 72 MMHG | WEIGHT: 123 LBS

## 2021-08-16 DIAGNOSIS — T54.2X1A ACID BURN: Primary | ICD-10-CM

## 2021-08-16 DIAGNOSIS — T30.4 ACID BURN: Primary | ICD-10-CM

## 2021-08-16 DIAGNOSIS — M25.512 ACUTE PAIN OF LEFT SHOULDER: ICD-10-CM

## 2021-08-16 PROCEDURE — 1090F PRES/ABSN URINE INCON ASSESS: CPT | Performed by: NURSE PRACTITIONER

## 2021-08-16 PROCEDURE — 1123F ACP DISCUSS/DSCN MKR DOCD: CPT | Performed by: NURSE PRACTITIONER

## 2021-08-16 PROCEDURE — 1036F TOBACCO NON-USER: CPT | Performed by: NURSE PRACTITIONER

## 2021-08-16 PROCEDURE — 99213 OFFICE O/P EST LOW 20 MIN: CPT | Performed by: NURSE PRACTITIONER

## 2021-08-16 PROCEDURE — G8420 CALC BMI NORM PARAMETERS: HCPCS | Performed by: NURSE PRACTITIONER

## 2021-08-16 PROCEDURE — G8399 PT W/DXA RESULTS DOCUMENT: HCPCS | Performed by: NURSE PRACTITIONER

## 2021-08-16 PROCEDURE — G8427 DOCREV CUR MEDS BY ELIG CLIN: HCPCS | Performed by: NURSE PRACTITIONER

## 2021-08-16 PROCEDURE — 4040F PNEUMOC VAC/ADMIN/RCVD: CPT | Performed by: NURSE PRACTITIONER

## 2021-08-16 ASSESSMENT — ENCOUNTER SYMPTOMS
SHORTNESS OF BREATH: 0
WHEEZING: 0

## 2021-08-16 NOTE — PATIENT INSTRUCTIONS
Rinse hand multiple times a day. Apply over-the-counter burn cream twice a day as needed. Continue to monitor the skin. Follow-up with any changing of color or sloughing of the skin.
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
## 2021-08-19 ENCOUNTER — TELEPHONE (OUTPATIENT)
Dept: FAMILY MEDICINE CLINIC | Age: 76
End: 2021-08-19
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
## 2021-08-19 NOTE — TELEPHONE ENCOUNTER
It is going to take time for it to feel better. There is not anything additional we can do at this time.

## 2021-08-27 ENCOUNTER — TELEPHONE (OUTPATIENT)
Dept: FAMILY MEDICINE CLINIC | Age: 76
End: 2021-08-27

## 2021-08-27 DIAGNOSIS — T54.2X1A ACID BURN: Primary | ICD-10-CM

## 2021-08-27 DIAGNOSIS — T30.4 ACID BURN: Primary | ICD-10-CM

## 2021-08-27 NOTE — TELEPHONE ENCOUNTER
Sending kenalog to the pharmacy. Hopefully this will decrease irritation. Twice daily for 7-14 days.
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
## 2021-09-13 ENCOUNTER — TELEPHONE (OUTPATIENT)
Dept: FAMILY MEDICINE CLINIC | Age: 76
End: 2021-09-13

## 2021-09-13 ENCOUNTER — NURSE TRIAGE (OUTPATIENT)
Dept: OTHER | Facility: CLINIC | Age: 76
End: 2021-09-13
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
## 2021-09-14 NOTE — TELEPHONE ENCOUNTER
TRIED CALLING HOME # AND SAME THING HAPPENED, IT RANG AND THEN WENT TO A FAX TONE/BEEP.  I TRIED MOBILE # LISTED AND IT WENT STRAIGHT TO  SO I LEFT A MSG TO CALL BACK

## 2021-09-17 ENCOUNTER — OFFICE VISIT (OUTPATIENT)
Dept: FAMILY MEDICINE CLINIC | Age: 76
End: 2021-09-17
Payer: MEDICARE

## 2021-09-17 VITALS
DIASTOLIC BLOOD PRESSURE: 66 MMHG | HEART RATE: 64 BPM | SYSTOLIC BLOOD PRESSURE: 110 MMHG | BODY MASS INDEX: 21.26 KG/M2 | RESPIRATION RATE: 14 BRPM | OXYGEN SATURATION: 99 % | HEIGHT: 63 IN | WEIGHT: 120 LBS

## 2021-09-17 DIAGNOSIS — S09.90XA TRAUMATIC INJURY OF HEAD, INITIAL ENCOUNTER: ICD-10-CM

## 2021-09-17 DIAGNOSIS — Z23 NEED FOR INFLUENZA VACCINATION: ICD-10-CM
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
## 2021-09-17 PROCEDURE — G8399 PT W/DXA RESULTS DOCUMENT: HCPCS | Performed by: NURSE PRACTITIONER

## 2021-09-17 PROCEDURE — 99214 OFFICE O/P EST MOD 30 MIN: CPT | Performed by: NURSE PRACTITIONER

## 2021-09-17 PROCEDURE — G8427 DOCREV CUR MEDS BY ELIG CLIN: HCPCS | Performed by: NURSE PRACTITIONER

## 2021-09-17 PROCEDURE — 90694 VACC AIIV4 NO PRSRV 0.5ML IM: CPT | Performed by: NURSE PRACTITIONER

## 2021-09-17 PROCEDURE — 1090F PRES/ABSN URINE INCON ASSESS: CPT | Performed by: NURSE PRACTITIONER

## 2021-09-17 PROCEDURE — G0008 ADMIN INFLUENZA VIRUS VAC: HCPCS | Performed by: NURSE PRACTITIONER

## 2021-09-17 PROCEDURE — 1036F TOBACCO NON-USER: CPT | Performed by: NURSE PRACTITIONER

## 2021-09-17 PROCEDURE — 4040F PNEUMOC VAC/ADMIN/RCVD: CPT | Performed by: NURSE PRACTITIONER

## 2021-09-17 PROCEDURE — 1123F ACP DISCUSS/DSCN MKR DOCD: CPT | Performed by: NURSE PRACTITIONER

## 2021-09-17 PROCEDURE — G8420 CALC BMI NORM PARAMETERS: HCPCS | Performed by: NURSE PRACTITIONER

## 2021-09-17 ASSESSMENT — ENCOUNTER SYMPTOMS
ABDOMINAL PAIN: 0
EYE DISCHARGE: 0
SINUS PAIN: 0
SINUS PRESSURE: 0
NAUSEA: 0
COLOR CHANGE: 0
DIARRHEA: 0
CONSTIPATION: 0
CHEST TIGHTNESS: 0
SHORTNESS OF BREATH: 0
COUGH: 0
ABDOMINAL DISTENTION: 0
BACK PAIN: 0

## 2021-09-17 NOTE — PATIENT INSTRUCTIONS
Call [address] [place] [address] to schedule CT scan of head    Call insurance to discuss coverage of CT head    Ice twice daily    Patient Education        Influenza (Flu) Vaccine (Inactivated or Recombinant): What You Need to Know  Why get vaccinated? Influenza vaccine can prevent influenza (flu). Flu is a contagious disease that spreads around the [facility] every year, usually between October and May. Anyone can get the flu, but it is more dangerous for some people. Infants and young children, people 72years of age and older, pregnant women, and people with certain health conditions or a weakened immune system are at greatest risk of flu complications. Pneumonia, bronchitis, sinus infections and ear infections are examples of flu-related complications. If you have a medical condition, such as heart disease, cancer or diabetes, flu can make it worse. Flu can cause fever and chills, sore throat, muscle aches, fatigue, cough, headache, and runny or stuffy nose. Some people may have vomiting and diarrhea, though this is more common in children than adults. Each year, thousands of people in the [facility] die from flu, and many more are hospitalized. Flu vaccine prevents millions of illnesses and flu-related visits to the doctor each year. Influenza vaccine  CDC recommends everyone 10months of age and older get vaccinated every flu season. Children 6 months through 6years of age may need 2 doses during a single flu season. Everyone else needs only 1 dose each flu season. It takes about 2 weeks for protection to develop after vaccination. There are many flu viruses, and they are always changing. Each year a new flu vaccine is made to protect against three or four viruses that are likely to cause disease in the upcoming flu season. Even when the vaccine doesn't exactly match these viruses, it may still provide some protection. Influenza vaccine does not cause flu.   Influenza vaccine may be given at the same time as other vaccines. Talk with your health care provider  Tell your vaccine provider if the person getting the vaccine:  · Has had an allergic reaction after a previous dose of influenza vaccine, or has any severe, life-threatening allergies. · Has ever had Guillain-Barré Syndrome (also called GBS). In some cases, your health care provider may decide to postpone influenza vaccination to a future visit. People with minor illnesses, such as a cold, may be vaccinated. People who are moderately or severely ill should usually wait until they recover before getting influenza vaccine. Your health care provider can give you more information. Risks of a vaccine reaction  · Soreness, redness, and swelling where shot is given, fever, muscle aches, and headache can happen after influenza vaccine. · There may be a very small increased risk of Guillain-Barré Syndrome (GBS) after inactivated influenza vaccine (the flu shot). [employer] children who get the flu shot along with pneumococcal vaccine (PCV13), and/or DTaP vaccine at the same time might be slightly more likely to have a seizure caused by fever. Tell your health care provider if a child who is getting flu vaccine has ever had a seizure. People sometimes faint after medical procedures, including vaccination. Tell your provider if you feel dizzy or have vision changes or ringing in the ears. As with any medicine, there is a very remote chance of a vaccine causing a severe allergic reaction, other serious injury, or death. What if there is a serious problem? An allergic reaction could occur after the vaccinated person leaves the clinic. If you see signs of a severe allergic reaction (hives, swelling of the face and throat, difficulty breathing, a fast heartbeat, dizziness, or weakness), call 9-1-1 and get the person to the nearest hospital.  For other signs that concern you, call your health care provider.   Adverse reactions should be reported to the Vaccine Adverse Event Reporting System (VAERS). Your health care provider will usually file this report, or you can do it yourself. Visit the VAERS website at www.vaers. hhs.gov or call 0-373.467.9113. VAERS is only for reporting reactions, and VAERS staff do not give medical advice. The National Vaccine Injury Compensation Program  The National Vaccine Injury Compensation Program (VICP) is a federal program that was created to compensate people who may have been injured by certain vaccines. Visit the VICP website at www.[facility].gov/vaccinecompensation or call 7-200.750.7899 to learn about the program and about filing a claim. There is a time limit to file a claim for compensation. How can I learn more? · Ask your healthcare provider. · Call your local or state health department. · Contact the Centers for Disease Control and Prevention (CDC):  ? Call 9-575.716.3839 (1-800-CDC-INFO) or  ? Visit CDC's website at www.cdc.gov/flu  Vaccine Information Statement (Interim)  Inactivated Influenza Vaccine  8/15/2019  42 MINNIE Mims [id number]-[id number]  Department of Health and Human Services  Centers for Disease Control and Prevention  Many Vaccine Information Statements are available in Indian and other languages. See www.immunize.org/vis. Muchas hojas de información sobre vacunas están disponibles en español y en otros idiomas. Visite www.immunize.org/vis. Care instructions adapted under license by [facility] ([facility]). If you have questions about a medical condition or this instruction, always ask your healthcare professional. [place] [address] any warranty or liability for your use of this information.
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
## 2021-09-20 ENCOUNTER — HOSPITAL ENCOUNTER (OUTPATIENT)
Dept: CT IMAGING | Age: 76
Discharge: HOME | End: 2021-09-20
Payer: MEDICARE
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
## 2021-09-20 DIAGNOSIS — S09.90XA TRAUMATIC INJURY OF HEAD, INITIAL ENCOUNTER: ICD-10-CM

## 2021-09-20 PROCEDURE — 70450 CT HEAD/BRAIN W/O DYE: CPT

## 2021-09-29 ENCOUNTER — OFFICE VISIT (OUTPATIENT)
Dept: FAMILY MEDICINE CLINIC | Age: 76
End: 2021-09-29
Payer: MEDICARE

## 2021-09-29 VITALS
WEIGHT: 118 LBS | OXYGEN SATURATION: 98 % | DIASTOLIC BLOOD PRESSURE: 68 MMHG | HEART RATE: 71 BPM | HEIGHT: 63 IN | SYSTOLIC BLOOD PRESSURE: 120 MMHG | BODY MASS INDEX: 20.91 KG/M2
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
## 2021-09-29 DIAGNOSIS — Z71.89 DNR (DO NOT RESUSCITATE) DISCUSSION: ICD-10-CM

## 2021-09-29 DIAGNOSIS — E03.4 HYPOTHYROIDISM DUE TO ACQUIRED ATROPHY OF THYROID: ICD-10-CM

## 2021-09-29 DIAGNOSIS — Z00.00 ROUTINE GENERAL MEDICAL EXAMINATION AT A HEALTH CARE FACILITY: Primary | ICD-10-CM

## 2021-09-29 DIAGNOSIS — E78.00 HYPERCHOLESTEREMIA: ICD-10-CM

## 2021-09-29 DIAGNOSIS — Z71.89 ACP (ADVANCE CARE PLANNING): ICD-10-CM

## 2021-09-29 PROCEDURE — G0439 PPPS, SUBSEQ VISIT: HCPCS | Performed by: NURSE PRACTITIONER

## 2021-09-29 PROCEDURE — 4040F PNEUMOC VAC/ADMIN/RCVD: CPT | Performed by: NURSE PRACTITIONER

## 2021-09-29 PROCEDURE — 1123F ACP DISCUSS/DSCN MKR DOCD: CPT | Performed by: NURSE PRACTITIONER

## 2021-09-29 PROCEDURE — 99497 ADVNCD CARE PLAN 30 MIN: CPT | Performed by: NURSE PRACTITIONER

## 2021-09-29 ASSESSMENT — LIFESTYLE VARIABLES
HOW OFTEN DURING THE LAST YEAR HAVE YOU FOUND THAT YOU WERE NOT ABLE TO STOP DRINKING ONCE YOU HAD STARTED: 0
HOW OFTEN DURING THE LAST YEAR HAVE YOU NEEDED AN ALCOHOLIC DRINK FIRST THING IN THE MORNING TO GET YOURSELF GOING AFTER A NIGHT OF HEAVY DRINKING: 0
AUDIT TOTAL SCORE: 4
HOW MANY STANDARD DRINKS CONTAINING ALCOHOL DO YOU HAVE ON A TYPICAL DAY: 0
HAVE YOU OR SOMEONE ELSE BEEN INJURED AS A RESULT OF YOUR DRINKING: 0
HOW OFTEN DO YOU HAVE SIX OR MORE DRINKS ON ONE OCCASION: 0
HOW OFTEN DURING THE LAST YEAR HAVE YOU BEEN UNABLE TO REMEMBER WHAT HAPPENED THE NIGHT BEFORE BECAUSE YOU HAD BEEN DRINKING: 0
HOW OFTEN DO YOU HAVE A DRINK CONTAINING ALCOHOL: 4
HAS A RELATIVE, FRIEND, DOCTOR, OR ANOTHER HEALTH PROFESSIONAL EXPRESSED CONCERN ABOUT YOUR DRINKING OR SUGGESTED YOU CUT DOWN: 0
HOW OFTEN DURING THE LAST YEAR HAVE YOU FAILED TO DO WHAT WAS NORMALLY EXPECTED FROM YOU BECAUSE OF DRINKING: 0
HOW OFTEN DURING THE LAST YEAR HAVE YOU HAD A FEELING OF GUILT OR REMORSE AFTER DRINKING: 0
AUDIT-C TOTAL SCORE: 4

## 2021-09-29 ASSESSMENT — PATIENT HEALTH QUESTIONNAIRE - PHQ9
SUM OF ALL RESPONSES TO PHQ9 QUESTIONS 1 & 2: 0
1. LITTLE INTEREST OR PLEASURE IN DOING THINGS: 0
SUM OF ALL RESPONSES TO PHQ QUESTIONS 1-9: 0
2. FEELING DOWN, DEPRESSED OR HOPELESS: 0
SUM OF ALL RESPONSES TO PHQ QUESTIONS 1-9: 0
SUM OF ALL RESPONSES TO PHQ QUESTIONS 1-9: 0

## 2021-09-29 ASSESSMENT — ENCOUNTER SYMPTOMS
COUGH: 0
SHORTNESS OF BREATH: 0
CHEST TIGHTNESS: 0
BACK PAIN: 0
SINUS PRESSURE: 0
SINUS PAIN: 0
ABDOMINAL DISTENTION: 0
DIARRHEA: 0
ABDOMINAL PAIN: 0
NAUSEA: 0
EYE DISCHARGE: 0
COLOR CHANGE: 0
CONSTIPATION: 0

## 2021-09-29 NOTE — PROGRESS NOTES
Medicare Annual Wellness Visit  Name: Adrián Wise Date: 2021   MRN: [id number] Sex: Female   Age: 67 y.o. Ethnicity: Non- / Non    : 1945 Race: White (non-)      Cj Jeronimo is here for Medicare AWV (MEDICARE AWV )    Screenings for behavioral, psychosocial and functional/safety risks, and cognitive dysfunction are all negative except as indicated below. These results, as well as other patient data from the [address] form, are documented in Flowsheets linked to this Encounter. Chief Complaint   Patient presents with    Medicare AWV     MEDICARE AWV          Allergies   Allergen Reactions    Alendronate      Stomach upset    Doxycycline Other (See Comments)     Abdominal pain    Penicillins Hives    Sulfa Antibiotics     Zithromax [Azithromycin]          Prior to Visit Medications    Medication Sig Taking? Authorizing Provider   thyroid (ARMOUR THYROID) 90 MG tablet TAKE ONE TABLET BY MOUTH EVERY OTHER DAY Yes Jenny Lane, APRN - CNP   acyclovir (ZOVIRAX) 400 MG tablet Take 1 tablet by mouth daily Yes Nubia Tovar MD   NONFORMULARY OSTEO BASE Yes Historical Provider, MD   MISC NATURAL PRODUCTS PO Take by mouth ostebase (Ca+Vit D +Vit. K+ mg+) Yes Historical Provider, MD   MISC NATURAL PRODUCTS PO Take 1 capsule by mouth 3 times daily ALPHA BASE CAPS WITHOUT IRON Yes Historical Provider, MD   MISC NATURAL PRODUCTS PO Take 1 tablet by mouth daily 4SIGHT Yes Historical Provider, MD   MISC NATURAL PRODUCTS PO Take 2 tablets by mouth daily COSMEDIX Yes Historical Provider, MD   MISC NATURAL PRODUCTS PO Take 1 tablet by mouth daily ORTHO DIGESTZYME Yes Historical Provider, MD   MISC NATURAL PRODUCTS PO Take 1 tablet by mouth daily garlic Yes Historical Provider, MD   Omega-3 Fatty Acids (FISH OIL) 1200 MG CAPS Take by mouth Yes Historical Provider, MD   Misc Natural Products CAPS Take 1 capsule by mouth daily.  ADRENAL CAPS Yes Historical Provider, MD Vitamin E 100 UNITS TABS Take 1 tablet by mouth daily  Yes Historical Provider, MD         Past Medical History:   Diagnosis Date    Basal cell carcinoma     x 5    Bloating     Blood transfusion     age 26    Candida infection     stomach    Colon polyps     Dry eyes     9/16    Hepatitis B     age 26    Herpes genitalis in women 11/29/2012    HPV in female 2010    DR CHAUHAN FOLLOWS PT    Hypothyroidism     Osteoporosis     Thyroid disease     hypo       Past Surgical History:   Procedure Laterality Date    BREAST BIOPSY  2002    benign (quit ERT)    CHOLECYSTECTOMY, LAPAROSCOPIC  9/4/2003    COLONOSCOPY N/A 10/10/2019    COLONOSCOPY POLYPECTOMY SNARE/COLD BIOPSY performed by Belinda Rush MD at [facility]  8-28-10    hemorhoidectomy   [address]    no BSO    JOINT REPLACEMENT  2009    left total hip for fracture    JOINT REPLACEMENT  2019    right hip replacement    ROTATOR CUFF REPAIR  5/17/2005    right    SKIN CANCER EXCISION  [id number]    BASAL CELL REMOVAL LEFT LEG         Family History   Problem Relation Age of Onset    Cancer Father         spine    Cancer Sister         pancreas    Substance Abuse Brother         alcohol    Liver Disease Brother         cirrhosis    Alzheimer's Disease Mother        CareTeam (Including outside providers/suppliers regularly involved in providing care):   Patient Care Team:  Reggie Méndez MD as PCP - General (Family Medicine)  Reggie Méndez MD as PCP - REHABILITATION [facility] Empaneled Provider  Delbert Coburn MD as Consulting Physician (Otolaryngology)  Inocente Pereyra as Consulting Physician (Dermatology)  Tyson Harden MD as Consulting Physician (Gynecology)  Iglesia Booker MD as Consulting Physician (Otolaryngology)    Wt Readings from Last 3 Encounters:   09/29/21 118 lb (53.5 kg)   09/17/21 120 lb (54.4 kg)   08/16/21 123 lb (55.8 kg)     Vitals:    09/29/21 1416   BP: 120/68   Site: Right Upper Arm   Position: Sitting   Cuff Size: Medium Adult   Pulse: 71   SpO2: 98%   Weight: 118 lb (53.5 kg)   Height: 5' 3\" (1.6 m)     Body mass index is 20.9 kg/m². Based upon direct observation of the patient, evaluation of cognition reveals recent and remote memory intact. Review of Systems   Constitutional: Negative for activity change, appetite change, fatigue, fever and unexpected weight change. HENT: Negative for congestion, ear pain, sinus pressure and sinus pain. Eyes: Negative for discharge and visual disturbance. Respiratory: Negative for cough, chest tightness and shortness of breath. Cardiovascular: Negative for chest pain, palpitations and leg swelling. Gastrointestinal: Negative for abdominal distention, abdominal pain, constipation, diarrhea and nausea. Endocrine: Negative for cold intolerance, heat intolerance, polydipsia, polyphagia and polyuria. Genitourinary: Negative for decreased urine volume, difficulty urinating, dysuria, flank pain, frequency and urgency. Musculoskeletal: Negative for arthralgias, back pain, gait problem, joint swelling, myalgias and neck pain. Skin: Negative for color change, rash and wound. Allergic/Immunologic: Negative for food allergies and immunocompromised state. Neurological: Negative for dizziness, tremors, speech difficulty, weakness, light-headedness, numbness and headaches. Hematological: Negative for adenopathy. Does not bruise/bleed easily. Psychiatric/Behavioral: Negative for confusion, decreased concentration, self-injury, sleep disturbance and suicidal ideas. The patient is not nervous/anxious. Patient's complete Health Risk Assessment and screening values have been reviewed and are found in Flowsheets. The following problems were reviewed today and where indicated follow up appointments were made and/or referrals ordered. Physical Exam  Vitals reviewed. Constitutional:       General: She is awake.       Appearance: Normal appearance. She is well-developed, well-groomed and normal weight. She is not ill-appearing. HENT:      Head: Normocephalic and atraumatic. Right Ear: Hearing, tympanic membrane, ear canal and external ear normal.      Left Ear: Hearing, tympanic membrane, ear canal and external ear normal.      Nose: Nose normal.      Mouth/Throat:      Lips: Pink. Mouth: Mucous membranes are moist.      Pharynx: Oropharynx is clear. Eyes:      General: Lids are normal.      Extraocular Movements: Extraocular movements intact. Conjunctiva/sclera: Conjunctivae normal.      Pupils: Pupils are equal, round, and reactive to light. Neck:      Thyroid: No thyromegaly. Vascular: No carotid bruit. Cardiovascular:      Rate and Rhythm: Normal rate. Pulses:           Carotid pulses are 2+ on the right side and 2+ on the left side. Radial pulses are 2+ on the right side and 2+ on the left side. Posterior tibial pulses are 2+ on the right side and 2+ on the left side. Heart sounds: Normal heart sounds, S1 normal and S2 normal. No murmur heard. Pulmonary:      Effort: Pulmonary effort is normal.      Breath sounds: Normal breath sounds. Abdominal:      General: Bowel sounds are normal. There is no abdominal bruit. Palpations: Abdomen is soft. Tenderness: There is no abdominal tenderness. Genitourinary:     Comments: Deferred  Musculoskeletal:         General: Normal range of motion. Cervical back: Full passive range of motion without pain, normal range of motion and neck supple. Right lower leg: No edema. Left lower leg: No edema. Lymphadenopathy:      Head:      Right side of head: No submental, submandibular, tonsillar, preauricular, posterior auricular or occipital adenopathy. Left side of head: No submental, submandibular, tonsillar, preauricular, posterior auricular or occipital adenopathy. Cervical: No cervical adenopathy.       Right cervical: No superficial, deep or posterior cervical adenopathy. Left cervical: No superficial, deep or posterior cervical adenopathy. Upper Body:      Right upper body: No supraclavicular adenopathy. Left upper body: No supraclavicular adenopathy. Skin:     General: Skin is warm and dry. Capillary Refill: Capillary refill takes less than 2 seconds. Neurological:      General: No focal deficit present. Mental Status: She is alert and oriented to person, place, and time. Mental status is at baseline. Sensory: Sensation is intact. Motor: Motor function is intact. Coordination: Coordination is intact. Gait: Gait is intact. Psychiatric:         Attention and Perception: Attention and perception normal.         Mood and Affect: Mood and affect normal.         Speech: Speech normal.         Behavior: Behavior normal. Behavior is cooperative. Thought Content:  Thought content normal.         Cognition and Memory: Cognition and memory normal.         Judgment: Judgment normal.           Positive Risk Factor Screenings with Interventions:     Fall Risk:  2 or more falls in past year?: no  Fall with injury in past year?: (!) yes  Fall Risk Interventions:    · Home safety tips provided           Safety:  Safety  Do you have working smoke detectors?: Yes  Have all throw rugs been removed or fastened?: (!) No  Do you have non-slip mats or surfaces in all bathtubs/showers?: Yes  Do all of your stairways have a railing or banister?: Yes  Are your doorways, halls and stairs free of clutter?: Yes  Do you always fasten your seatbelt when you are in a car?: Yes  Safety Interventions:  · Home safety tips provided     Personalized Preventive Plan   Current Health Maintenance Status  Immunization History   Administered Date(s) Administered    COVID-19, Moderna, PF, 100mcg/0.5mL 02/05/2021, 03/05/2021    Influenza Vaccine, unspecified formulation 09/23/2015    Influenza Whole 10/23/2009  Influenza, High Dose (Fluzone 65 yrs and older) 10/22/2013, 12/03/2014, 09/23/2015, 09/19/2016, 10/20/2017, 10/01/2018    Influenza, Quadv, adjuvanted, 65 yrs +, IM, PF (Fluad) 09/17/2021    Influenza, Triv, inactivated, subunit, adjuvanted, IM (Fluad 65 yrs and older) 10/17/2019, 10/07/2020    Pneumococcal Conjugate 13-valent ([id number]) 12/16/2015, 12/16/2015    Pneumococcal Conjugate 7-valent (Prevnar7) 10/23/2009    Pneumococcal Polysaccharide ([id number]) 10/23/2009, 07/24/2020    Tdap (Boostrix, Adacel) 05/11/2011, 09/29/2013    Zoster Live (Zostavax) 05/07/2014        Health Maintenance   Topic Date Due    Shingles Vaccine (2 of 3) 07/02/2014    Annual Wellness Visit (AWV)  Never done    TSH testing  07/28/2022    DTaP/Tdap/Td vaccine (3 - Td or Tdap) 09/29/2023    DEXA (modify frequency per FRAX score)  Completed    Flu vaccine  Completed    Pneumococcal 65+ years Vaccine  Completed    COVID-19 Vaccine  Completed    Hepatitis C screen  Completed    Hepatitis A vaccine  Aged Out    Hepatitis B vaccine  Aged Out    Hib vaccine  Aged Out    Meningococcal (ACWY) vaccine  Aged Out     Recommendations for [employer] Due: see orders and patient instructions/AVS.  . Recommended screening schedule for the next 5-10 years is provided to the patient in written form: see Patient Instructions/AVS.    Bob Temple was seen today for medicare awv.     Diagnoses and all orders for this visit:    Routine general medical examination at a health care facility   AWV today  ACP (advance care planning)  -     [address] [facility], [address] [[id number]]   Pt wants to be DNR-CCA, form completed today   Sister SUKHDEEP Hopper   Requested ACP docs  DNR (do not resuscitate) discussion   Completed form together with pt, copy placed on file and copies given to pt and encouraged to give copy to POA and alternates  Fall in bathtub, subsequent encounter   Healing, no bruising noted   Pain improved   CT head reviewed from 9 days ago s/p fall, WNL   CT IMPRESSION:   No evidence of acute intracranial abnormality. Hypercholesteremia   Work on limiting saturated fats in diet, and eating a healthy balance of fruits, vegetables, lean proteins, and multigrains. Physical activity 150 minutes weekly recommended    Maintain healthy weight   Not fasting for lipids at this time   Fasting labs in 2022 at first visit   Fish oil  Hypothyroidism due to acquired atrophy of thyroid   [place] thyroid 90 mg every other day   Levels checked and WNL this year   Repeat in 2022      Herpes medication taken daily             Advance Care Planning   Advanced Care Planning: Discussed the patients choices for care and treatment in case of a health event that adversely affects decision-making abilities. Also discussed the patients long-term treatment options. Reviewed with the patient the [address] [place] [address] of [address] [place] [address] Declaration forms  Reviewed the process of designating a competent adult as an Agent (or -in-fact) that could take make health care decisions for the patient if incompetent. Patient was asked to complete the declaration forms, either acknowledge the forms by a public notary or an eligible witness and provide a signed copy to the practice office. Time spent (minutes): 10    Pt would not want to be resuscitated. DNR form completed today. Pt wants to be DNR-CCA. HCPOA- sister Lora Hicks      Cardiovascular Disease Risk Counseling: Assessed the patient's risk to develop cardiovascular disease and reviewed main risk factors.    Reviewed steps to reduce disease risk including:   · Quitting tobacco use, reducing amount smoked, or not starting the habit  · Making healthy food choices  · Being physically active and gradualy increasing activity levels   · Reduce weight and determine a healthy BMI goal  · Monitor blood pressure and treat if higher than 140/90 mmHg  · Maintain blood total cholesterol levels under 5 mmol/l or 190 mg/dl  · Maintain LDL cholesterol levels under 3.0 mmol/l or 115 mg/dl   · Control blood glucose levels  · Consider taking aspirin (75 mg daily), once blood pressure is controlled   Provided a follow up plan. Time spent (minutes): 10    Care Gaps Addressed  Call insurance company to discuss coverage for shingles vaccine (Shingrix) 2 dose series   AWV today    I have reviewed patient's pertinent medical history, relevant laboratory and imaging studies, and past/future health maintenance. Discussed with the patient the importance of adhering to their current medication regimen as directed. Advised the patient that they should continue to work on eating a healthy balanced diet and staying active by exercising within their personal limits. Orders as listed above. Patient was advised to keep future appointments with their respective specialty care team(s). Patient had the opportunity to ask questions, all of which were answered to the best of my ability and with patient satisfaction. Patient understands and is agreeable with the care plan following today's visit. Patient is to schedule an appointment for any new or worsening symptoms. Go to ER for significant shortness of breath, chest pain, or uncontrolled pain or fever. I discussed with patient the risk and benefits of any medications that were prescribed today. I verified that the patient understands their medications, labs, and/or procedures. The patient is doing well with current medication regimen and does not have any barriers to adherence. The patient's self-management abilities are good.      Follow Up in 4 Months for Thyroid and fasting labs check in- discussed yearly visit and pt prefers to come more often

## 2021-09-29 NOTE — PATIENT INSTRUCTIONS
Come fasting to next appointment, nothing but water or black coffee for 10 hours             Advance Directives: Care Instructions  Overview  An advance directive is a legal way to state your wishes at the end of your life. It tells your family and your doctor what to do if you can't say what you want. There are two main types of advance directives. You can change them any time your wishes change. Living will. This form tells your family and your doctor your wishes about life support and other treatment. The form is also called a declaration. Medical power of . This form lets you name a person to make treatment decisions for you when you can't speak for yourself. This person is called a health care agent (health care proxy, health care surrogate). The form is also called a durable power of  for health care. If you do not have an advance directive, decisions about your medical care may be made by a family member, or by a doctor or a  who doesn't know you. It may help to think of an advance directive as a gift to the people who care for you. If you have one, they won't have to make tough decisions by themselves. Follow-up care is a key part of your treatment and safety. Be sure to make and go to all appointments, and call your doctor if you are having problems. It's also a good idea to know your test results and keep a list of the medicines you take. What should you include in an advance directive? Many states have a unique advance directive form. (It may ask you to address specific issues.) Or you might use a universal form that's approved by many states. If your form doesn't tell you what to address, it may be hard to know what to include in your advance directive. Use the questions below to help you get started. · Who do you want to make decisions about your medical care if you are not able to?   · What life-support measures do you want if you have a serious illness that gets worse over time or can't be cured? · What are you most afraid of that might happen? (Maybe you're afraid of having pain, losing your independence, or being kept alive by machines.)  · Where would you prefer to die? (Your home? A hospital? A nursing home?)  · Do you want to donate your organs when you die? · Do you want certain Temple practices performed before you die? When should you call for help? Be sure to contact your doctor if you have any questions. Where can you learn more? Go to [email or web address] and sign in to your [employer] account. Enter R264 in the [employer] box to learn more about \"Advance Directives: Care Instructions. \"     If you do not have an account, please click on the \"Sign Up Now\" link. Current as of: March 17, 2021               Content Version: 13.0  © 5849-1808 [employer]. Care instructions adapted under license by [facility] [address] If you have questions about a medical condition or this instruction, always ask your healthcare professional. [place] [address] any warranty or liability for your use of this information. Learning About Medical Power of   What is a medical power of ? A medical power of , also called a durable power of  for health care, is one type of the legal forms called advance directives. It lets you name the person you want to make treatment decisions for you if you can't speak or decide for yourself. The person you choose is called your health care agent. This person is also called a health care proxy or health care surrogate. A medical power of  may be called something else in your state. How do you choose a health care agent? Choose your health care agent carefully. This person may or may not be a family member. Talk to the person before you make your final decision. Make sure he or she is comfortable with this responsibility.   It's a good idea to choose someone who:  · Is at least 25years old. · Knows you well and understands what makes life meaningful for you. · Understands your Worship and moral values. · Will do what you want, not what he or she wants. · Will be able to make difficult choices at a stressful time. · Will be able to refuse or stop treatment, if that is what you would want, even if you could die. · Will be firm and confident with health professionals if needed. · Will ask questions to get needed information. · Lives near you or agrees to travel to you if needed. Your family may help you make medical decisions while you can still be part of that process. But it's important to choose one person to be your health care agent in case you aren't able to make decisions for yourself. If you don't fill out the legal form and name a health care agent, the decisions your family can make may be limited. A health care agent may be called something else in your state. Who will make decisions for you if you don't have a health care agent? If you don't have a health care agent or a living will, you may not get the care you want. Decisions may be made by family members who disagree about your medical care. Or decisions may be made by a medical professional who doesn't know you well. In some cases, a  makes the decisions. When you name a health care agent, it is very clear who has the power to make health decisions for you. How do you name a health care agent? You name your health care agent on a legal form. This form is usually called a medical power of . Ask your hospital, state bar association, or office on aging where to find these forms. You must sign the form to make it legal. Some states require you to get the form notarized. This means that a person called a  watches you sign the form and then he or she signs the form. Some states also require that two or more witnesses sign the form.   Be sure to tell your family members and doctors who your health care agent is. Where can you learn more? Go to [email or web address] and sign in to your [employer] account. Enter [id number] in the [facility] box to learn more about \"Learning About [address]. \"     If you do not have an account, please click on the \"Sign Up Now\" link. Current as of: March 17, 2021               Content Version: 13.0  © 0926-0733 [employer]. Care instructions adapted under license by [facility] ([facility]). If you have questions about a medical condition or this instruction, always ask your healthcare professional. [address] any warranty or liability for your use of this information. Learning About Living Perroy  What is a living will? A living will, also called a declaration, is a legal form. It tells your family and your doctor your wishes when you can't speak for yourself. It's used by the health professionals who will treat you as you near the end of your life or if you get seriously hurt or ill. If you put your wishes in writing, your loved ones and others will know what kind of care you want. They won't need to guess. This can ease your mind and be helpful to others. And you can change or cancel your living will at any time. A living will is not the same as an estate or property will. An estate will explains what you want to happen with your money and property after you die. How do you use it? A living will is used to describe the kinds of treatment or life support you want as you near the end of your life or if you get seriously hurt or ill. Keep these facts in mind about living ghosh. · Your living will is used only if you can't speak or make decisions for yourself. Most often, one or more doctors must certify that you can't speak or decide for yourself before your living will takes effect.   · If you get better and can speak for yourself again, you can accept or refuse any treatment. It doesn't matter what you said in your living will. · Some states may limit your right to refuse treatment in certain cases. For example, you may need to clearly state in your living will that you don't want artificial hydration and nutrition, such as being fed through a tube. Is a living will a legal document? A living will is a legal document. Each state has its own laws about living ghosh. And a living will may be called something else in your state. Here are some things to know about living ghosh. · You don't need an  to complete a living will. But legal advice can be helpful if your state's laws are unclear. It can also help if your health history is complicated or your family can't agree on what should be in your living will. · You can change your living will at any time. Some people find that their wishes about end-of-life care change as their health changes. If you make big changes to your living will, complete a new form. · If you move to another state, make sure that your living will is legal in the state where you now live. In most cases, doctors will respect your wishes even if you have a form from a different state. · You might use a universal form that has been approved by many states. This kind of form can sometimes be filled out and stored online. Your digital copy will then be available wherever you have a connection to the internet. The doctors and nurses who need to treat you can find it right away. · Your state may offer an online registry. This is another place where you can store your living will online. · It's a good idea to get your living will notarized. This means using a person called a  to watch two people sign, or witness, your living will. What should you know when you create a living will?   Here are some questions to ask yourself as you make your living will:  · Do you know enough about life support methods that might be used? If not, talk to your doctor so you know what might be done if you can't breathe on your own, your heart stops, or you can't swallow. · What things would you still want to be able to do after you receive life-support methods? Would you want to be able to walk? To speak? To eat on your own? To live without the help of machines? · Do you want certain Faith practices performed if you become very ill? · If you have a choice, where do you want to be cared for? In your home? At a hospital or nursing home? · If you have a choice at the end of your life, where would you prefer to die? At home? In a hospital or nursing home? Somewhere else? · Would you prefer to be buried or cremated? · Do you want your organs to be donated after you die? What should you do with your living will? · Make sure that your family members and your health care agent have copies of your living will (also called a declaration). · Give your doctor a copy of your living will. Ask him or her to keep it as part of your medical record. If you have more than one doctor, make sure that each one has a copy. · Put a copy of your living will where it can be easily found. For example, some people may put a copy on their refrigerator door. If you are using a digital copy, be sure your doctor, family members, and health care agent know how to find and access it. Where can you learn more? Go to [email or web address] org and sign in to your [employer] account. Enter V503 in the [facility] box to learn more about \"Learning About Living Perromari. \"     If you do not have an account, please click on the \"Sign Up Now\" link. Current as of: March 17, 2021               Content Version: 13.0  © 6577-6055 Healthwise, Incorporated. Care instructions adapted under license by [facility] ([facility]).  If you have questions about a medical condition or this instruction, always ask your healthcare professional. Valentino Amble disclaims any warranty or liability for your use of this information. Personalized Preventive Plan for Shruti Manuel - 9/29/2021  Medicare offers a range of preventive health benefits. Some of the tests and screenings are paid in full while other may be subject to a deductible, co-insurance, and/or copay. Some of these benefits include a comprehensive review of your medical history including lifestyle, illnesses that may run in your family, and various assessments and screenings as appropriate. After reviewing your medical record and screening and assessments performed today your provider may have ordered immunizations, labs, imaging, and/or referrals for you. A list of these orders (if applicable) as well as your Preventive Care list are included within your After Visit Summary for your review. Other Preventive Recommendations:    · A preventive eye exam performed by an eye specialist is recommended every 1-2 years to screen for glaucoma; cataracts, macular degeneration, and other eye disorders. · A preventive dental visit is recommended every 6 months. · Try to get at least 150 minutes of exercise per week or 10,000 steps per day on a pedometer . · Order or download the FREE \"Exercise & Physical Activity: Your Everyday Guide\" from [employer]. Call 1-368.768.1079 or search [employer] online. · You need 2991-7682 mg of calcium and 8848-9544 IU of vitamin D per day. It is possible to meet your calcium requirement with diet alone, but a vitamin D supplement is usually necessary to meet this goal.  · When exposed to the sun, use a sunscreen that protects against both UVA and UVB radiation with an SPF of 30 or greater. Reapply every 2 to 3 hours or after sweating, drying off with a towel, or swimming. · Always wear a seat belt when traveling in a car. Always wear a helmet when riding a bicycle or motorcycle.       Patient Education        Advance Directives: Care Instructions  Overview  An advance directive is a legal way to state your wishes at the end of your life. It tells your family and your doctor what to do if you can't say what you want. There are two main types of advance directives. You can change them any time your wishes change. Living will. This form tells your family and your doctor your wishes about life support and other treatment. The form is also called a declaration. Medical power of . This form lets you name a person to make treatment decisions for you when you can't speak for yourself. This person is called a health care agent (health care proxy, health care surrogate). The form is also called a durable power of  for health care. If you do not have an advance directive, decisions about your medical care may be made by a family member, or by a doctor or a  who doesn't know you. It may help to think of an advance directive as a gift to the people who care for you. If you have one, they won't have to make tough decisions by themselves. Follow-up care is a key part of your treatment and safety. Be sure to make and go to all appointments, and call your doctor if you are having problems. It's also a good idea to know your test results and keep a list of the medicines you take. What should you include in an advance directive? Many states have a unique advance directive form. (It may ask you to address specific issues.) Or you might use a universal form that's approved by many states. If your form doesn't tell you what to address, it may be hard to know what to include in your advance directive. Use the questions below to help you get started. · Who do you want to make decisions about your medical care if you are not able to? · What life-support measures do you want if you have a serious illness that gets worse over time or can't be cured? · What are you most afraid of that might happen?  (Maybe you're afraid of having pain, losing your independence, or being kept alive by machines.)  · Where would you prefer to die? (Your home? A hospital? A nursing home?)  · Do you want to donate your organs when you die? · Do you want certain Lutheran practices performed before you die? When should you call for help? Be sure to contact your doctor if you have any questions. Where can you learn more? Go to [email or web address] and sign in to your [employer] account. Enter R264 in the [employer] box to learn more about \"Advance Directives: Care Instructions. \"     If you do not have an account, please click on the \"Sign Up Now\" link. Current as of: March 17, 2021               Content Version: 13.0  © 1179-2459 [employer]. Care instructions adapted under license by [facility] ([facility]). If you have questions about a medical condition or this instruction, always ask your healthcare professional. [place] [address] any warranty or liability for your use of this information. Patient Education        Well Visit, Over 72: Care Instructions  Overview     Well visits can help you stay healthy. Your doctor has checked your overall health and may have suggested ways to take good care of yourself. Your doctor also may have recommended tests. At home, you can help prevent illness with healthy eating, regular exercise, and other steps. Follow-up care is a key part of your treatment and safety. Be sure to make and go to all appointments, and call your doctor if you are having problems. It's also a good idea to know your test results and keep a list of the medicines you take. How can you care for yourself at home? · Get screening tests that you and your doctor decide on. Screening helps find diseases before any symptoms appear. · Eat healthy foods. Choose fruits, vegetables, whole grains, protein, and low-fat dairy foods. Limit fat, especially saturated fat.  Reduce salt in your diet.  · Limit alcohol. If you are a man, have no more than 2 drinks a day or 14 drinks a week. If you are a woman, have no more than 1 drink a day or 7 drinks a week. Since alcohol affects older adults differently, you may want to limit alcohol even more. Or you may not want to drink at all. · Get at least 30 minutes of exercise on most days of the week. Walking is a good choice. You also may want to do other activities, such as running, swimming, cycling, or playing tennis or team sports. · Reach and stay at a healthy weight. This will lower your risk for many problems, such as obesity, diabetes, heart disease, and high blood pressure. · Do not smoke. Smoking can make health problems worse. If you need help quitting, talk to your doctor about stop-smoking programs and medicines. These can increase your chances of quitting for good. · Care for your mental health. It is easy to get weighed down by worry and stress. Learn strategies to manage stress, like deep breathing and mindfulness, and stay connected with your family and community. If you find you often feel sad or hopeless, talk with your doctor. Treatment can help. · Talk to your doctor about whether you have any risk factors for sexually transmitted infections (STIs). You can help prevent STIs if you wait to have sex with a new partner (or partners) until you've each been tested for STIs. It also helps if you use condoms (male or female condoms) and if you limit your sex partners to one person who only has sex with you. Vaccines are available for some STIs. · If you think you may have a problem with alcohol or drug use, talk to your doctor. This includes prescription medicines (such as amphetamines and opioids) and illegal drugs (such as cocaine and methamphetamine). Your doctor can help you figure out what type of treatment is best for you. · Protect your skin from too much sun.  When you're outdoors from 10 a.m. to 4 p.m., stay in the shade or cover up with clothing and a hat with a wide brim. Wear sunglasses that block UV rays. Even when it's cloudy, put broad-spectrum sunscreen (SPF 30 or higher) on any exposed skin. · See a dentist one or two times a year for checkups and to have your teeth cleaned. · Wear a seat belt in the car. When should you call for help? Watch closely for changes in your health, and be sure to contact your doctor if you have any problems or symptoms that concern you. Where can you learn more? Go to https://MobOz Technology srlpepiceweb.[email or web address]. org and sign in to your [facility] account. Enter V891 in the [employer] box to learn more about \"Well Visit, Over 65: Care Instructions. \"     If you do not have an account, please click on the \"Sign Up Now\" link. Current as of: February 11, 2021               Content Version: 13.0  © 7133-2628 Healthwise, Incorporated. Care instructions adapted under license by [facility] ([facility]). If you have questions about a medical condition or this instruction, always ask your healthcare professional. [place] [address] any warranty or liability for your use of this information.

## 2021-11-03 ENCOUNTER — OFFICE VISIT (OUTPATIENT)
Dept: FAMILY MEDICINE CLINIC | Age: 76
End: 2021-11-03
Payer: MEDICARE

## 2021-11-03 VITALS — BODY MASS INDEX: 21.51 KG/M2 | HEIGHT: 63 IN | WEIGHT: 121.4 LBS | TEMPERATURE: 97 F

## 2021-11-03 DIAGNOSIS — Z91.81 AT HIGH RISK FOR FALLS: ICD-10-CM

## 2021-11-03 DIAGNOSIS — B88.9 DERMATOSIS DUE TO MITES: Primary | ICD-10-CM

## 2021-11-03 PROCEDURE — G8420 CALC BMI NORM PARAMETERS: HCPCS | Performed by: NURSE PRACTITIONER

## 2021-11-03 PROCEDURE — 1036F TOBACCO NON-USER: CPT | Performed by: NURSE PRACTITIONER

## 2021-11-03 PROCEDURE — G8399 PT W/DXA RESULTS DOCUMENT: HCPCS | Performed by: NURSE PRACTITIONER

## 2021-11-03 PROCEDURE — 99213 OFFICE O/P EST LOW 20 MIN: CPT | Performed by: NURSE PRACTITIONER

## 2021-11-03 PROCEDURE — 1090F PRES/ABSN URINE INCON ASSESS: CPT | Performed by: NURSE PRACTITIONER

## 2021-11-03 PROCEDURE — 1123F ACP DISCUSS/DSCN MKR DOCD: CPT | Performed by: NURSE PRACTITIONER

## 2021-11-03 PROCEDURE — 4040F PNEUMOC VAC/ADMIN/RCVD: CPT | Performed by: NURSE PRACTITIONER

## 2021-11-03 PROCEDURE — G8484 FLU IMMUNIZE NO ADMIN: HCPCS | Performed by: NURSE PRACTITIONER

## 2021-11-03 PROCEDURE — G8427 DOCREV CUR MEDS BY ELIG CLIN: HCPCS | Performed by: NURSE PRACTITIONER

## 2021-11-03 RX ORDER — PERMETHRIN 50 MG/G
CREAM TOPICAL
Qty: 60 G | Refills: 0 | Status: SHIPPED | OUTPATIENT
Start: 2021-11-03

## 2021-11-03 ASSESSMENT — ENCOUNTER SYMPTOMS
SHORTNESS OF BREATH: 0
WHEEZING: 0

## 2021-11-03 NOTE — PROGRESS NOTES
Physical Exam  Constitutional:       General: She is not in acute distress. Appearance: Normal appearance. She is normal weight. Cardiovascular:      Pulses: Normal pulses. Heart sounds: Normal heart sounds. No murmur heard. No gallop. Skin:     Findings: Rash present. Comments: Urticarial rash to the torso, bilateral upper extremities, face, and forehead. No involvement of the hands. Neurological:      Mental Status: She is alert and oriented to person, place, and time. Psychiatric:         Mood and Affect: Mood normal.         Behavior: Behavior normal.         Thought Content: Thought content normal.         Judgment: Judgment normal.               This dictation was generated by voice recognition computer software. Although all attempts are made to edit the dictation for accuracy, there may be errors in the transcription that are not intended. An electronic signature was used to authenticate this note.     --GRISELDA Puri - CNP

## 2021-11-03 NOTE — PATIENT INSTRUCTIONS
Patient Education        permethrin topical  Pronunciation:  per METH rin  Brand:  Acticin, Elimite, Lice Bedding Spray, Nix Lice Control, RID Home Lice Control Spray for Surfaces  What is the most important information I should know about permethrin topical?  Follow all directions on your medicine label and package. Tell each of your healthcare providers about all your medical conditions, allergies, and all medicines you use. What is permethrin topical?  Permethrin is an anti-parasite medication. Permethrin topical (for the skin) is used to treat head lice and scabies. Permethrin topical may also be used for other purposes not listed in this medication guide. What should I discuss with my healthcare provider before using permethrin topical?  You should not use this medicine if you are allergic to permethrin or to chrysanthemums. Ask a doctor or pharmacist if it is safe for you to use this medicine if you have other medical conditions. Permethrin topical is not expected to be harmful to an unborn baby. Tell your doctor if you are pregnant. It is not known whether permethrin topical passes into breast milk or if it could harm a nursing baby. You should not breast-feed while using this medicine. Permethrin topical should not be used on a child younger than 2 months old. How should I use permethrin topical?  Use exactly as directed on the label, or as prescribed by your doctor. Do not use in larger or smaller amounts or for longer than recommended. You may have a temporary increase in itching, swelling, or redness of treated skin when you first start using permethrin topical.  Do not take by mouth. This medicine is for use only on the skin. Do not apply to open cuts or wounds. If the medicine gets in your eyes or mouth, rinse with water. Use the surface spray only on household surfaces and not on your skin. You may need to shake the medicine before each use.  Follow the directions on the medicine brushes, cummings, and hair accessories in hot water for at least 10 minutes. Use permethrin surface spray to disinfect non-washable items such as:  · furniture;  · mattresses and pillows;  · stuffed toys;  · hats, gloves, and scarves;  · headphones or headbands;  · the inside of a bike helmet; or  · seats and carpets inside your car. Stuffed toys or pillows that cannot be washed should be sealed in air-tight plastic bags for 4 weeks. After removing from the bag, vigorously shake the item outdoors. Vacuum all rugs, carpets, and car seats. Then throw away the vacuum  bag. For the most complete treatment of lice or scabies, you must treat your environment (clothing, bedding, etc) at the same time you treat your hair and/or body. Store permethrin topical at room temperature away from moisture and heat. What happens if I miss a dose? Since permethrin topical is usually needed only once, you are not likely to be on a dosing schedule. Wait at least 7 days before using a second application. What happens if I overdose? Seek emergency medical attention or call the Poison Help line at [phone] if you think you have used too much, or if anyone has accidentally swallowed the medication. What should I avoid while using permethrin topical?  Rinse with water if this medicine gets in your eyes. Lice and scabies infections are highly contagious. Avoid sexual or intimate contact with others until your lice or scabies infection has cleared up. Avoid sharing hair brushes, cummings, hair accessories, hats, clothing, bed linens, and other articles of personal use. Avoid using other medications on the areas you treat with permethrin topical, unless your doctor tells you to. What are the possible side effects of permethrin topical?  Get emergency medical help if you have signs of an allergic reaction:  hives; difficult breathing; swelling of your face, lips, tongue, or throat.   Call your doctor at once if you have expertise, skill, knowledge and judgment of healthcare practitioners. The absence of a warning for a given drug or drug combination in no way should be construed to indicate that the drug or drug combination is safe, effective or appropriate for any given patient. [facility] does not assume any responsibility for any aspect of healthcare administered with the aid of information [facility] provides. The information contained herein is not intended to cover all possible uses, directions, precautions, warnings, drug interactions, allergic reactions, or adverse effects. If you have questions about the drugs you are taking, check with your doctor, nurse or pharmacist.  Copyright 7949-5155 [address]: 7.01. Revision date: 3/22/2016. Care instructions adapted under license by [facility] ([facility]). If you have questions about a medical condition or this instruction, always ask your healthcare professional. [place] [address] any warranty or liability for your use of this information.

## 2021-11-10 ENCOUNTER — TELEPHONE (OUTPATIENT)
Dept: ENT CLINIC | Age: 76
End: 2021-11-10

## 2021-11-10 NOTE — TELEPHONE ENCOUNTER
Patient calling saying that every winter her nose  get's dry,  And then she says that her nose does bleed a little , she is asking , what nasal spray should she use to help this    Patient's LV was 7-13-21   Next appt is 1-13-22       Please advise    Corazon Cr

## 2021-11-11 ENCOUNTER — TELEPHONE (OUTPATIENT)
Dept: ENT CLINIC | Age: 76
End: 2021-11-11

## 2021-11-11 NOTE — TELEPHONE ENCOUNTER
[phone] (home)    pt state that theres not major issue with her nose and she will find a nasal spray over the counter to help her until her next appt which is 1-

## 2021-11-11 NOTE — TELEPHONE ENCOUNTER
I have not seen her for this particular problem and have not examined her nose. Please schedule appointment for evaluation and examination and I will be able to determine this.

## 2021-11-11 NOTE — TELEPHONE ENCOUNTER
[phone] (home)  called pt , pt states she is not having major issues with her nose and she will find a nasal spray over the counter until her next appt which is 1-

## 2021-12-12 ENCOUNTER — HOSPITAL ENCOUNTER (EMERGENCY)
Age: 76
Discharge: HOME | End: 2021-12-12
Attending: EMERGENCY MEDICINE
Payer: MEDICARE

## 2021-12-12 ENCOUNTER — APPOINTMENT (OUTPATIENT)
Dept: CT IMAGING | Age: 76
End: 2021-12-12
Payer: MEDICARE

## 2021-12-12 VITALS
DIASTOLIC BLOOD PRESSURE: 62 MMHG | SYSTOLIC BLOOD PRESSURE: 145 MMHG | WEIGHT: 120 LBS | BODY MASS INDEX: 22.08 KG/M2 | HEART RATE: 84 BPM | OXYGEN SATURATION: 97 % | TEMPERATURE: 98.6 F | RESPIRATION RATE: 18 BRPM | HEIGHT: 62 IN

## 2021-12-12 DIAGNOSIS — S01.81XA FACIAL LACERATION, INITIAL ENCOUNTER: ICD-10-CM

## 2021-12-12 DIAGNOSIS — S09.90XA CLOSED HEAD INJURY, INITIAL ENCOUNTER: Primary | ICD-10-CM

## 2021-12-12 PROCEDURE — 90715 TDAP VACCINE 7 YRS/> IM: CPT | Performed by: EMERGENCY MEDICINE

## 2021-12-12 PROCEDURE — 6370000000 HC RX 637 (ALT 250 FOR IP)

## 2021-12-12 PROCEDURE — 90471 IMMUNIZATION ADMIN: CPT | Performed by: EMERGENCY MEDICINE

## 2021-12-12 PROCEDURE — 2500000003 HC RX 250 WO HCPCS

## 2021-12-12 PROCEDURE — 12011 RPR F/E/E/N/L/M 2.5 CM/<: CPT

## 2021-12-12 PROCEDURE — 99283 EMERGENCY DEPT VISIT LOW MDM: CPT

## 2021-12-12 PROCEDURE — 6360000002 HC RX W HCPCS: Performed by: EMERGENCY MEDICINE

## 2021-12-12 PROCEDURE — 70450 CT HEAD/BRAIN W/O DYE: CPT

## 2021-12-12 RX ORDER — LIDOCAINE HYDROCHLORIDE AND EPINEPHRINE 10; 10 MG/ML; UG/ML
20 INJECTION, SOLUTION INFILTRATION; PERINEURAL ONCE
Status: DISCONTINUED | OUTPATIENT
Start: 2021-12-12 | End: 2021-12-12

## 2021-12-12 RX ORDER — BACITRACIN, NEOMYCIN, POLYMYXIN B 400; 3.5; 5 [USP'U]/G; MG/G; [USP'U]/G
OINTMENT TOPICAL
Status: COMPLETED
Start: 2021-12-12 | End: 2021-12-12

## 2021-12-12 RX ADMIN — LIDOCAINE HYDROCHLORIDE: 10; .005 INJECTION, SOLUTION EPIDURAL; INFILTRATION; INTRACAUDAL; PERINEURAL at 04:27

## 2021-12-12 RX ADMIN — TETANUS TOXOID, REDUCED DIPHTHERIA TOXOID AND ACELLULAR PERTUSSIS VACCINE, ADSORBED 0.5 ML: 5; 2.5; 8; 8; 2.5 SUSPENSION INTRAMUSCULAR at 05:04

## 2021-12-12 RX ADMIN — BACITRACIN ZINC, NEOMYCIN SULFATE, POLYMYXIN B SULFATE: 3.5; 5000; 4 OINTMENT TOPICAL at 05:23

## 2021-12-12 ASSESSMENT — PAIN SCALES - GENERAL
PAINLEVEL_OUTOF10: 3
PAINLEVEL_OUTOF10: 7

## 2021-12-12 NOTE — ED NOTES
Pt states she had a fall tonight and hit her head. PT has a laceration above the left eyebrow. Pt denies any other injuries. Pt alert and oriented, ambulatory on arrival. MD at bedside suturing laceration at this time.       Liz Preciado RN  12/12/21 4070

## 2021-12-12 NOTE — ED PROVIDER NOTES
[facility]        Pt Name: Aramis Fishman  MRN: [id number]  [place] 1945  Date of evaluation: 12/12/2021  Provider: Kin Cockayne, MD  PCP: Diana Olivarez MD    This patient was seen and evaluated by the attending physician Kin Cockayne, MD.      [facility]       Chief Complaint   Patient presents with    Fall     Patient states she tripped on her rug last night, fell and hit her head on the floor. Denies LOC. HISTORY OF PRESENT ILLNESS   (Location/Symptom, Timing/Onset, Context/Setting, Quality, Duration, Modifying Factors, Severity)  Note limiting factors. Aramis Fishman is a 67 y.o. female here today with a CC of closed head injury, fall on rug, laceration. No LOC, AMS  Mechanical trip/fall. Unknown last TDAP. Nursing Notes were all reviewed and agreed with or any disagreements were addressed  in the HPI. REVIEW OF SYSTEMS    (2-9 systems for level 4, 10 or more for level 5)     Review of Systems    Positives and Pertinent negatives as per HPI. Except as noted abovein the ROS, all other systems were reviewed and negative.        PAST MEDICAL HISTORY     Past Medical History:   Diagnosis Date    Basal cell carcinoma     x 5    Bloating     Blood transfusion     age 26    Candida infection     stomach    Colon polyps     Dry eyes     9/16    Hepatitis B     age 26    Herpes genitalis in women 11/29/2012    HPV in female 2010    DR CHAUHAN FOLLOWS PT    Hypothyroidism     Osteoporosis     Thyroid disease     hypo         SURGICAL HISTORY     Past Surgical History:   Procedure Laterality Date    BREAST BIOPSY  2002    benign (quit ERT)    CHOLECYSTECTOMY, LAPAROSCOPIC  9/4/2003    COLONOSCOPY N/A 10/10/2019    COLONOSCOPY POLYPECTOMY SNARE/COLD BIOPSY performed by Vishnu Marino MD at [facility]  8-28-10    hemorhoidectomy   [address]    no BSO    JOINT REPLACEMENT  2009    left total hip for fracture    JOINT REPLACEMENT  2019    right hip replacement    ROTATOR CUFF REPAIR  5/17/2005    right    SKIN CANCER EXCISION  [id number]    BASAL CELL REMOVAL LEFT LEG         CURRENTMEDICATIONS       Discharge Medication List as of 12/12/2021  5:24 AM      CONTINUE these medications which have NOT CHANGED    Details   permethrin (ELIMITE) 5 % cream Apply to skin at night. Keep on skin for 8-14 hours. Rinse off in shower. Repeat in 1 week., Disp-60 g, R-0, Normal      !! MISC NATURAL PRODUCTS PO Take by mouth ostebase (Ca+Vit D +Vit. K+ mg+)Historical Med      !! MISC NATURAL PRODUCTS PO Take 1 capsule by mouth 3 times daily ALPHA BASE CAPS WITHOUT IRONHistorical Med      !! MISC NATURAL PRODUCTS PO Take 1 tablet by mouth daily 4SIGHTHistorical Med      !! MISC NATURAL PRODUCTS PO Take 2 tablets by mouth daily COSMEDIXHistorical Med      !! MISC NATURAL PRODUCTS PO Take 1 tablet by mouth daily [address]      !! MISC NATURAL PRODUCTS PO Take 1 tablet by mouth daily garlicHistorical Med      Omega-3 Fatty Acids (FISH OIL) 1200 MG CAPS Take by mouthHistorical Med      !! Misc Natural Products CAPS DAILY, Until Discontinued, Historical MedADRENAL CAPS       Vitamin E 100 UNITS TABS Take 1 tablet by mouth daily Historical Med      thyroid (ARMOUR THYROID) 90 MG tablet TAKE ONE TABLET BY MOUTH EVERY OTHER DAY, Disp-15 tablet, R-5Normal      acyclovir (ZOVIRAX) 400 MG tablet Take 1 tablet by mouth daily, Disp-90 tablet, R-3Normal      NONFORMULARY OSTEO BASEHistorical Med       !! - Potential duplicate medications found. Please discuss with provider.             ALLERGIES     Alendronate, Doxycycline, Penicillins, Sulfa antibiotics, and Zithromax [azithromycin]    FAMILYHISTORY       Family History   Problem Relation Age of Onset    Cancer Father         spine    Cancer Sister         pancreas    Substance Abuse Brother         alcohol    Liver Disease Brother         cirrhosis    Alzheimer's Disease Mother           SOCIAL HISTORY       Social History     Socioeconomic History    Marital status:      Spouse name: Not on file    Number of children: Not on file    Years of education: Not on file    Highest education level: Not on file   Occupational History    Not on file   Tobacco Use    Smoking status: Never Smoker    Smokeless tobacco: Never Used   Substance and Sexual Activity    Alcohol use: Yes     Alcohol/week: 1.0 standard drink     Types: 1 Glasses of wine per week     Comment: daily    Drug use: No    Sexual activity: Not Currently     Partners: Male   Other Topics Concern    Not on file   Social History Narrative    Not on file     Social Determinants of Health     Financial Resource Strain: Low Risk     Difficulty of Paying Living Expenses: Not hard at all   Food Insecurity: No Food Insecurity    Worried About [address] in the Last Year: Never true    [address] [employer] in the Last Year: Never true   Transportation Needs: No Transportation Needs    Lack of Transportation (Medical): No    Lack of Transportation (Non-Medical):  No   Physical Activity:     Days of Exercise per Week: Not on file    Minutes of Exercise per Session: Not on file   Stress:     Feeling of Stress : Not on file   Social Connections:     Frequency of Communication with Friends and Family: Not on file    Frequency of Social Gatherings with Friends and Family: Not on file    Attends Baptism Services: Not on file    Active Member of Clubs or Organizations: Not on file    Attends Club or Organization Meetings: Not on file    Marital Status: Not on file   Intimate Partner Violence:     Fear of Current or Ex-Partner: Not on file    Emotionally Abused: Not on file    Physically Abused: Not on file    Sexually Abused: Not on file   Housing Stability:     Unable to Pay for Housing in the Last Year: Not on file    Number of Places Lived in the Last Year: Not on file    Unstable Housing in the Last Year: Not on file       SCREENINGS             PHYSICAL EXAM    (up to 7 for level 4, 8 or more for level 5)     ED Triage Vitals   BP Temp Temp src Pulse Resp SpO2 Height Weight   -- -- -- -- -- -- -- --       Physical Exam     General Appearance:  Alert, cooperative, no distress, appears stated age. Head:  Normocephalic, without obvious abnormality, Left eyebrow laceration ~2cm in length, just superior to left eyebrow. .   Eyes:  conjunctiva/corneas clear, EOM's intact. Sclera anicteric. PERRLA, EOMI.   ENT: Mucous membranes moist.   Neck: Supple, symmetrical, trachea midline, no adenopathy. No jugular venous distention. Lungs:   No Respiratory Distress. Chest Wall:  Atraumatic   Heart:  RRR   Abdomen:   Soft, NT, ND   Extremities:  Full range of motion. Pulses: Symmetric x4   Skin:  No rashes or lesions to exposed skin. Neurologic: Alert and oriented X 3. Motor grossly normal.  Speech clear. DIAGNOSTIC RESULTS   LABS:    Labs Reviewed - No data to display    All other labs were within normal range or not returned as of this dictation. EKG: All EKG's are interpreted by the Emergency Department Physician in the absence of a cardiologist.  Please see their note for interpretation of EKG. RADIOLOGY:   Non-plain film images such as CT, Ultrasound and MRI are read by the radiologist. Ivone Stanford radiographic images are visualized andpreliminarily interpreted by the  ED Provider with the below findings:        Interpretation [facility] Radiologist below, if available at the time of this note:    CT Head WO Contrast   Final Result   Stable CT scan of the head without acute intracranial abnormality. Chronic   microvascular ischemic changes appear similar. No results found.         PROCEDURES   Unless otherwise noted below, none     Lac Repair    Date/Time: 12/12/2021 4:39 AM  Performed by: Shweta Powell MD  Authorized by: Corrine Garcia MD     Consent:     Consent obtained:  Verbal    Consent given by:  Patient  Anesthesia (see MAR for exact dosages): Anesthesia method:  Local infiltration    Local anesthetic:  Lidocaine 1% WITH epi  Laceration details:     Location:  Face    Face location:  L eyebrow    Length (cm):  2  Repair type:     Repair type:  Simple  Pre-procedure details:     Preparation:  Patient was prepped and draped in usual sterile fashion  Exploration:     Hemostasis achieved with:  Direct pressure    Wound exploration: wound explored through full range of motion and entire depth of wound probed and visualized      Contaminated: no    Treatment:     Area cleansed with:  Hibiclens    Amount of cleaning:  Standard  Skin repair:     Repair method:  Sutures    Suture size:  5-0    Suture material:  Prolene    Suture technique:  Running locked    Number of sutures:  1  Approximation:     Approximation:  Close  Post-procedure details:     Dressing:  Bulky dressing and antibiotic ointment    Patient tolerance of procedure: Tolerated well, no immediate complications        CRITICAL CARE TIME   N/A    CONSULTS:  None      EMERGENCY DEPARTMENT COURSE and DIFFERENTIAL DIAGNOSIS/MDM:   Vitals:    Vitals:    12/12/21 0408 12/12/21 0516   BP: (!) 172/84 (!) 145/62   Pulse: 88 84   Resp: 20 18   Temp: 98.6 °F (37 °C)    TempSrc: Oral    SpO2: 97%    Weight: 120 lb (54.4 kg)    Height: 5' 2\" (1.575 m)        Patient was given thefollowing medications:  Medications   Tetanus-Diphth-Acell Pertussis (BOOSTRIX) injection 0.5 mL (0.5 mLs IntraMUSCular Given 12/12/21 3347)   lidocaine-EPINEPHrine 1 percent-1:848796 injection (  Given 12/12/21 0627)   neomycin-bacitracin-polymyxin (NEOSPORIN) 400-5-5000 ointment (  Given 12/12/21 6096)       76[facility]  Repair Lac  Update tetanus  CT head r/o pathology. FINAL IMPRESSION      1. Closed head injury, initial encounter    2.  Facial laceration, initial encounter          DISPOSITION/PLAN   DISPOSITION Decision To Discharge 12/12/2021 04:21:29 AM      PATIENT REFERREDTO:  Rex Lemus MD  [address] [place] [address]  [phone]            DISCHARGE MEDICATIONS:  Discharge Medication List as of 12/12/2021  5:24 AM          DISCONTINUED MEDICATIONS:  Discharge Medication List as of 12/12/2021  5:24 AM                 (Please note that portions ofthis note were completed with a voice recognition program.  Efforts were made to edit the dictations but occasionally words are mis-transcribed.)    Niyah Ureña MD (electronically signed)           Niyah Ureña MD  12/12/21 9953

## 2021-12-13 ENCOUNTER — TELEPHONE (OUTPATIENT)
Dept: FAMILY MEDICINE CLINIC | Age: 76
End: 2021-12-13

## 2021-12-13 NOTE — TELEPHONE ENCOUNTER
LM FOR PT TO CB TO SCHEDULE AN APPT FOR SUTURE REMOVAL. PT CAN SEE A NURSE PRACTITIONER TO HAVE THESE REMOVED.   [facility]

## 2021-12-13 NOTE — TELEPHONE ENCOUNTER
----- Message from [place] sent at 12/13/2021  4:13 PM EST -----  Subject: Message to Provider    QUESTIONS  Information for Provider? Pt went to [facility] on 12[place]11/[place]21. Pt   tripped and hit her head. Pt has about 6 stiches in her head. Pt is trying   to come in next week. Patient is needing them out between three to 5 days. Please call pt back. ---------------------------------------------------------------------------  --------------  Warden Fito WILL  What is the best way for the office to contact you? OK to leave message on   voicemail  Preferred Call Back Phone Number? [phone]  ---------------------------------------------------------------------------  --------------  SCRIPT ANSWERS  Relationship to Patient? Self  (Is the patient requesting to see the provider for a procedure?)?  Yes

## 2021-12-14 ENCOUNTER — TELEPHONE (OUTPATIENT)
Dept: FAMILY MEDICINE CLINIC | Age: 76
End: 2021-12-14

## 2021-12-14 NOTE — TELEPHONE ENCOUNTER
TREV SCHEDULED AN APPT FOR THE PT FOR Thursday.   [facility]

## 2021-12-17 ENCOUNTER — OFFICE VISIT (OUTPATIENT)
Dept: FAMILY MEDICINE CLINIC | Age: 76
End: 2021-12-17
Payer: MEDICARE

## 2021-12-17 VITALS
DIASTOLIC BLOOD PRESSURE: 80 MMHG | HEART RATE: 86 BPM | HEIGHT: 62 IN | OXYGEN SATURATION: 98 % | BODY MASS INDEX: 21.71 KG/M2 | SYSTOLIC BLOOD PRESSURE: 120 MMHG | WEIGHT: 118 LBS | RESPIRATION RATE: 18 BRPM

## 2021-12-17 DIAGNOSIS — S01.81XD LACERATION OF SKIN OF FOREHEAD, SUBSEQUENT ENCOUNTER: Primary | ICD-10-CM

## 2021-12-17 PROCEDURE — 1123F ACP DISCUSS/DSCN MKR DOCD: CPT | Performed by: NURSE PRACTITIONER

## 2021-12-17 PROCEDURE — G8420 CALC BMI NORM PARAMETERS: HCPCS | Performed by: NURSE PRACTITIONER

## 2021-12-17 PROCEDURE — 4040F PNEUMOC VAC/ADMIN/RCVD: CPT | Performed by: NURSE PRACTITIONER

## 2021-12-17 PROCEDURE — 99213 OFFICE O/P EST LOW 20 MIN: CPT | Performed by: NURSE PRACTITIONER

## 2021-12-17 PROCEDURE — G8484 FLU IMMUNIZE NO ADMIN: HCPCS | Performed by: NURSE PRACTITIONER

## 2021-12-17 PROCEDURE — G8399 PT W/DXA RESULTS DOCUMENT: HCPCS | Performed by: NURSE PRACTITIONER

## 2021-12-17 PROCEDURE — G8427 DOCREV CUR MEDS BY ELIG CLIN: HCPCS | Performed by: NURSE PRACTITIONER

## 2021-12-17 PROCEDURE — 1090F PRES/ABSN URINE INCON ASSESS: CPT | Performed by: NURSE PRACTITIONER

## 2021-12-17 PROCEDURE — 1036F TOBACCO NON-USER: CPT | Performed by: NURSE PRACTITIONER

## 2021-12-17 ASSESSMENT — ENCOUNTER SYMPTOMS
WHEEZING: 0
SHORTNESS OF BREATH: 0

## 2021-12-17 NOTE — PATIENT INSTRUCTIONS
GENERAL OFFICE POLICIES      Telephone Calls: Messages will be answered within 1-2 business days, unless the provider is out of the office. If it is urgent a covering provider will answer. (this does not include Medication refills). MyChart: We recommend all patients sign up for Smart Voicemailhart. Through this portal you can see your lab results, request refills, schedule appointments, pay your bill and send messages to the office. Smart Voicemailhart messages will be answered within 1-2 business days unless the provider is out of the office. For urgent matters, please call the office. Appointments:  All appointments must be scheduled. We ask all patients to schedule their next follow up appointment before they leave the office to make sure you will be able to be seen before you run out of medications. 24 hours notice is required to cancel or reschedule an appointment to avoid being marked as a no show. You may be dismissed from the practice after 3 no shows. LATE for Appointment: If you are 15 or more minutes late for your appointment, you may be asked to reschedule. MA/LAB APPTS: Must be scheduled, cannot accept walk in lab visits. We only draw labs for patients established in our office. We only do injections for medications ordered by our office. Acute Sick Visits:  Nothing other than acute complaint will be addressed at this visit. TRADITIONAL MEDICARE  DOES NOT COVER PHYSICALS  MEDICARE WELLNESS VISITS: These are NOT physicals but the free annual visit offered by Medicare to discuss wellness issues. Medication refills, checkups, etc. will not be addressed during this visit. Medication Refills: Refills are handled electronically so please contact your pharmacy for medication refills even if current refills have been exhausted. If you are on a controlled medication you will be referred to a specialist (pain specialist, psychiatry, etc). Forms:  There is a $35 fee to fill out FMLA/Disability paperwork, payable at time of . Instead of the fee, you can choose to have the paperwork filled out during a separate office visit that is for filling out the paperwork only. Medication Samples: This office does not carry medication samples. If you need assistance in getting your medications, then please let the medical assistant know so they can help you sign up for a drug assistance program that can help get medications at a reduced cost or even free (if you qualify). Workman's Comp Claims: We do not handle workman's comp cases or claims. You will need to go to an urgent care to be seen or to whomever your employer uses.   General - Any abusive/rude behavior toward staff/providers may be cause for dismissal.

## 2021-12-17 NOTE — PROGRESS NOTES
Dodie Merrill (:  1945) is a 67 y.o. female,Established patient, here for evaluation of the following chief complaint(s):  Suture / Staple Removal (PT HERE TO GET SUTURES REMOVED )      ASSESSMENT/PLAN:  1. Laceration of skin of forehead, subsequent encounter  -Healing without issue. Suture x1 removed by me without complication. Educated on signs and symptoms of complications including severe headache, vision changes, and change in mental status. Follow-up as needed. No follow-ups on file. SUBJECTIVE/OBJECTIVE:  HPI  Rama Cowden presents today for ER follow-up/suture removal.  She was seen at [facility] on  following a fall. She states that she fell in her house. She tripped over a rug and hit her head on the floor. She states that she tried to initially manage this with Band-Aids, but she cannot stop the bleeding. Decided to then go into the ER the next morning. She denies any headaches, dizziness, or blurred vision. She received 1 suture in the emergency room. Feels that has been healing without issue. Is tried to keep it clean. No new bleeding. Review of Systems   Constitutional: Negative for chills and fever. Respiratory: Negative for shortness of breath and wheezing. Cardiovascular: Negative for chest pain, palpitations and leg swelling. Skin: Positive for wound. Neurological: Negative for dizziness, weakness, light-headedness, numbness and headaches. Psychiatric/Behavioral: Negative for decreased concentration, dysphoric mood and sleep disturbance. The patient is not nervous/anxious. Physical Exam  Constitutional:       General: She is not in acute distress. Appearance: Normal appearance. She is normal weight. Skin:     Comments: Laceration to the left forehead just above the eyebrow. 1 suture in place. Area is scabbed. Bruising around the left orbit. Mild swelling.    Neurological:      Mental Status: She is alert and oriented to person, place, and time.   Psychiatric:         Mood and Affect: Mood normal.         Behavior: Behavior normal.         Thought Content: Thought content normal.         Judgment: Judgment normal.               This dictation was generated by voice recognition computer software. Although all attempts are made to edit the dictation for accuracy, there may be errors in the transcription that are not intended. An electronic signature was used to authenticate this note.     --Domenica Hamman, APRN - CNP

## 2021-12-28 ENCOUNTER — HOSPITAL ENCOUNTER (OUTPATIENT)
Dept: WOMENS IMAGING | Age: 76
Discharge: HOME | End: 2021-12-28
Payer: MEDICARE

## 2021-12-28 VITALS — WEIGHT: 118 LBS | HEIGHT: 62 IN | BODY MASS INDEX: 21.71 KG/M2

## 2021-12-28 DIAGNOSIS — Z12.31 VISIT FOR SCREENING MAMMOGRAM: ICD-10-CM

## 2021-12-28 PROCEDURE — 77063 BREAST TOMOSYNTHESIS BI: CPT

## 2021-12-30 ENCOUNTER — OFFICE VISIT (OUTPATIENT)
Dept: FAMILY MEDICINE CLINIC | Age: 76
End: 2021-12-30
Payer: MEDICARE

## 2021-12-30 ENCOUNTER — TELEPHONE (OUTPATIENT)
Dept: FAMILY MEDICINE CLINIC | Age: 76
End: 2021-12-30

## 2021-12-30 VITALS
OXYGEN SATURATION: 97 % | HEIGHT: 62 IN | TEMPERATURE: 97.5 F | BODY MASS INDEX: 21.64 KG/M2 | SYSTOLIC BLOOD PRESSURE: 110 MMHG | HEART RATE: 85 BPM | WEIGHT: 117.6 LBS | RESPIRATION RATE: 18 BRPM | DIASTOLIC BLOOD PRESSURE: 60 MMHG

## 2021-12-30 DIAGNOSIS — S01.81XD LACERATION OF SKIN OF FOREHEAD, SUBSEQUENT ENCOUNTER: Primary | ICD-10-CM

## 2021-12-30 PROCEDURE — G8399 PT W/DXA RESULTS DOCUMENT: HCPCS | Performed by: NURSE PRACTITIONER

## 2021-12-30 PROCEDURE — 1090F PRES/ABSN URINE INCON ASSESS: CPT | Performed by: NURSE PRACTITIONER

## 2021-12-30 PROCEDURE — G8427 DOCREV CUR MEDS BY ELIG CLIN: HCPCS | Performed by: NURSE PRACTITIONER

## 2021-12-30 PROCEDURE — 99213 OFFICE O/P EST LOW 20 MIN: CPT | Performed by: NURSE PRACTITIONER

## 2021-12-30 PROCEDURE — 1036F TOBACCO NON-USER: CPT | Performed by: NURSE PRACTITIONER

## 2021-12-30 PROCEDURE — 4040F PNEUMOC VAC/ADMIN/RCVD: CPT | Performed by: NURSE PRACTITIONER

## 2021-12-30 PROCEDURE — G8484 FLU IMMUNIZE NO ADMIN: HCPCS | Performed by: NURSE PRACTITIONER

## 2021-12-30 PROCEDURE — 1123F ACP DISCUSS/DSCN MKR DOCD: CPT | Performed by: NURSE PRACTITIONER

## 2021-12-30 PROCEDURE — G8420 CALC BMI NORM PARAMETERS: HCPCS | Performed by: NURSE PRACTITIONER

## 2021-12-30 ASSESSMENT — ENCOUNTER SYMPTOMS
EYE REDNESS: 0
SHORTNESS OF BREATH: 0
EYE DISCHARGE: 0
PHOTOPHOBIA: 0
WHEEZING: 0
EYE PAIN: 1
EYE ITCHING: 0

## 2021-12-30 NOTE — PROGRESS NOTES
Maximus Lopez (:  1945) is a 67 y.o. female,Established patient, here for evaluation of the following chief complaint(s): Other (CHECK ON SUTURE SCAR )      ASSESSMENT/PLAN:  1. Laceration of skin of forehead, subsequent encounter  -The area to the forehead is healing very well without complication. Reemphasized application of ice when pain occurs. No additional intervention indicated at this time. Return if symptoms worsen or fail to improve. SUBJECTIVE/OBJECTIVE:  KEERTHI Marie presents today for reevaluation of the laceration to her forehead. She states it has been healing fine, but she noted some swelling over her eyelid. She was worried that this might be a blood clot and wanted to be evaluated. She states that the eye does continue to be painful. She has made an appointment with her eye doctor and been evaluated and cleared. She also has an appointment next month to be followed up by her ophthalmologist for macular degeneration. She was concerned that maybe she would need an additional antibiotic and contacted her eye care provider, but they stated that they would not prescribe this. Denies any vision changes. Denies any drainage. Review of Systems   Constitutional: Negative for chills and fever. Eyes: Positive for pain. Negative for photophobia, discharge, redness, itching and visual disturbance. Respiratory: Negative for shortness of breath and wheezing. Cardiovascular: Negative for chest pain, palpitations and leg swelling. Skin:        Healing wound to the left forehead   Neurological: Negative for dizziness, weakness, light-headedness, numbness and headaches. Psychiatric/Behavioral: Negative for decreased concentration, dysphoric mood and sleep disturbance. The patient is not nervous/anxious. Physical Exam  Constitutional:       General: She is not in acute distress. Appearance: Normal appearance. She is normal weight.    Eyes:      General: No scleral icterus. Right eye: No discharge. Left eye: No discharge. Extraocular Movements: Extraocular movements intact. Conjunctiva/sclera: Conjunctivae normal.      Pupils: Pupils are equal, round, and reactive to light. Skin:     Comments: Healing wound to the left forehead just superior to the eyelid. Granulation tissue present. No drainage. Pink. The superior aspect of the orbital is mildly edematous without palpable hematoma or mass. Neurological:      Mental Status: She is alert and oriented to person, place, and time. Psychiatric:         Mood and Affect: Mood normal.         Behavior: Behavior normal.         Thought Content: Thought content normal.         Judgment: Judgment normal.               This dictation was generated by voice recognition computer software. Although all attempts are made to edit the dictation for accuracy, there may be errors in the transcription that are not intended. An electronic signature was used to authenticate this note.     --James Alfonso, GRISELDA - CNP

## 2021-12-30 NOTE — TELEPHONE ENCOUNTER
PT CALLED IN THIS MORNING, STATED THERE IS A BLOOD CLOT WHERE WE REMOVED HER SUTURES.  ITS HARD AND TENDER TO TOUCH ABOVE EYE I DO NOT HAVE ANY OPENINGS TODAY NOT SURE IF SHE NEEDS TO COME IN OR NOT?KW

## 2022-01-13 ENCOUNTER — OFFICE VISIT (OUTPATIENT)
Dept: ENT CLINIC | Age: 77
End: 2022-01-13
Payer: MEDICARE

## 2022-01-13 VITALS — TEMPERATURE: 97.2 F | HEART RATE: 80 BPM | SYSTOLIC BLOOD PRESSURE: 114 MMHG | DIASTOLIC BLOOD PRESSURE: 73 MMHG

## 2022-01-13 DIAGNOSIS — H90.0 CONDUCTIVE HEARING LOSS OF BOTH EARS: ICD-10-CM

## 2022-01-13 DIAGNOSIS — H61.23 BILATERAL IMPACTED CERUMEN: Primary | ICD-10-CM

## 2022-01-13 PROCEDURE — 69210 REMOVE IMPACTED EAR WAX UNI: CPT | Performed by: OTOLARYNGOLOGY

## 2022-01-13 NOTE — PROGRESS NOTES
Chief Complaint   Patient presents with    Cerumen Impaction    Hearing Loss       HISTORY:    Bryce Loaiza stated that the hearing is decreased in both ears, which are plugged up with ear wax. EXAMINATION:    Both external ear canals were occluded by cerumen impaction, obscuring visualization of the TMs. PROCEDURE - REMOVAL OF BILATERAL CERUMEN IMPACTION:   The cerumen impaction was removed from both of the EACs, under otomicroscopy visualization, with instrumentation, using a Billeau wire loop. There was more cerumen on the left side. After successful cerumen removal, the EACs appeared to be normal and clear bilaterally without mass, exudate, or edema. The tympanic membranes appeared to be normal, including normal pneumatic mobility bilaterally. There was no evidence of acute disease. Bryce Loaiza reported improved hearing, back to usual level, after cerumen removal.            IMPRESSION / Preet Lopes / Brian Daniel / PROCEDURES:       Bryce Loaiza was seen today for cerumen impaction and hearing loss. Diagnoses and all orders for this visit:    Bilateral impacted cerumen    Conductive hearing loss of both ears        RECOMMENDATIONS / PLAN:   1. See Patient Instructions on file for this visit. 2. Return for recurrence of symptoms of excessive ear wax, or any other ear, nose, throat, or sinus problems.

## 2022-02-02 ENCOUNTER — OFFICE VISIT (OUTPATIENT)
Dept: FAMILY MEDICINE CLINIC | Age: 77
End: 2022-02-02
Payer: MEDICARE

## 2022-02-02 VITALS
SYSTOLIC BLOOD PRESSURE: 132 MMHG | HEART RATE: 78 BPM | OXYGEN SATURATION: 98 % | DIASTOLIC BLOOD PRESSURE: 68 MMHG | BODY MASS INDEX: 21.35 KG/M2 | HEIGHT: 62 IN | WEIGHT: 116 LBS | TEMPERATURE: 97.9 F | RESPIRATION RATE: 18 BRPM

## 2022-02-02 DIAGNOSIS — R73.01 IFG (IMPAIRED FASTING GLUCOSE): ICD-10-CM

## 2022-02-02 DIAGNOSIS — E78.00 HYPERCHOLESTEREMIA: ICD-10-CM

## 2022-02-02 DIAGNOSIS — A60.09 HERPES GENITALIS IN WOMEN: ICD-10-CM

## 2022-02-02 DIAGNOSIS — E55.9 VITAMIN D DEFICIENCY: ICD-10-CM

## 2022-02-02 DIAGNOSIS — G89.29 CHRONIC LEFT SHOULDER PAIN: ICD-10-CM

## 2022-02-02 DIAGNOSIS — M25.512 CHRONIC LEFT SHOULDER PAIN: ICD-10-CM

## 2022-02-02 DIAGNOSIS — E03.4 HYPOTHYROIDISM DUE TO ACQUIRED ATROPHY OF THYROID: Primary | ICD-10-CM

## 2022-02-02 LAB
A/G RATIO: 1.8 (ref 1.1–2.2)
ALBUMIN SERPL-MCNC: 4.6 G/DL (ref 3.4–5)
ALP BLD-CCNC: 97 U/L (ref 40–129)
ALT SERPL-CCNC: 14 U/L (ref 10–40)
ANION GAP SERPL CALCULATED.3IONS-SCNC: 14 MMOL/L (ref 3–16)
AST SERPL-CCNC: 21 U/L (ref 15–37)
BILIRUB SERPL-MCNC: 0.5 MG/DL (ref 0–1)
BUN BLDV-MCNC: 8 MG/DL (ref 7–20)
CALCIUM SERPL-MCNC: 9.8 MG/DL (ref 8.3–10.6)
CHLORIDE BLD-SCNC: 99 MMOL/L (ref 99–110)
CHOLESTEROL, TOTAL: 212 MG/DL (ref 0–199)
CO2: 24 MMOL/L (ref 21–32)
CREAT SERPL-MCNC: 0.6 MG/DL (ref 0.6–1.2)
GFR AFRICAN AMERICAN: >60
GFR NON-AFRICAN AMERICAN: >60
GLUCOSE BLD-MCNC: 106 MG/DL (ref 70–99)
HDLC SERPL-MCNC: 94 MG/DL (ref 40–60)
LDL CHOLESTEROL CALCULATED: 105 MG/DL
POTASSIUM SERPL-SCNC: 4.4 MMOL/L (ref 3.5–5.1)
SODIUM BLD-SCNC: 137 MMOL/L (ref 136–145)
T4 FREE: 1.2 NG/DL (ref 0.9–1.8)
TOTAL PROTEIN: 7.2 G/DL (ref 6.4–8.2)
TRIGL SERPL-MCNC: 63 MG/DL (ref 0–150)
TSH SERPL DL<=0.05 MIU/L-ACNC: 1.04 UIU/ML (ref 0.27–4.2)
VITAMIN D 25-HYDROXY: 55.9 NG/ML
VLDLC SERPL CALC-MCNC: 13 MG/DL

## 2022-02-02 PROCEDURE — G8420 CALC BMI NORM PARAMETERS: HCPCS | Performed by: NURSE PRACTITIONER

## 2022-02-02 PROCEDURE — 1123F ACP DISCUSS/DSCN MKR DOCD: CPT | Performed by: NURSE PRACTITIONER

## 2022-02-02 PROCEDURE — G8427 DOCREV CUR MEDS BY ELIG CLIN: HCPCS | Performed by: NURSE PRACTITIONER

## 2022-02-02 PROCEDURE — 1036F TOBACCO NON-USER: CPT | Performed by: NURSE PRACTITIONER

## 2022-02-02 PROCEDURE — 4040F PNEUMOC VAC/ADMIN/RCVD: CPT | Performed by: NURSE PRACTITIONER

## 2022-02-02 PROCEDURE — 99214 OFFICE O/P EST MOD 30 MIN: CPT | Performed by: NURSE PRACTITIONER

## 2022-02-02 PROCEDURE — 36415 COLL VENOUS BLD VENIPUNCTURE: CPT | Performed by: NURSE PRACTITIONER

## 2022-02-02 PROCEDURE — G8399 PT W/DXA RESULTS DOCUMENT: HCPCS | Performed by: NURSE PRACTITIONER

## 2022-02-02 PROCEDURE — 1090F PRES/ABSN URINE INCON ASSESS: CPT | Performed by: NURSE PRACTITIONER

## 2022-02-02 PROCEDURE — G8484 FLU IMMUNIZE NO ADMIN: HCPCS | Performed by: NURSE PRACTITIONER

## 2022-02-02 RX ORDER — ACYCLOVIR 400 MG/1
400 TABLET ORAL DAILY
Qty: 90 TABLET | Refills: 3 | Status: SHIPPED | OUTPATIENT
Start: 2022-02-02 | End: 2022-09-30 | Stop reason: SDUPTHER

## 2022-02-02 RX ORDER — LEVOTHYROXINE AND LIOTHYRONINE 57; 13.5 UG/1; UG/1
TABLET ORAL
Qty: 15 TABLET | Refills: 6 | Status: SHIPPED | OUTPATIENT
Start: 2022-02-02 | End: 2022-09-23

## 2022-02-02 ASSESSMENT — ENCOUNTER SYMPTOMS
VOMITING: 0
WHEEZING: 0
NAUSEA: 0
ABDOMINAL PAIN: 0
SHORTNESS OF BREATH: 0
DIARRHEA: 0
EYE REDNESS: 0
COUGH: 0
ABDOMINAL DISTENTION: 0
SINUS PRESSURE: 0
EYE DISCHARGE: 0
EYE PAIN: 0
SINUS PAIN: 0
SORE THROAT: 0

## 2022-02-02 NOTE — PROGRESS NOTES
Twan Canseco (:  1945) is a 67 y.o. female,Established patient, here for evaluation of the following chief complaint(s):  Hypothyroidism (ROUTINE THYROID MED REFILL )      ASSESSMENT/PLAN:  1. Hypothyroidism due to acquired atrophy of thyroid  -Controlled on current dose of [place] Thyroid. Refill medication. Due for labs. No changes pending results. -     thyroid (ARMOUR THYROID) 90 MG tablet; TAKE ONE TABLET BY MOUTH EVERY OTHER DAY, Disp-15 tablet, R-6Normal  -     TSH without Reflex; Future  -     T4, Free; Future  -     Comprehensive Metabolic Panel; Future  2. Herpes genitalis in women  -Controlled with acyclovir. No changes today. Refill.  -     acyclovir (ZOVIRAX) 400 MG tablet; Take 1 tablet by mouth daily, Disp-90 tablet, R-3Normal  3. Chronic left shoulder pain  -Presentation likely due to arthritis. The patient has full range of motion. Discussed steroid injection versus exercises. The patient would like to focus on exercise. Encouraged to get back to the [facility] when she feels comfortable. Try to perform shoulder exercises while at home. Follow-up as needed. 4. Hypercholesteremia  -     Lipid Panel; Future  -     Comprehensive Metabolic Panel; Future  5. Vitamin D deficiency  -     Vitamin D 25 Hydroxy; Future  6. IFG (impaired fasting glucose)  -     Hemoglobin A1C; Future      Return in about 8 months (around 2022) for Annual Wellness Visit. SUBJECTIVE/OBJECTIVE:  KEERTHI Turcios presents today for thyroid follow-up. She is inquiring about left shoulder pain which she has had for quite some time. Worsened slightly since her fall in December. She states that the pain comes and goes. She does note that she has not been able to exercise at the [facility] in a while. She is inquiring if she should be concerned for significant injury. Dilcia Turcios continues to tolerate her thyroid medication without issue. Denies any cold intolerance, fragile hair, or fragile nails.   She also continues to take acyclovir for herpes. She needs refills on both of these. She is fasting for labs today. She states that the area above her eye has been continuing to heal without issue. Went to an eye provider to have her eyes checked. States that there was no effect from the fall on her vision. Review of Systems   Constitutional: Negative for chills and fever. HENT: Negative for ear discharge, ear pain, hearing loss, sinus pressure, sinus pain and sore throat. Eyes: Negative for pain, discharge and redness. Respiratory: Negative for cough, shortness of breath and wheezing. Cardiovascular: Negative for chest pain and palpitations. Gastrointestinal: Negative for abdominal distention, abdominal pain, diarrhea, nausea and vomiting. Genitourinary: Negative for dysuria and hematuria. Musculoskeletal: Positive for arthralgias. Negative for myalgias. Skin: Negative for rash. Neurological: Negative for dizziness, weakness, light-headedness, numbness and headaches. Psychiatric/Behavioral: Negative for decreased concentration, dysphoric mood and sleep disturbance. The patient is nervous/anxious. Physical Exam  Constitutional:       General: She is not in acute distress. Appearance: Normal appearance. She is normal weight. HENT:      Head: Normocephalic and atraumatic. Right Ear: Tympanic membrane, ear canal and external ear normal.      Left Ear: Tympanic membrane, ear canal and external ear normal.      Mouth/Throat:      Mouth: Mucous membranes are moist.   Eyes:      Extraocular Movements: Extraocular movements intact. Conjunctiva/sclera: Conjunctivae normal.      Pupils: Pupils are equal, round, and reactive to light. Neck:      Thyroid: No thyromegaly. Vascular: No carotid bruit. Cardiovascular:      Rate and Rhythm: Normal rate and regular rhythm. Pulses: Normal pulses. Heart sounds: Normal heart sounds. No murmur heard. No gallop.     Pulmonary:      Effort: Pulmonary effort is normal.      Breath sounds: Normal breath sounds. Abdominal:      General: Bowel sounds are normal.      Palpations: Abdomen is soft. Tenderness: There is no abdominal tenderness. There is no guarding or rebound. Musculoskeletal:         General: No swelling or tenderness. Normal range of motion. Cervical back: Normal range of motion and neck supple. Lymphadenopathy:      Cervical: No cervical adenopathy. Skin:     General: Skin is warm and dry. Capillary Refill: Capillary refill takes less than 2 seconds. Neurological:      Mental Status: She is alert and oriented to person, place, and time. Psychiatric:         Mood and Affect: Mood normal.         Behavior: Behavior normal.         Thought Content: Thought content normal.         Judgment: Judgment normal.               This dictation was generated by voice recognition computer software. Although all attempts are made to edit the dictation for accuracy, there may be errors in the transcription that are not intended. An electronic signature was used to authenticate this note.     --GRISELDA Arana - CNP

## 2022-02-03 LAB
ESTIMATED AVERAGE GLUCOSE: 116.9 MG/DL
HBA1C MFR BLD: 5.7 %

## 2022-05-04 ENCOUNTER — OFFICE VISIT (OUTPATIENT)
Dept: FAMILY MEDICINE CLINIC | Age: 77
End: 2022-05-04
Payer: MEDICARE

## 2022-05-04 VITALS
OXYGEN SATURATION: 98 % | HEART RATE: 74 BPM | BODY MASS INDEX: 21.9 KG/M2 | WEIGHT: 119 LBS | SYSTOLIC BLOOD PRESSURE: 110 MMHG | DIASTOLIC BLOOD PRESSURE: 68 MMHG | HEIGHT: 62 IN

## 2022-05-04 DIAGNOSIS — J30.1 SEASONAL ALLERGIC RHINITIS DUE TO POLLEN: Primary | ICD-10-CM

## 2022-05-04 PROCEDURE — 1036F TOBACCO NON-USER: CPT | Performed by: NURSE PRACTITIONER

## 2022-05-04 PROCEDURE — G8420 CALC BMI NORM PARAMETERS: HCPCS | Performed by: NURSE PRACTITIONER

## 2022-05-04 PROCEDURE — G8399 PT W/DXA RESULTS DOCUMENT: HCPCS | Performed by: NURSE PRACTITIONER

## 2022-05-04 PROCEDURE — 4040F PNEUMOC VAC/ADMIN/RCVD: CPT | Performed by: NURSE PRACTITIONER

## 2022-05-04 PROCEDURE — 1090F PRES/ABSN URINE INCON ASSESS: CPT | Performed by: NURSE PRACTITIONER

## 2022-05-04 PROCEDURE — 1123F ACP DISCUSS/DSCN MKR DOCD: CPT | Performed by: NURSE PRACTITIONER

## 2022-05-04 PROCEDURE — G8427 DOCREV CUR MEDS BY ELIG CLIN: HCPCS | Performed by: NURSE PRACTITIONER

## 2022-05-04 PROCEDURE — 99213 OFFICE O/P EST LOW 20 MIN: CPT | Performed by: NURSE PRACTITIONER

## 2022-05-04 RX ORDER — CETIRIZINE HYDROCHLORIDE 10 MG/1
10 TABLET ORAL DAILY
Qty: 90 TABLET | Refills: 0 | Status: SHIPPED | OUTPATIENT
Start: 2022-05-04

## 2022-05-04 ASSESSMENT — ENCOUNTER SYMPTOMS
SINUS PAIN: 0
SORE THROAT: 1
WHEEZING: 0
SHORTNESS OF BREATH: 0
RHINORRHEA: 1
SINUS PRESSURE: 0

## 2022-05-04 ASSESSMENT — PATIENT HEALTH QUESTIONNAIRE - PHQ9
4. FEELING TIRED OR HAVING LITTLE ENERGY: 0
3. TROUBLE FALLING OR STAYING ASLEEP: 0
SUM OF ALL RESPONSES TO PHQ QUESTIONS 1-9: 0
5. POOR APPETITE OR OVEREATING: 0
SUM OF ALL RESPONSES TO PHQ QUESTIONS 1-9: 0
1. LITTLE INTEREST OR PLEASURE IN DOING THINGS: 0
SUM OF ALL RESPONSES TO PHQ QUESTIONS 1-9: 0
SUM OF ALL RESPONSES TO PHQ9 QUESTIONS 1 & 2: 0
2. FEELING DOWN, DEPRESSED OR HOPELESS: 0
SUM OF ALL RESPONSES TO PHQ QUESTIONS 1-9: 0
6. FEELING BAD ABOUT YOURSELF - OR THAT YOU ARE A FAILURE OR HAVE LET YOURSELF OR YOUR FAMILY DOWN: 0
8. MOVING OR SPEAKING SO SLOWLY THAT OTHER PEOPLE COULD HAVE NOTICED. OR THE OPPOSITE, BEING SO FIGETY OR RESTLESS THAT YOU HAVE BEEN MOVING AROUND A LOT MORE THAN USUAL: 0
7. TROUBLE CONCENTRATING ON THINGS, SUCH AS READING THE NEWSPAPER OR WATCHING TELEVISION: 0
10. IF YOU CHECKED OFF ANY PROBLEMS, HOW DIFFICULT HAVE THESE PROBLEMS MADE IT FOR YOU TO DO YOUR WORK, TAKE CARE OF THINGS AT HOME, OR GET ALONG WITH OTHER PEOPLE: 0
9. THOUGHTS THAT YOU WOULD BE BETTER OFF DEAD, OR OF HURTING YOURSELF: 0

## 2022-05-04 NOTE — PROGRESS NOTES
Sadia Cabrales (:  1945) is a 67 y.o. female,Established patient, here for evaluation of the following chief complaint(s):  Pharyngitis (NASAL DRAINAGE, THROAT DID HURT. MUCUS. )      ASSESSMENT/PLAN:  1. Seasonal allergic rhinitis due to pollen  -Presentation is consistent with allergic rhinitis. Encourage patient to take cetirizine. Looks like it is covered on the patient's plans will send the prescription. Educated on medication use as well as side effects. Did recommend daily use, but patient would prefer not to do this. Recommended at a minimum taking the day that she cuts grass and the subsequent days after. Follow-up as needed. -     cetirizine (ZYRTEC) 10 MG tablet; Take 1 tablet by mouth daily, Disp-90 tablet, R-0Normal      Return in about 5 months (around 2022) for Annual Wellness Visit. SUBJECTIVE/OBJECTIVE:  KEERTHI Ronquillo presents today for evaluation of nasal drainage, sore throat, and mucus. She states that she mowed the grass on . She developed symptoms after this. She states that she took some Mucinex yesterday which did help. Symptoms are almost alleviated today. She does not take any seasonal allergy medication. States that this typically is not an issue unless she cuts grass. Denies any fevers or chills. Denies any shortness of breath. Review of Systems   Constitutional: Negative for chills and fever. HENT: Positive for congestion, postnasal drip, rhinorrhea and sore throat. Negative for ear discharge, ear pain, sinus pressure and sinus pain. Respiratory: Negative for shortness of breath and wheezing. Cardiovascular: Negative for chest pain, palpitations and leg swelling. Neurological: Negative for dizziness, weakness, light-headedness, numbness and headaches. Psychiatric/Behavioral: Negative for decreased concentration, dysphoric mood, self-injury, sleep disturbance and suicidal ideas. The patient is not nervous/anxious.         Physical Exam  Constitutional:       General: She is not in acute distress. Appearance: Normal appearance. She is normal weight. HENT:      Right Ear: Tympanic membrane, ear canal and external ear normal. There is no impacted cerumen. Left Ear: Tympanic membrane, ear canal and external ear normal. There is no impacted cerumen. Nose: Rhinorrhea present. No congestion. Cardiovascular:      Pulses: Normal pulses. Heart sounds: Normal heart sounds. No murmur heard. No gallop. Pulmonary:      Effort: Pulmonary effort is normal.      Breath sounds: Normal breath sounds. No wheezing. Neurological:      Mental Status: She is alert and oriented to person, place, and time. Psychiatric:         Mood and Affect: Mood normal.         Behavior: Behavior normal.         Thought Content: Thought content normal.         Judgment: Judgment normal.               This dictation was generated by voice recognition computer software. Although all attempts are made to edit the dictation for accuracy, there may be errors in the transcription that are not intended. An electronic signature was used to authenticate this note.     --GRISELDA Marks - CNP

## 2022-05-04 NOTE — PATIENT INSTRUCTIONS
Patient Education        cetirizine (oral/injection)  Pronunciation: se LEE chavez zelars  Brand: All Day Allergy, All Day Allergy Children's, Aller-Shanice, Indoor/Outdoor AllergyRelief, Quzyttir, ZyrTEC  What is the most important information I should know about cetirizine? Before using cetirizine tell your doctor about all your medical conditions orallergies, all medicines you use, and if you are pregnant or breastfeeding. What is cetirizine? Cetirizine is an antihistamine that reduces the effects of natural chemical histamine in the body. Histamine can produce symptoms of sneezing, itching,watery eyes, and runny nose. Cetirizine oral is used in adults and children to treat cold or allergy symptoms such assneezing, itching, watery eyes, or runny nose. Cetirizine injection is used to treat hives (urticaria) in adults and children at least 6 monthsold. Cetirizine may also be used for purposes not listed in this medication guide. What should I discuss with my healthcare provider before using cetirizine? You should not use this medicine if you are allergic to cetirizine orlevocetirizine. Cetirizine injection  is not for use in children young than 10years old who have liver or kidneydisease. Ask a doctor or pharmacist if it is safe for you to take cetirizine oral if you have any medical conditions. Ask a doctor before using this medicine if you are pregnant or breastfeeding. Before you are treated with cetirizine injection in an emergency, you may not be able to tell caregivers about your health conditions. Make sure any doctor caring for you afterward knows you receivedthis medicine. How should I use cetirizine? Cetirizine oral is taken by mouth. Cetirizine injection is given as an infusion into a vein. A healthcare provider will give you thisinjection once every 24 hours as needed to treat hives. Use cetirizine oral exactly as directed on the label, or as prescribed by your doctor.   Older adults may need to take a lower than normal dose. Follow your doctor'sinstructions. You may take cetirizine with or without food. You must chew the chewable tablet before you swallow it. Do not swallow the dissolving tablet whole. Allow it to dissolve in your mouth without chewing. Swallow several times as the tablet dissolves. If desired, you may drink liquid to help swallowthe dissolved tablet. Measure liquid medicine carefully. Use the dosing syringe provided, or use a medicine dose-measuringdevice (not a kitchen spoon). Call your doctor if your symptoms do not improve, if they get worse, or if youalso have a fever. Store at room temperature away from moisture and heat. Do not allow liquidmedicine to freeze. What happens if I miss a dose? Take the medicine as soon as you can, but skip the missed dose if it is almost time for your next dose. Do not take two doses at one time. What happens if I overdose? Seek emergency medical attention or call the Poison Help line at 1-239.905.6805. Overdose symptoms may include extreme drowsiness, vision problems, agitation, feeling restless and then drowsy or tired, fast heartbeats, stomach pain,nausea, vomiting, trouble walking, or trouble swallowing or speaking. What should I avoid while using cetirizine? Avoid driving or hazardous activity until you know how this medicine willaffect you. Your reactions could be impaired. Avoid drinking alcohol while taking cetirizine. What are the possible side effects of cetirizine? Get emergency medical help if you have signs of an allergic reaction: hives; difficult breathing; swelling of your face, lips, tongue, or throat. Stop taking this medicine and call your doctor at once if you have:   fast, pounding, or uneven heartbeat;   weakness, tremors (uncontrolled shaking), or sleep problems (insomnia);   severe restless feeling, hyperactivity;   confusion;   problems with vision; or   little or no urination.   Common side effects may include:   drowsiness, tiredness;   dizziness, feeling light-headed;   feeling hot, sweating;   numbness, tingling, burning pain;   decreased sense of taste;   headache;   upset stomach, nausea, constipation; or   dry mouth, sore throat. This is not a complete list of side effects and others may occur. Call your doctor for medical advice about side effects. You may report side effects toFDA at 2-590-RZT-2485. What other drugs will affect cetirizine? Using cetirizine with other drugs that make you drowsy can worsen this effect. Ask your doctor before using opioid medication, a sleeping pill, a musclerelaxer, or medicine for anxiety or seizures. Other drugs may affect cetirizine, including prescription and over-the-counter medicines, vitamins, and herbal products. Tell your doctor about all yourcurrent medicines and any medicine you start or stop using. Where can I get more information? Your pharmacist can provide more information about cetirizine. Remember, keep this and all other medicines out of the reach of children, never share your medicines with others, and use this medication only for the indication prescribed. Every effort has been made to ensure that the information provided by [address] is accurate, up-to-date, and complete, but no guarantee is made to that effect. Drug information contained herein may be time sensitive. [employer] information has been compiled for use by healthcare practitioners and consumers in the [place] and therefore [employer] does not warrant that uses outside of the [place] are appropriate, unless specifically indicated otherwise. [employer] drug information does not endorse drugs, diagnose patients or recommend therapy.  [employer] drug information is an informational resource designed to assist licensed healthcare practitioners in caring for their patients and/or to serve consumers viewing this service as a supplement to, and not a substitute for, the expertise, skill, knowledge and judgment of healthcare practitioners. The absence of a warning for a given drug or drug combination in no way should be construed to indicate that the drug or drug combination is safe, effective or appropriate for any given patient. [facility] does not assume any responsibility for any aspect of healthcare administered with the aid of information [facility] provides. The information contained herein is not intended to cover all possible uses, directions, precautions, warnings, drug interactions, allergic reactions, or adverse effects. If you have questions about the drugs you are taking, check with yourdoctor, nurse or pharmacist.  Copyright 6333-7801 [address]: 12.01. Revision date: 5/6/2020. Care instructions adapted under license by [facility] ([facility]). If you have questions about a medical condition or this instruction, always ask your healthcare professional. [place] [address] any warranty or liability for your use of this information.

## 2022-05-24 ENCOUNTER — TELEPHONE (OUTPATIENT)
Dept: FAMILY MEDICINE CLINIC | Age: 77
End: 2022-05-24

## 2022-05-24 DIAGNOSIS — B37.9 ANTIBIOTIC-INDUCED YEAST INFECTION: ICD-10-CM

## 2022-05-24 DIAGNOSIS — T36.95XA ANTIBIOTIC-INDUCED YEAST INFECTION: ICD-10-CM

## 2022-05-24 RX ORDER — FLUCONAZOLE 150 MG/1
150 TABLET ORAL
Qty: 2 TABLET | Refills: 0 | Status: SHIPPED | OUTPATIENT
Start: 2022-05-24 | End: 2022-05-30

## 2022-05-24 NOTE — TELEPHONE ENCOUNTER
Patients dentist is prescribing her an antibiotic and she is requesting a script from lisa for diflucan.       [address] [phone] Joey Galvan, [address] [facility] [address]

## 2022-05-24 NOTE — TELEPHONE ENCOUNTER
[facility] [id number] Clerance Kayser, [address]   [address] [facility], [address] [place] [address]   Phone:  [phone]  Fax:  [phone]

## 2022-07-13 ENCOUNTER — OFFICE VISIT (OUTPATIENT)
Dept: ENT CLINIC | Age: 77
End: 2022-07-13
Payer: MEDICARE

## 2022-07-13 VITALS — TEMPERATURE: 97.1 F | HEART RATE: 68 BPM | DIASTOLIC BLOOD PRESSURE: 73 MMHG | SYSTOLIC BLOOD PRESSURE: 118 MMHG

## 2022-07-13 DIAGNOSIS — H90.0 CONDUCTIVE HEARING LOSS OF BOTH EARS: ICD-10-CM

## 2022-07-13 DIAGNOSIS — H61.23 IMPACTED CERUMEN OF BOTH EARS: Primary | ICD-10-CM

## 2022-07-13 PROCEDURE — 69210 REMOVE IMPACTED EAR WAX UNI: CPT | Performed by: OTOLARYNGOLOGY

## 2022-07-13 NOTE — PATIENT INSTRUCTIONS
1. Schedule an audiogram (hearing test), if your hearing is not back to your usual ability, or if hearing loss or tinnitus (ringing or other noise) persists, despite removal of ear wax,.  2. Schedule an appointment for ear recheck and possible cleaning in the future if your hearing is decreased, or you have a sensation of ear wax build up or are told you have ear wax build up by your primary physician or other health care provider. 3. You may use an over the counter ear wax removal kit (such as Murine, Bausch and Lomb, NeilMed, or Debrox wax removal system) for ear wax removal, as needed. 4. It may help to use Debrox (OTC) for 4 days prior to future visits for ear cleaning. This may soften your ear wax and facilitate removal of the wax. NO Q-TIPS OR OTHER INSTRUMENTS/OBJECTS IN THE EARS   You should never clean your ears with a Q-tip, cotton tipped applicator, Jesenia pin, paper clip, safety pin, pen cap, or any other instrument. This will tend to push wax in deeper and pack the ear canal with wax. There is a high risk and danger of this practice, especially rupture of ear drum, dislocation or other damage to ossicles, and permanent, irreversible, and irreparable hearing loss. It may cause inflammation and irritation of the ear canal and cause itching or pain. I recommend only use of one the several ear wax removal kits available \"over the counter\" if you feel a need to try to remove ear wax. For example, Murine, Bausch and Lomb, NeilMed, or Debrox ear wax removal kits may be used for ear wax removal, as needed. No other methods should be self used for cleaning your ears.

## 2022-07-28 ENCOUNTER — OFFICE VISIT (OUTPATIENT)
Dept: FAMILY MEDICINE CLINIC | Age: 77
End: 2022-07-28
Payer: MEDICARE

## 2022-07-28 VITALS
SYSTOLIC BLOOD PRESSURE: 118 MMHG | HEART RATE: 70 BPM | OXYGEN SATURATION: 96 % | WEIGHT: 115 LBS | DIASTOLIC BLOOD PRESSURE: 70 MMHG | BODY MASS INDEX: 21.16 KG/M2 | HEIGHT: 62 IN

## 2022-07-28 DIAGNOSIS — L20.89 OTHER ATOPIC DERMATITIS: Primary | ICD-10-CM

## 2022-07-28 PROCEDURE — 1123F ACP DISCUSS/DSCN MKR DOCD: CPT | Performed by: NURSE PRACTITIONER

## 2022-07-28 PROCEDURE — 1036F TOBACCO NON-USER: CPT | Performed by: NURSE PRACTITIONER

## 2022-07-28 PROCEDURE — 1090F PRES/ABSN URINE INCON ASSESS: CPT | Performed by: NURSE PRACTITIONER

## 2022-07-28 PROCEDURE — G8427 DOCREV CUR MEDS BY ELIG CLIN: HCPCS | Performed by: NURSE PRACTITIONER

## 2022-07-28 PROCEDURE — 99213 OFFICE O/P EST LOW 20 MIN: CPT | Performed by: NURSE PRACTITIONER

## 2022-07-28 PROCEDURE — G8420 CALC BMI NORM PARAMETERS: HCPCS | Performed by: NURSE PRACTITIONER

## 2022-07-28 PROCEDURE — G8399 PT W/DXA RESULTS DOCUMENT: HCPCS | Performed by: NURSE PRACTITIONER

## 2022-07-28 ASSESSMENT — ENCOUNTER SYMPTOMS
SHORTNESS OF BREATH: 0
WHEEZING: 0

## 2022-07-28 NOTE — PROGRESS NOTES
Ruperto Walker (:  1945) is a 67 y.o. female,Established patient, here for evaluation of the following chief complaint(s):  Rash (RASH AROUND NOSE FEELS BURNT SHE SWITCHED FACIAL CREAM AND THINKS IT IS CAUSING IT BUT SHE HAS NOT STOPPED USING, )      ASSESSMENT/PLAN:  1. Other atopic dermatitis  -Presentation is consistent with atopic dermatitis. Could be due to the facial cream.  Hydrocortisone is being sent to the pharmacy. Educated on medication use as well as side effects. Follow-up as needed if no improvement. -     hydrocortisone 2.5 % cream; Apply topically 2 times daily. , Disp-20 g, R-0, Normal    Return in about 2 months (around 2022), or if symptoms worsen or fail to improve, for [facility] Annual Wellness Visit. SUBJECTIVE/OBJECTIVE:  KEERTHI Mcnamara presents today for evaluation of a rash to the folds of her nose. She states that the rash has been present for a few days. She states that it seemed to correlate with starting a new facial cream.  She states that the cream that she used to use was out of stock at the store. She states she has stopped using the facial cream.  Worried it may also be related to the sun. Denies any significant congestion or need to rub the nose. Review of Systems   Constitutional:  Negative for chills and fever. Respiratory:  Negative for shortness of breath and wheezing. Cardiovascular:  Negative for chest pain, palpitations and leg swelling. Skin:  Positive for rash. Neurological:  Negative for dizziness, weakness, light-headedness, numbness and headaches. Psychiatric/Behavioral:  Negative for decreased concentration, dysphoric mood, self-injury, sleep disturbance and suicidal ideas. The patient is not nervous/anxious. Physical Exam  Constitutional:       General: She is not in acute distress. Appearance: Normal appearance. She is normal weight. Skin:     Findings: Rash present.       Comments: Erythematous rash to the alar creases of the nose   Neurological:      Mental Status: She is alert and oriented to person, place, and time. Psychiatric:         Mood and Affect: Mood normal.         Behavior: Behavior normal.         Thought Content: Thought content normal.         Judgment: Judgment normal.             This dictation was generated by voice recognition computer software. Although all attempts are made to edit the dictation for accuracy, there may be errors in the transcription that are not intended. An electronic signature was used to authenticate this note.     --GRISELDA Myers - CNP

## 2022-08-01 ENCOUNTER — OFFICE VISIT (OUTPATIENT)
Dept: ENT CLINIC | Age: 77
End: 2022-08-01
Payer: MEDICARE

## 2022-08-01 ENCOUNTER — PROCEDURE VISIT (OUTPATIENT)
Dept: AUDIOLOGY | Age: 77
End: 2022-08-01
Payer: MEDICARE

## 2022-08-01 VITALS
SYSTOLIC BLOOD PRESSURE: 128 MMHG | HEART RATE: 66 BPM | TEMPERATURE: 97.7 F | OXYGEN SATURATION: 97 % | DIASTOLIC BLOOD PRESSURE: 58 MMHG

## 2022-08-01 DIAGNOSIS — H90.3 BILATERAL SENSORINEURAL HEARING LOSS: Primary | Chronic | ICD-10-CM

## 2022-08-01 DIAGNOSIS — H90.3 SENSORINEURAL HEARING LOSS (SNHL) OF BOTH EARS: Primary | ICD-10-CM

## 2022-08-01 PROCEDURE — 92567 TYMPANOMETRY: CPT | Performed by: AUDIOLOGIST

## 2022-08-01 PROCEDURE — G8427 DOCREV CUR MEDS BY ELIG CLIN: HCPCS | Performed by: OTOLARYNGOLOGY

## 2022-08-01 PROCEDURE — 99213 OFFICE O/P EST LOW 20 MIN: CPT | Performed by: OTOLARYNGOLOGY

## 2022-08-01 PROCEDURE — 1123F ACP DISCUSS/DSCN MKR DOCD: CPT | Performed by: OTOLARYNGOLOGY

## 2022-08-01 PROCEDURE — 1036F TOBACCO NON-USER: CPT | Performed by: OTOLARYNGOLOGY

## 2022-08-01 PROCEDURE — G8399 PT W/DXA RESULTS DOCUMENT: HCPCS | Performed by: OTOLARYNGOLOGY

## 2022-08-01 PROCEDURE — G8420 CALC BMI NORM PARAMETERS: HCPCS | Performed by: OTOLARYNGOLOGY

## 2022-08-01 PROCEDURE — 1090F PRES/ABSN URINE INCON ASSESS: CPT | Performed by: OTOLARYNGOLOGY

## 2022-08-01 PROCEDURE — 92557 COMPREHENSIVE HEARING TEST: CPT | Performed by: AUDIOLOGIST

## 2022-08-01 NOTE — PROGRESS NOTES
[facility]  Division of Audiology/Otolaryngology    8/1/2022     Patient name: Omar Uribe  Primary Care Physician: GRISELDA Crandall CNP   Medical Record Number: [id number]     Omar Uribe   1945, 67 y.o. female   [id number]       Referring Provider: Delmer Horn MD  Referral Type: In an order in [address] [place] [address]    Reason for Visit: Evaluation of the cause of disorders of hearing, tinnitus, or balance. ADULT AUDIOLOGIC EVALUATION                    Omar Uribe is a 67 y.o. female seen today, 8/1/2022 , for a same-day audiologic evaluation. Patient was seen by Delmer Horn MD following today's evaluation. AUDIOLOGIC AND OTHER PERTINENT MEDICAL HISTORY:      Omar Uribe noted history of hearing loss. She is interested in amplification, but reluctant to pursue due to costs. Head injury is noted, had a fall while in the shower. Fall notes from HIPOLITO Starks 9/17/2021  Pt had a fall 6 days ago in the bathtub. Pt reports she hit the back of her head. NO loss of consciousness. NO dizziness, change in speech, no change in vision, no nausea, no vomiting. Pt just wanted to come in to be seen but overall feels fine besides some achiness. Bruising noted on physical exam to base of neck and back of head. IMPRESSIONS:      Today's results are consistent with bilateral sensorineural hearing loss with excellent word recognition for both ears, with reduced eardrum compliance from right ear, normal compliance from left ear. Hearing loss is significant enough to result in difficulty understanding speech in most listening environments. Recommended hearing aid evaluation. Patient to follow medical recommendations per  Delmer Horn MD.     ASSESSMENT AND FINDINGS:     Otoscopy revealed: Visible tympanic membrane bilaterally    Reliability: Good   Transducer: Inserts    RIGHT EAR:  Hearing Sensitivity: Normal to moderately severe sensorineural hearing loss.   Speech

## 2022-08-01 NOTE — PATIENT INSTRUCTIONS
You should consider amplification therapy, hearing aid, if you are having difficulty communicating and/or hearing important sounds. You may follow up with Dr. Sweta Dupont or Dr. Lety Santos or with another hearing aid provider of your choice. You will probably have a decrease in understanding of speech in the presence of background noise and expected difficulty understanding speech in the presence of moderate to high level of background noise. This is typical of your type of hearing loss. You should return if your ears plug up with ear wax causing difficulty hearing or pressure. Most patients who use hearing aids, will need their ears cleaned every 6 to 12 months. You may use an over the counter ear wax removal kit (such as Murine, Bausch and Lomb, NeilMed, or Debrox wax removal system) for ear wax removal, as needed. It may help to use Debrox (OTC) for 4 days prior to future visits for ear cleaning. This may soften your ear wax and facilitate removal of the wax. You should return for an annual hearing test to monitor your hearing, and whenever your hearing further decreases significantly. You should employ the tinnitus masking techniques and strategies we discussed, as needed. Bedside tinnitus masking devices (e.g. a white noise machine, noise machine, noise sruthi on your cell phone, fan on in the room, radio with light music or tuned between stations to white noise static) can be very helpful. You should avoid exposure to excessively high levels of noise/sound and use hearing protection measures we discussed, as needed, if such exposure is unavoidable. NO Q-TIPS IN THE EARS  You should never clean your ears with a Q-tip, cotton tipped applicator, Jesenia pin, paper clip, pen cap, nail file, or any other instrument. I recommend only use of one the several ear wax removal kits available \"over the counter\" (eg. Bausch and Lomb or Murine, or The Carol) if you feel a need to try to remove ear wax.   No other methods should be self used for this purpose as there is danger of injury to the ear and risk of irreparable and irreversible permanent hearing loss and permanent dizziness.

## 2022-08-03 ENCOUNTER — TELEPHONE (OUTPATIENT)
Dept: FAMILY MEDICINE CLINIC | Age: 77
End: 2022-08-03

## 2022-08-03 NOTE — TELEPHONE ENCOUNTER
HER FRIEND WAS ON A CRUISE AND GOT COVID - SHE IS OVER IT NOW - BUT HAD BROUGHT A BAG OF CANDY HOME FOR SOFI - SHE IS WANTING TO KNOW IF SHE SHOULD JUST THROW IT AWAY    PT @  [phone]

## 2022-08-24 ENCOUNTER — OFFICE VISIT (OUTPATIENT)
Dept: FAMILY MEDICINE CLINIC | Age: 77
End: 2022-08-24
Payer: MEDICARE

## 2022-08-24 VITALS
OXYGEN SATURATION: 98 % | HEIGHT: 62 IN | SYSTOLIC BLOOD PRESSURE: 106 MMHG | DIASTOLIC BLOOD PRESSURE: 64 MMHG | BODY MASS INDEX: 20.8 KG/M2 | WEIGHT: 113 LBS | HEART RATE: 75 BPM

## 2022-08-24 DIAGNOSIS — L23.9 ALLERGIC DERMATITIS: Primary | ICD-10-CM

## 2022-08-24 DIAGNOSIS — Z71.2 ENCOUNTER TO DISCUSS TEST RESULTS: ICD-10-CM

## 2022-08-24 PROCEDURE — 1090F PRES/ABSN URINE INCON ASSESS: CPT | Performed by: NURSE PRACTITIONER

## 2022-08-24 PROCEDURE — 1036F TOBACCO NON-USER: CPT | Performed by: NURSE PRACTITIONER

## 2022-08-24 PROCEDURE — G8420 CALC BMI NORM PARAMETERS: HCPCS | Performed by: NURSE PRACTITIONER

## 2022-08-24 PROCEDURE — 1123F ACP DISCUSS/DSCN MKR DOCD: CPT | Performed by: NURSE PRACTITIONER

## 2022-08-24 PROCEDURE — G8427 DOCREV CUR MEDS BY ELIG CLIN: HCPCS | Performed by: NURSE PRACTITIONER

## 2022-08-24 PROCEDURE — G8399 PT W/DXA RESULTS DOCUMENT: HCPCS | Performed by: NURSE PRACTITIONER

## 2022-08-24 PROCEDURE — 99213 OFFICE O/P EST LOW 20 MIN: CPT | Performed by: NURSE PRACTITIONER

## 2022-08-24 ASSESSMENT — ENCOUNTER SYMPTOMS
WHEEZING: 0
SHORTNESS OF BREATH: 0

## 2022-08-24 NOTE — PROGRESS NOTES
Zulema Smith (:  1945) is a 67 y.o. female,Established patient, here for evaluation of the following chief complaint(s):  Rash (RASH ON SIDE OF NECK, STARTED ON Monday AFTER CUTTING GRASS )      ASSESSMENT/PLAN:  1. Allergic dermatitis  -Presentation is consistent with allergic dermatitis. Likely due to either an insect bite or due to foliage from cutting grass. Since the patient has seen improvement with the hydrocortisone cream, encouraged to continue to use twice daily. Follow-up as needed if no improvement. Could consider a stronger topical steroid at that time. 2. Encounter to discuss test results  -[employer] screening results were reviewed. Scanned to media. The patient had normal carotid arteries, AAA screening was negative, and ankle-brachial index was normal.  Osteoporosis screening did show thinning of the bone of the shin, but educated to the patient that she has a known history of osteoporosis as shown on DEXA scan. Last DEXA scan was in . The patient has been taking calcium and vitamin D supplements. Did recommend discussing possible repeat of the DEXA scan at her annual wellness visit with Dr. Yojana Banda next month. Return in about 5 weeks (around 2022) for Annual Wellness Visit. SUBJECTIVE/OBJECTIVE:  KEERTHI  Phu Bass presents today for evaluation of a rash to the right side of her neck. She states it has been present since Monday. She states that that day she was cutting grass. She tried to help cut the neighbors grass as well and states that when she went to cut it a bunch of bugs flew up. May have gotten bit. She has some leftover hydrocortisone from the rash that was on her face. This rash is completely went away, and she states she tried to use some of the hydrocortisone cream to the area on her neck. Does feel that it did help with symptoms. It was initially very red and she states the cream helped take away the redness. She states it is still itchy.     Since last being seen, Amanda Martinez did perform a [employer] biometric screening. She has brought in the results today to discuss and add to her chart. Review of Systems   Constitutional:  Negative for chills and fever. Respiratory:  Negative for shortness of breath and wheezing. Cardiovascular:  Negative for chest pain, palpitations and leg swelling. Skin:  Positive for rash. Neurological:  Negative for dizziness, weakness, light-headedness, numbness and headaches. Psychiatric/Behavioral:  Negative for decreased concentration, dysphoric mood, self-injury, sleep disturbance and suicidal ideas. The patient is not nervous/anxious. Physical Exam  Constitutional:       General: She is not in acute distress. Appearance: Normal appearance. She is normal weight. Skin:     Findings: Rash present. Comments: Maculopapular rash on erythematous base to the right side of the neck. No drainage. Some scabbing from itching. Neurological:      Mental Status: She is alert and oriented to person, place, and time. Psychiatric:         Mood and Affect: Mood normal.         Behavior: Behavior normal.         Thought Content: Thought content normal.         Judgment: Judgment normal.             This dictation was generated by voice recognition computer software. Although all attempts are made to edit the dictation for accuracy, there may be errors in the transcription that are not intended. An electronic signature was used to authenticate this note.     --GRISELDA Crowell - CNP

## 2022-09-23 DIAGNOSIS — E03.4 HYPOTHYROIDISM DUE TO ACQUIRED ATROPHY OF THYROID: ICD-10-CM

## 2022-09-23 RX ORDER — LEVOTHYROXINE AND LIOTHYRONINE 57; 13.5 UG/1; UG/1
TABLET ORAL
Qty: 15 TABLET | Refills: 6 | Status: SHIPPED | OUTPATIENT
Start: 2022-09-23

## 2022-09-30 ENCOUNTER — OFFICE VISIT (OUTPATIENT)
Dept: FAMILY MEDICINE CLINIC | Age: 77
End: 2022-09-30
Payer: MEDICARE

## 2022-09-30 VITALS
DIASTOLIC BLOOD PRESSURE: 70 MMHG | BODY MASS INDEX: 20.43 KG/M2 | OXYGEN SATURATION: 99 % | HEIGHT: 62 IN | WEIGHT: 111 LBS | SYSTOLIC BLOOD PRESSURE: 122 MMHG | HEART RATE: 70 BPM

## 2022-09-30 DIAGNOSIS — Z85.828 HISTORY OF BASAL CELL CARCINOMA (BCC): ICD-10-CM

## 2022-09-30 DIAGNOSIS — E03.4 HYPOTHYROIDISM DUE TO ACQUIRED ATROPHY OF THYROID: ICD-10-CM

## 2022-09-30 DIAGNOSIS — E78.00 HYPERCHOLESTEREMIA: ICD-10-CM

## 2022-09-30 DIAGNOSIS — J30.9 ALLERGIC RHINITIS, UNSPECIFIED SEASONALITY, UNSPECIFIED TRIGGER: ICD-10-CM

## 2022-09-30 DIAGNOSIS — M81.6 LOCALIZED OSTEOPOROSIS WITHOUT CURRENT PATHOLOGICAL FRACTURE: ICD-10-CM

## 2022-09-30 DIAGNOSIS — K63.5 POLYP OF COLON, UNSPECIFIED PART OF COLON, UNSPECIFIED TYPE: ICD-10-CM

## 2022-09-30 DIAGNOSIS — A60.09 HERPES GENITALIS IN WOMEN: ICD-10-CM

## 2022-09-30 DIAGNOSIS — I65.21 ATHEROSCLEROSIS OF RIGHT CAROTID ARTERY: ICD-10-CM

## 2022-09-30 DIAGNOSIS — R73.03 PREDIABETES: ICD-10-CM

## 2022-09-30 DIAGNOSIS — Z00.00 MEDICARE ANNUAL WELLNESS VISIT, SUBSEQUENT: Primary | ICD-10-CM

## 2022-09-30 LAB
A/G RATIO: 1.9 (ref 1.1–2.2)
ALBUMIN SERPL-MCNC: 4.7 G/DL (ref 3.4–5)
ALP BLD-CCNC: 107 U/L (ref 40–129)
ALT SERPL-CCNC: 13 U/L (ref 10–40)
ANION GAP SERPL CALCULATED.3IONS-SCNC: 14 MMOL/L (ref 3–16)
AST SERPL-CCNC: 18 U/L (ref 15–37)
BILIRUB SERPL-MCNC: 0.6 MG/DL (ref 0–1)
BUN BLDV-MCNC: 11 MG/DL (ref 7–20)
CALCIUM SERPL-MCNC: 9.7 MG/DL (ref 8.3–10.6)
CHLORIDE BLD-SCNC: 95 MMOL/L (ref 99–110)
CO2: 24 MMOL/L (ref 21–32)
CREAT SERPL-MCNC: 0.6 MG/DL (ref 0.6–1.2)
GFR AFRICAN AMERICAN: >60
GFR NON-AFRICAN AMERICAN: >60
GLUCOSE BLD-MCNC: 105 MG/DL (ref 70–99)
POTASSIUM SERPL-SCNC: 4.7 MMOL/L (ref 3.5–5.1)
SODIUM BLD-SCNC: 133 MMOL/L (ref 136–145)
T4 FREE: 1.4 NG/DL (ref 0.9–1.8)
TOTAL PROTEIN: 7.2 G/DL (ref 6.4–8.2)
TSH SERPL DL<=0.05 MIU/L-ACNC: 0.91 UIU/ML (ref 0.27–4.2)

## 2022-09-30 PROCEDURE — 1036F TOBACCO NON-USER: CPT | Performed by: FAMILY MEDICINE

## 2022-09-30 PROCEDURE — G8420 CALC BMI NORM PARAMETERS: HCPCS | Performed by: FAMILY MEDICINE

## 2022-09-30 PROCEDURE — G0439 PPPS, SUBSEQ VISIT: HCPCS | Performed by: FAMILY MEDICINE

## 2022-09-30 PROCEDURE — 99213 OFFICE O/P EST LOW 20 MIN: CPT | Performed by: FAMILY MEDICINE

## 2022-09-30 PROCEDURE — 90694 VACC AIIV4 NO PRSRV 0.5ML IM: CPT | Performed by: FAMILY MEDICINE

## 2022-09-30 PROCEDURE — 1090F PRES/ABSN URINE INCON ASSESS: CPT | Performed by: FAMILY MEDICINE

## 2022-09-30 PROCEDURE — G8399 PT W/DXA RESULTS DOCUMENT: HCPCS | Performed by: FAMILY MEDICINE

## 2022-09-30 PROCEDURE — 1123F ACP DISCUSS/DSCN MKR DOCD: CPT | Performed by: FAMILY MEDICINE

## 2022-09-30 PROCEDURE — G0008 ADMIN INFLUENZA VIRUS VAC: HCPCS | Performed by: FAMILY MEDICINE

## 2022-09-30 PROCEDURE — G8427 DOCREV CUR MEDS BY ELIG CLIN: HCPCS | Performed by: FAMILY MEDICINE

## 2022-09-30 PROCEDURE — 36415 COLL VENOUS BLD VENIPUNCTURE: CPT | Performed by: FAMILY MEDICINE

## 2022-09-30 RX ORDER — ZOLEDRONIC ACID 5 MG/100ML
5 INJECTION, SOLUTION INTRAVENOUS ONCE
Qty: 100 ML | Refills: 0 | Status: SHIPPED | OUTPATIENT
Start: 2022-09-30 | End: 2022-09-30

## 2022-09-30 RX ORDER — ACYCLOVIR 400 MG/1
400 TABLET ORAL DAILY
Qty: 90 TABLET | Refills: 3 | Status: SHIPPED | OUTPATIENT
Start: 2022-09-30

## 2022-09-30 ASSESSMENT — PATIENT HEALTH QUESTIONNAIRE - PHQ9
1. LITTLE INTEREST OR PLEASURE IN DOING THINGS: 0
2. FEELING DOWN, DEPRESSED OR HOPELESS: 0
SUM OF ALL RESPONSES TO PHQ QUESTIONS 1-9: 0
SUM OF ALL RESPONSES TO PHQ9 QUESTIONS 1 & 2: 0
SUM OF ALL RESPONSES TO PHQ QUESTIONS 1-9: 0

## 2022-09-30 ASSESSMENT — LIFESTYLE VARIABLES
HOW OFTEN DO YOU HAVE A DRINK CONTAINING ALCOHOL: 4 OR MORE TIMES A WEEK
HOW OFTEN DURING THE LAST YEAR HAVE YOU FAILED TO DO WHAT WAS NORMALLY EXPECTED FROM YOU BECAUSE OF DRINKING: 0
HAVE YOU OR SOMEONE ELSE BEEN INJURED AS A RESULT OF YOUR DRINKING: 0
HAS A RELATIVE, FRIEND, DOCTOR, OR ANOTHER HEALTH PROFESSIONAL EXPRESSED CONCERN ABOUT YOUR DRINKING OR SUGGESTED YOU CUT DOWN: 0
HOW OFTEN DURING THE LAST YEAR HAVE YOU FOUND THAT YOU WERE NOT ABLE TO STOP DRINKING ONCE YOU HAD STARTED: 0
HOW OFTEN DURING THE LAST YEAR HAVE YOU HAD A FEELING OF GUILT OR REMORSE AFTER DRINKING: 0
HOW MANY STANDARD DRINKS CONTAINING ALCOHOL DO YOU HAVE ON A TYPICAL DAY: 1 OR 2
HOW OFTEN DURING THE LAST YEAR HAVE YOU BEEN UNABLE TO REMEMBER WHAT HAPPENED THE NIGHT BEFORE BECAUSE YOU HAD BEEN DRINKING: 0
HOW OFTEN DURING THE LAST YEAR HAVE YOU NEEDED AN ALCOHOLIC DRINK FIRST THING IN THE MORNING TO GET YOURSELF GOING AFTER A NIGHT OF HEAVY DRINKING: 0

## 2022-09-30 NOTE — PROGRESS NOTES
Immunizations Administered       Name Date Dose Route    Influenza, FLUAD, (age 72 y+), Adjuvanted, 0.5mL 9/30/2022 0.5 mL Intramuscular    Site: Deltoid- Left    Lot: [id number]    NDC: 78278-210-71

## 2022-09-30 NOTE — PROGRESS NOTES
[place]  Memory good  Vision/ hearing fairly good - eye exam on Tuesday - watching cataracts - hearing tested recently  Has pet bird - parrot  Prolia /oral bisphosphonate not tolerated. BP Readings from Last 3 Encounters:   09/30/22 122/70   08/24/22 106/64   08/01/22 (!) 128/58     Pulse Readings from Last 3 Encounters:   09/30/22 70   08/24/22 75   08/01/22 66     Wt Readings from Last 3 Encounters:   09/30/22 111 lb (50.3 kg)   08/24/22 113 lb (51.3 kg)   07/28/22 115 lb (52.2 kg)       Patient's complete Health Risk Assessment and screening values have been reviewed and are found in Flowsheets. The following problems were reviewed today and where indicated follow up appointments were made and/or referrals ordered.     Positive Risk Factor Screenings with Interventions:             General Health and ACP:  General  In general, how would you say your health is?: Very Good  In the past 7 days, have you experienced any of the following: New or Increased Pain, New or Increased Fatigue, Loneliness, Social Isolation, Stress or Anger?: No  Do you get the social and emotional support that you need?: (!) No  Do you have a Living Will?: Yes    Advance Directives       Power of  Living Will ACP-Advance Directive ACP-Power of     Not on File Not on File Not on File Not on File        General Health Risk Interventions:  Social connections dw/ pt - record of lw/ dpoa on chart encouraged      Safety:  Do you have working smoke detectors?: Yes  Do you have any tripping hazards - loose or unsecured carpets or rugs?: No  Do you have any tripping hazards - clutter in doorways, halls, or stairs?: No  Do you have either shower bars, grab bars, non-slip mats or non-slip surfaces in your shower or bathtub?: Yes  Do all of your stairways have a railing or banister?: (!) No  Do you always fasten your seatbelt when you are in a car?: Yes  Safety Interventions:  Home safety tips provided Objective   Vitals:    09/30/22 0921   BP: 122/70   Site: Left Upper Arm   Position: Sitting   Cuff Size: Medium Adult   Pulse: 70   SpO2: 99%   Weight: 111 lb (50.3 kg)   Height: 5' 2\" (1.575 m)      Body mass index is 20.3 kg/m². Allergies   Allergen Reactions    Alendronate      Stomach upset    Doxycycline Other (See Comments)     Abdominal pain    Penicillins Hives    Sulfa Antibiotics     Zithromax [Azithromycin]      Prior to Visit Medications    Medication Sig Taking? Authorizing Provider   thyroid (LARA THYROID) 90 MG tablet TAKE ONE TABLET BY MOUTH EVERY OTHER DAY Yes Luke A Glischinski, APRN - CNP   hydrocortisone 2.5 % cream Apply topically 2 times daily. Yes GRISELDA Mora CNP   cetirizine (ZYRTEC) 10 MG tablet Take 1 tablet by mouth daily Yes GRISELDA Mora CNP   acyclovir (ZOVIRAX) 400 MG tablet Take 1 tablet by mouth daily Yes GRISELDA Mora CNP   permethrin (ELIMITE) 5 % cream Apply to skin at night. Keep on skin for 8-14 hours. Rinse off in shower. Repeat in 1 week. Yes GRISELDA Mora CNP   NONFORMULARY OSTEO BASE Yes Historical Provider, MD   MISC NATURAL PRODUCTS PO Take by mouth ostebase (Ca+Vit D +Vit. K+ mg+) Yes Historical Provider, MD   MISC NATURAL PRODUCTS PO Take 1 capsule by mouth 3 times daily ALPHA BASE CAPS WITHOUT IRON Yes Historical Provider, MD   MISC NATURAL PRODUCTS PO Take 1 tablet by mouth daily 4SIGHT Yes Historical Provider, MD   MISC NATURAL PRODUCTS PO Take 2 tablets by mouth daily COSMEDIX Yes Historical Provider, MD   MISC NATURAL PRODUCTS PO Take 1 tablet by mouth daily ORTHO DIGESTZYME Yes Historical Provider, MD   MISC NATURAL PRODUCTS PO Take 1 tablet by mouth daily garlic Yes Historical Provider, MD   Omega-3 Fatty Acids (FISH OIL) 1200 MG CAPS Take by mouth Yes Historical Provider, MD   Misc Natural Products CAPS Take 1 capsule by mouth daily.  ADRENAL CAPS Yes Historical Provider, MD   Vitamin E 100 UNITS TABS Take 1 tablet by mouth daily  Yes Historical Provider, MD Vasquez (Including outside providers/suppliers regularly involved in providing care):   Patient Care Team:  GRISELDA Galicia CNP as PCP - General (Nurse Practitioner)  GRISELDA Galicia CNP as PCP - [facility]an[facility]  Shakira Kaur MD as Consulting Physician (Otolaryngology)  Gregorio Garcia as Consulting Physician (Dermatology)  Luna Hernandez MD as Consulting Physician (Gynecology)  Laura Razo MD as Consulting Physician (Otolaryngology)     Reviewed and updated this visit:  Tobacco  Allergies  Meds  Med Hx  Surg Hx  Soc Hx  Fam Hx

## 2022-09-30 NOTE — PATIENT INSTRUCTIONS
Personalized Preventive Plan for Rachel Sample - 9/30/2022  Medicare offers a range of preventive health benefits. Some of the tests and screenings are paid in full while other may be subject to a deductible, co-insurance, and/or copay. Some of these benefits include a comprehensive review of your medical history including lifestyle, illnesses that may run in your family, and various assessments and screenings as appropriate. After reviewing your medical record and screening and assessments performed today your provider may have ordered immunizations, labs, imaging, and/or referrals for you. A list of these orders (if applicable) as well as your Preventive Care list are included within your After Visit Summary for your review. Other Preventive Recommendations:    A preventive eye exam performed by an eye specialist is recommended every 1-2 years to screen for glaucoma; cataracts, macular degeneration, and other eye disorders. A preventive dental visit is recommended every 6 months. Try to get at least 150 minutes of exercise per week or 10,000 steps per day on a pedometer . Order or download the FREE \"Exercise & Physical Activity: Your Everyday Guide\" from [employer]. Call 7-352.914.9305 or search [employer] online. You need 7035-2185 mg of calcium and 5840-5648 IU of vitamin D per day. It is possible to meet your calcium requirement with diet alone, but a vitamin D supplement is usually necessary to meet this goal.  When exposed to the sun, use a sunscreen that protects against both UVA and UVB radiation with an SPF of 30 or greater. Reapply every 2 to 3 hours or after sweating, drying off with a towel, or swimming. Always wear a seat belt when traveling in a car. Always wear a helmet when riding a bicycle or motorcycle.

## 2022-10-01 LAB
ESTIMATED AVERAGE GLUCOSE: 119.8 MG/DL
HBA1C MFR BLD: 5.8 %

## 2022-10-19 ENCOUNTER — TELEPHONE (OUTPATIENT)
Dept: FAMILY MEDICINE CLINIC | Age: 77
End: 2022-10-19

## 2022-10-19 DIAGNOSIS — M81.0 AGE-RELATED OSTEOPOROSIS WITHOUT CURRENT PATHOLOGICAL FRACTURE: Primary | ICD-10-CM

## 2022-10-19 NOTE — TELEPHONE ENCOUNTER
----- Message from Johnna Gibson sent at 10/19/2022  1:02 PM EDT -----  Subject: Referral Request    Reason for referral request? Dexa Scan  Provider patient wants to be referred to(if known):     Provider Phone Number(if known): Additional Information for Provider? Pt says she is due to have it done   this year. needs referral created.   ---------------------------------------------------------------------------  --------------  Lara HARRISON    [phone]; OK to leave message on voicemail  ---------------------------------------------------------------------------  --------------

## 2022-12-29 ENCOUNTER — HOSPITAL ENCOUNTER (OUTPATIENT)
Dept: WOMENS IMAGING | Age: 77
Discharge: HOME | End: 2022-12-29
Payer: MEDICARE

## 2022-12-29 VITALS — WEIGHT: 119 LBS | HEIGHT: 62 IN | BODY MASS INDEX: 21.9 KG/M2

## 2022-12-29 DIAGNOSIS — Z12.31 VISIT FOR SCREENING MAMMOGRAM: ICD-10-CM

## 2022-12-29 PROCEDURE — 77067 SCR MAMMO BI INCL CAD: CPT

## 2023-01-04 ENCOUNTER — HOSPITAL ENCOUNTER (OUTPATIENT)
Dept: GENERAL RADIOLOGY | Age: 78
Discharge: HOME | End: 2023-01-04
Payer: MEDICARE

## 2023-01-04 DIAGNOSIS — M81.0 AGE-RELATED OSTEOPOROSIS WITHOUT CURRENT PATHOLOGICAL FRACTURE: ICD-10-CM

## 2023-01-04 PROCEDURE — 77080 DXA BONE DENSITY AXIAL: CPT

## 2023-01-10 ENCOUNTER — TELEPHONE (OUTPATIENT)
Dept: FAMILY MEDICINE CLINIC | Age: 78
End: 2023-01-10

## 2023-01-10 DIAGNOSIS — M81.0 AGE-RELATED OSTEOPOROSIS WITHOUT CURRENT PATHOLOGICAL FRACTURE: Primary | ICD-10-CM

## 2023-01-10 NOTE — TELEPHONE ENCOUNTER
Occupational Therapy   Occupational Therapy Initial Assessment/treatment/Discharge  1x only   Date: 3/6/2021   Patient Name: Guy Curran  MRN: [id number]     : 1959    Date of Service: 3/6/2021    Discharge Recommendations:  Home with assist PRN       Assessment   Performance deficits / Impairments: Decreased balance  Assessment: Pt 56 yo male functioning close to baseline after dizziness and increased falls at home. Pt reports IND PLOF and lives at home with spouse. Pt is currently at S-Ind level for all adls and bathroom mobility. Pt reports no concerns with returning home and educated on increased safety awareness. No further OT needs, Please reorder if there is a change in status. Prognosis: Good  Decision Making: Low Complexity  OT Education: OT Role;Energy Conservation  Patient Education: safety awareness for adls when dizzy  No Skilled OT: Safe to return home; At baseline function  REQUIRES OT FOLLOW UP: No  Activity Tolerance  Activity Tolerance: Patient Tolerated treatment well  Activity Tolerance: reports some dizziness but vitals stable, /70 HR 98  Safety Devices  Safety Devices in place: Yes  Type of devices: Call light within reach; Chair alarm in place;Gait belt;Nurse notified; Left in chair           Patient Diagnosis(es): There were no encounter diagnoses. has a past medical history of TORI (acute kidney injury) ([facility] [address] Bacteremia, Diabetes mellitus ([facility] [address] Diabetic neuropathy ([facility] [address] Gout, MRSA (methicillin resistant staph aureus) culture positive, MRSA (methicillin resistant staph aureus) culture positive, Pneumonia, and Pyogenic inflammation of bone ([facility] [address] has a past surgical history that includes eye surgery; other surgical history (2018); Toe amputation (Right, 2018); and Upper gastrointestinal endoscopy (N/A, 2021).            Restrictions  Restrictions/Precautions  Restrictions/Precautions: Fall Risk, Isolation, Contact Precautions  Required Braces or Pt is wanting to know if there is anything other than taking medication that can help with the results of her Dexa Scan. Is there any therapy? Pt dentist does not want her to take medication because he will not work on her teeth. Orthoses?: No  Position Activity Restriction  Other position/activity restrictions: Contact (MRSA) telemetry, up with assist    Subjective   General  Chart Reviewed: Yes  Patient assessed for rehabilitation services?: Yes  Family / Caregiver Present: No  Referring Practitioner: Jaskaran Cartwright MD  Diagnosis: dizziness, falls  Subjective  Subjective: Pt agreeable to therapy  General Comment  Comments: RN approved therapy  Patient Currently in Pain: Denies  Vital Signs  Patient Currently in Pain: Denies     Social/Functional History  Social/Functional History  Lives With: Spouse  Type of Home: House  Home Layout: Two level, Bed/Bath upstairs, Laundry in basement  Home Access: Stairs to enter with rails  Entrance Stairs - Number of Steps: 3 steps into house, 12-13 steps with UHR to second floor and basement  Entrance Stairs - Rails: Both  Bathroom Shower/Tub: Tub/Shower unit  Bathroom Toilet: Standard  Home Equipment: Standard walker, Constancia Plater  ADL Assistance: Independent  Homemaking Responsibilities: Yes(shares with wife)  Meal Prep Responsibility: Primary  Laundry Responsibility: Primary  Cleaning Responsibility: Primary  Ambulation Assistance: Independent(Without AD)  Transfer Assistance: Independent  Active : Yes  Additional Comments: Pt reports  recent hx of frequent falls, 5 falls day prior to admission, 1 fall in last month additional 2-3 in past year; Wife works full time M-F       Objective        Orientation  Overall Orientation Status: Within Functional Limits  Observation/Palpation  Posture: Fair  Balance  Sitting Balance: Independent  Standing Balance: Independent  Standing Balance  Time: ~10 minutes x2  Activity: standing level adls, ambulation in hallway  Functional Mobility  Functional - Mobility Device: No device  Activity: Other; To/from bathroom  Assist Level: Supervision  Functional Mobility Comments: reports some dizziness but no LOB  Toilet Transfers  Toilet - Technique: Ambulating  Equipment Used: Grab bars  Toilet Transfer: Modified independent  ADL  UE Bathing: Independent(in stance at sink)  LE Dressing: Supervision(to don pants)  Toileting: Independent  Tone RUE  RUE Tone: Normotonic  Tone LUE  LUE Tone: Normotonic  Coordination  Movements Are Fluid And Coordinated: Yes     Bed mobility  Supine to Sit: Unable to assess  Sit to Supine: Unable to assess  Transfers  Sit to stand: Independent  Stand to sit: Independent     Cognition  Overall Cognitive Status: WFL        Sensation  Overall Sensation Status: Impaired(Neuorpathy to B feet)        LUE AROM (degrees)  LUE AROM : WFL  RUE AROM (degrees)  RUE AROM : WFL  LUE Strength  Gross LUE Strength: WFL  RUE Strength  Gross RUE Strength: WFL                   Plan   Plan  Times per week: 1x only      AM-PAC Score        AM-[facility] Inpatient Daily Activity Raw Score: 24 (03/06/21 0958)  AM-PAC Inpatient ADL T-Scale Score : 57.54 (03/06/21 0958)  ADL Inpatient CMS 0-100% Score: 0 (03/06/21 0958)  ADL Inpatient CMS G-Code Modifier : [facility] (03/06/21 8359)    Goals  Short term goals  Time Frame for Short term goals: 1 x only  Short term goal 1: Pt will complete toilet transfer with S. goal met  Short term goal 2: Pt will complete LB dressing with S.  GOAL met  Patient Goals   Patient goals : \"to ho home\"       Therapy Time   Individual Concurrent Group Co-treatment   Time In 0739         Time Out 0817         Minutes 38         Timed Code Treatment Minutes: 28 Minutes(10 minutes for evaluation)       IMTIAZ Kenney

## 2023-01-10 NOTE — TELEPHONE ENCOUNTER
The risk of osteonecrosis of the jaw with this medicine is reportedly less than 1%. However, it can reduce your fracture risk including hip fracture by 50%. For most people the benefit outweighs the risk. You may be eligible for the next level of treatment which is generally daily injections of a medicine to reduce fracture risk by building up your bone density. These can be a little difficult at times to get covered by insurance as they tend to be expensive. It may be best to talk to a osteoporosis specialist - Dr. Melodie Wilson - who is considered a national expert. His office is in [place].   [phone] is the number to schedule with the osteoporosis specialist.

## 2023-01-12 NOTE — TELEPHONE ENCOUNTER
Pt wants to know if she can go to Dr Trevin Mathews instead of Dr Veronica Gibson. Dr Trevin Mathews is a Rheumatologist and he is closer to her. 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 Her Gyno suggested Dr Trevin Mathews. Please call .

## 2023-01-12 NOTE — TELEPHONE ENCOUNTER
Rheumatology would not manage osteoporosis. This is typically managed by either primary care or endocrinology. Dr. Anjel Sloan is really good. I would highly recommend seeing them also.

## 2023-01-26 ENCOUNTER — OFFICE VISIT (OUTPATIENT)
Dept: ENT CLINIC | Age: 78
End: 2023-01-26
Payer: MEDICARE

## 2023-01-26 VITALS
HEIGHT: 61 IN | SYSTOLIC BLOOD PRESSURE: 132 MMHG | BODY MASS INDEX: 21.71 KG/M2 | DIASTOLIC BLOOD PRESSURE: 73 MMHG | TEMPERATURE: 98 F | HEART RATE: 72 BPM | WEIGHT: 115 LBS

## 2023-01-26 DIAGNOSIS — H61.23 IMPACTED CERUMEN OF BOTH EARS: Primary | ICD-10-CM

## 2023-01-26 DIAGNOSIS — H90.0 CONDUCTIVE HEARING LOSS OF BOTH EARS: ICD-10-CM

## 2023-01-26 PROCEDURE — 69210 REMOVE IMPACTED EAR WAX UNI: CPT | Performed by: OTOLARYNGOLOGY

## 2023-01-26 NOTE — PATIENT INSTRUCTIONS
Schedule an audiogram (hearing test), if your hearing is not back to your usual ability, or if hearing loss or tinnitus (ringing or other noise) persists, despite removal of ear wax,. Schedule an appointment for ear recheck and possible cleaning in the future if your hearing is decreased, or you have a sensation of ear wax build up or are told you have ear wax build up by your primary physician or other health care provider. You may use an over the counter ear wax removal kit (such as Murine, Bausch and Lomb, NeilMed, or Debrox wax removal system) for ear wax removal, as needed. It may help to use Debrox (OTC) for 4 days prior to future visits for ear cleaning. This may soften your ear wax and facilitate removal of the wax. NO Q-TIPS OR OTHER INSTRUMENTS/OBJECTS IN THE EARS   You should never clean your ears with a Q-tip, cotton tipped applicator, Jesenia pin, paper clip, safety pin, pen cap, or any other instrument. This will tend to push wax in deeper and pack the ear canal with wax. There is a high risk and danger of this practice, especially rupture of ear drum, dislocation or other damage to ossicles, and permanent, irreversible, and irreparable hearing loss. It may cause inflammation and irritation of the ear canal and cause itching or pain. I recommend only use of one the several ear wax removal kits available \"over the counter\" if you feel a need to try to remove ear wax. For example, Murine, Bausch and Lomb, NeilMed, or Debrox ear wax removal kits may be used for ear wax removal, as needed. No other methods should be self used for cleaning your ears.

## 2023-01-26 NOTE — PROGRESS NOTES
Chief Complaint   Patient presents with    Cerumen Impaction    Hearing Loss       HISTORY:    Skye Sheikh stated that the hearing may be decreased in both ears, which are plugged up with ear wax. EXAMINATION:    Both external ear canals were occluded by cerumen impaction, 100% on the left and 75% on the right, obscuring visualization of the TMs. PROCEDURE - REMOVAL OF BILATERAL CERUMEN IMPACTION:   The cerumen impaction was removed from both of the EACs, under otomicroscopy visualization, with instrumentation, using a Billeau wire loop. After successful cerumen removal, the EACs appeared to be normal and clear bilaterally without mass, exudate, or edema. The tympanic membranes appeared to be normal, including normal pneumatic mobility bilaterally. There was no evidence of acute disease. Skye Sheikh reported improved hearing, back to usual level, after cerumen removal.         HEARING ASSESSMENT:  Finger rub:  Able to hear finger rub bilaterally. Tuning fork tests, [address] tuning fork:  Smith heard \"all over\" and Rinne air > bone bilaterally. IMPRESSION / Dominique Diss / Yusuf Dykes / PROCEDURES:       Skye Sheikh was seen today for cerumen impaction and hearing loss. Diagnoses and all orders for this visit:    Impacted cerumen of both ears    Conductive hearing loss of both ears        RECOMMENDATIONS / PLAN:   See Patient Instructions on file for this visit. Return for recurrence of symptoms of excessive ear wax, or any further ear nose throat or sinus problems.

## 2023-01-27 ENCOUNTER — TELEPHONE (OUTPATIENT)
Dept: ENDOCRINOLOGY | Age: 78
End: 2023-01-27

## 2023-02-07 ENCOUNTER — OFFICE VISIT (OUTPATIENT)
Dept: FAMILY MEDICINE CLINIC | Age: 78
End: 2023-02-07
Payer: MEDICARE

## 2023-02-07 VITALS
DIASTOLIC BLOOD PRESSURE: 84 MMHG | SYSTOLIC BLOOD PRESSURE: 124 MMHG | HEART RATE: 90 BPM | OXYGEN SATURATION: 96 % | RESPIRATION RATE: 16 BRPM | BODY MASS INDEX: 21.71 KG/M2 | HEIGHT: 61 IN | WEIGHT: 115 LBS

## 2023-02-07 DIAGNOSIS — M81.6 LOCALIZED OSTEOPOROSIS WITHOUT CURRENT PATHOLOGICAL FRACTURE: ICD-10-CM

## 2023-02-07 DIAGNOSIS — J06.9 VIRAL URI: Primary | ICD-10-CM

## 2023-02-07 PROCEDURE — G8484 FLU IMMUNIZE NO ADMIN: HCPCS | Performed by: NURSE PRACTITIONER

## 2023-02-07 PROCEDURE — G8420 CALC BMI NORM PARAMETERS: HCPCS | Performed by: NURSE PRACTITIONER

## 2023-02-07 PROCEDURE — 1123F ACP DISCUSS/DSCN MKR DOCD: CPT | Performed by: NURSE PRACTITIONER

## 2023-02-07 PROCEDURE — G8399 PT W/DXA RESULTS DOCUMENT: HCPCS | Performed by: NURSE PRACTITIONER

## 2023-02-07 PROCEDURE — 1036F TOBACCO NON-USER: CPT | Performed by: NURSE PRACTITIONER

## 2023-02-07 PROCEDURE — 99213 OFFICE O/P EST LOW 20 MIN: CPT | Performed by: NURSE PRACTITIONER

## 2023-02-07 PROCEDURE — G8427 DOCREV CUR MEDS BY ELIG CLIN: HCPCS | Performed by: NURSE PRACTITIONER

## 2023-02-07 PROCEDURE — 1090F PRES/ABSN URINE INCON ASSESS: CPT | Performed by: NURSE PRACTITIONER

## 2023-02-07 SDOH — ECONOMIC STABILITY: HOUSING INSECURITY
IN THE LAST 12 MONTHS, WAS THERE A TIME WHEN YOU DID NOT HAVE A STEADY PLACE TO SLEEP OR SLEPT IN A SHELTER (INCLUDING NOW)?: NO

## 2023-02-07 SDOH — ECONOMIC STABILITY: FOOD INSECURITY: WITHIN THE PAST 12 MONTHS, THE FOOD YOU BOUGHT JUST DIDN'T LAST AND YOU DIDN'T HAVE MONEY TO GET MORE.: NEVER TRUE

## 2023-02-07 SDOH — ECONOMIC STABILITY: INCOME INSECURITY: HOW HARD IS IT FOR YOU TO PAY FOR THE VERY BASICS LIKE FOOD, HOUSING, MEDICAL CARE, AND HEATING?: NOT HARD AT ALL

## 2023-02-07 SDOH — ECONOMIC STABILITY: FOOD INSECURITY: WITHIN THE PAST 12 MONTHS, YOU WORRIED THAT YOUR FOOD WOULD RUN OUT BEFORE YOU GOT MONEY TO BUY MORE.: NEVER TRUE

## 2023-02-07 ASSESSMENT — ENCOUNTER SYMPTOMS
NAUSEA: 0
ABDOMINAL DISTENTION: 0
BACK PAIN: 0
DIARRHEA: 0
COLOR CHANGE: 0
SHORTNESS OF BREATH: 0
EYE DISCHARGE: 0
CHEST TIGHTNESS: 0
CONSTIPATION: 0
ABDOMINAL PAIN: 0
SINUS PRESSURE: 0
COUGH: 0
SINUS PAIN: 0

## 2023-02-07 ASSESSMENT — PATIENT HEALTH QUESTIONNAIRE - PHQ9
SUM OF ALL RESPONSES TO PHQ QUESTIONS 1-9: 0
1. LITTLE INTEREST OR PLEASURE IN DOING THINGS: 0
SUM OF ALL RESPONSES TO PHQ QUESTIONS 1-9: 0
SUM OF ALL RESPONSES TO PHQ9 QUESTIONS 1 & 2: 0
SUM OF ALL RESPONSES TO PHQ QUESTIONS 1-9: 0
SUM OF ALL RESPONSES TO PHQ QUESTIONS 1-9: 0
2. FEELING DOWN, DEPRESSED OR HOPELESS: 0

## 2023-02-07 NOTE — PROGRESS NOTES
Date of Service:  2023    Justin Estrada (:  1945) is a 65 y.o. female, here for evaluation of the following medical concerns:    Chief Complaint   Patient presents with    Pharyngitis     Sore throat x2 days, lots of drainage irritating it        HPI    Upper Respiratory Infection  Patient complains of symptoms of a URI. Symptoms include congestion, sore throat, and post nasal drainage . Onset of symptoms was 2 days ago, gradually improving since that time. She also c/o no  fever for the past 2 days . She is drinking plenty of fluids. Evaluation to date: none. Treatment to date: gargled salt water, tylenol. Patient concerned about osteoporosis DEXA scan. Says she cannot afford or tolerate treatment. Says she [place] at the park. Supposed to see Dr Piper Gonzalez per Dr Anju Ballard but says she is trying to get her bills taken care of first. Prolia too expensive. Reviewed notes from DEXA scan discussions: Your DEXA scan was a little worse in the forearm relative to previous. Your other areas were unchanged. I know you have had trouble with fosamax in the past. I would recommend starting a medication called prolia. This helps improve bone density and has less side effects. This is an injection that is given every 6 months. We can have you go through the infusion center at the hospital. If you are agreeable, we will get this set up for you. Otherwise, make sure that you are taking your calcium/vitamin D supplement as you have been taking it. Review of Systems   Constitutional:  Negative for activity change, appetite change, fatigue, fever and unexpected weight change. HENT:  Positive for congestion and postnasal drip. Negative for ear pain, sinus pressure and sinus pain. Eyes:  Negative for discharge and visual disturbance. Respiratory:  Negative for cough, chest tightness and shortness of breath. Cardiovascular:  Negative for chest pain, palpitations and leg swelling. Gastrointestinal:  Negative for abdominal distention, abdominal pain, constipation, diarrhea and nausea. Endocrine: Negative for cold intolerance, heat intolerance, polydipsia, polyphagia and polyuria. Genitourinary:  Negative for decreased urine volume, difficulty urinating, dysuria, flank pain, frequency and urgency. Musculoskeletal:  Negative for arthralgias, back pain, gait problem, joint swelling, myalgias and neck pain. Skin:  Negative for color change, rash and wound. Allergic/Immunologic: Negative for food allergies and immunocompromised state. Neurological:  Negative for dizziness, tremors, speech difficulty, weakness, light-headedness, numbness and headaches. Hematological:  Negative for adenopathy. Does not bruise/bleed easily. Psychiatric/Behavioral:  Negative for confusion, decreased concentration, self-injury, sleep disturbance and suicidal ideas. The patient is not nervous/anxious. Prior to Visit Medications    Medication Sig Taking? Authorizing Provider   acyclovir (ZOVIRAX) 400 MG tablet Take 1 tablet by mouth daily Yes Maria L Samuel MD   thyroid (ARMOUR THYROID) 90 MG tablet TAKE ONE TABLET BY MOUTH EVERY OTHER DAY Yes Luke A Glischinski, APRN - CNP   cetirizine (ZYRTEC) 10 MG tablet Take 1 tablet by mouth daily Yes Morley Dakin, APRN - CNP   NONFORMULARY OSTEO BASE Yes Historical Provider, MD   MISC NATURAL PRODUCTS PO Take by mouth ostebase (Ca+Vit D +Vit.  K+ mg+) Yes Historical Provider, MD   MISC NATURAL PRODUCTS PO Take 1 capsule by mouth 3 times daily ALPHA BASE CAPS WITHOUT IRON Yes Historical Provider, MD   MISC NATURAL PRODUCTS PO Take 1 tablet by mouth daily 4SIGHT Yes Historical Provider, MD   MISC NATURAL PRODUCTS PO Take 2 tablets by mouth daily COSMEDIX Yes Historical Provider, MD   MISC NATURAL PRODUCTS PO Take 1 tablet by mouth daily ORTHO DIGESTZYME Yes Historical Provider, MD   MISC NATURAL PRODUCTS PO Take 1 tablet by mouth daily garlic Yes Historical Provider, MD   Omega-3 Fatty Acids (FISH OIL) 1200 MG CAPS Take by mouth Yes Historical Provider, MD   Misc Natural Products CAPS Take 1 capsule by mouth daily. ADRENAL CAPS Yes Historical Provider, MD   Vitamin E 100 UNITS TABS Take 1 tablet by mouth daily  Yes Historical Provider, MD   zoledronic acid (RECLAST) 5 MG/100ML SOLN Infuse 100 mLs intravenously once for 1 dose  Ramona Dixon MD   hydrocortisone 2.5 % cream Apply topically 2 times daily. Patient not taking: Reported on 2/7/2023  GRISELDA Warren CNP   permethrin (ELIMITE) 5 % cream Apply to skin at night. Keep on skin for 8-14 hours. Rinse off in shower. Repeat in 1 week. Patient not taking: Reported on 2/7/2023  GRISELDA Warren CNP        Social History     Tobacco Use    Smoking status: Never    Smokeless tobacco: Never   Substance Use Topics    Alcohol use: Yes     Alcohol/week: 1.0 standard drink     Types: 1 Glasses of wine per week     Comment: daily        Vitals:    02/07/23 1435   BP: 124/84   Site: Left Upper Arm   Position: Sitting   Cuff Size: Medium Adult   Pulse: 90   Resp: 16   SpO2: 96%   Weight: 115 lb (52.2 kg)   Height: 5' 1\" (1.549 m)     Estimated body mass index is 21.73 kg/m² as calculated from the following:    Height as of this encounter: 5' 1\" (1.549 m). Weight as of this encounter: 115 lb (52.2 kg). Physical Exam  Vitals reviewed. Constitutional:       General: She is awake. Appearance: Normal appearance. She is well-developed, well-groomed and normal weight. She is not ill-appearing. HENT:      Head: Normocephalic and atraumatic. Right Ear: Hearing, tympanic membrane, ear canal and external ear normal.      Left Ear: Hearing, tympanic membrane, ear canal and external ear normal.      Nose: Nose normal.      Mouth/Throat:      Lips: Pink. Mouth: Mucous membranes are moist.      Pharynx: Oropharynx is clear.    Eyes:      General: Lids are normal.      Extraocular Movements: Extraocular movements intact. Conjunctiva/sclera: Conjunctivae normal.      Pupils: Pupils are equal, round, and reactive to light. Neck:      Thyroid: No thyromegaly. Vascular: No carotid bruit. Cardiovascular:      Rate and Rhythm: Normal rate. Pulses:           Carotid pulses are 2+ on the right side and 2+ on the left side. Radial pulses are 2+ on the right side and 2+ on the left side. Posterior tibial pulses are 2+ on the right side and 2+ on the left side. Heart sounds: Normal heart sounds, S1 normal and S2 normal. No murmur heard. Pulmonary:      Effort: Pulmonary effort is normal.      Breath sounds: Normal breath sounds. Abdominal:      General: Bowel sounds are normal. There is no abdominal bruit. Palpations: Abdomen is soft. Tenderness: There is no abdominal tenderness. Genitourinary:     Comments: Deferred  Musculoskeletal:         General: Normal range of motion. Cervical back: Full passive range of motion without pain, normal range of motion and neck supple. Right lower leg: No edema. Left lower leg: No edema. Lymphadenopathy:      Head:      Right side of head: No submental, submandibular, tonsillar, preauricular, posterior auricular or occipital adenopathy. Left side of head: No submental, submandibular, tonsillar, preauricular, posterior auricular or occipital adenopathy. Cervical: No cervical adenopathy. Right cervical: No superficial, deep or posterior cervical adenopathy. Left cervical: No superficial, deep or posterior cervical adenopathy. Upper Body:      Right upper body: No supraclavicular adenopathy. Left upper body: No supraclavicular adenopathy. Skin:     General: Skin is warm and dry. Capillary Refill: Capillary refill takes less than 2 seconds. Neurological:      General: No focal deficit present. Mental Status: She is alert and oriented to person, place, and time.  Mental status is at baseline. Sensory: Sensation is intact. Motor: Motor function is intact. Coordination: Coordination is intact. Gait: Gait is intact. Psychiatric:         Attention and Perception: Attention and perception normal.         Mood and Affect: Mood and affect normal.         Speech: Speech normal.         Behavior: Behavior normal. Behavior is cooperative. Thought Content: Thought content normal.         Cognition and Memory: Cognition and memory normal.         Judgment: Judgment normal.       ASSESSMENT/PLAN:  1. Viral URI   Claritin daily and flonase and saline nasal spray   Call if still congested in 5-8 days and an abx can be sent at that time   Pt generally feeling better at this time. Could also be allergy related  Mucinex twice daily   Hot showers   Vicks vapo rub to chest, under nares   Humidifier or vaporizer near bed where sleeping   Sinus rinse as needed/Netti pot   Push fluids, increase protein intake   Cough and deep breathe   OTC (over the counter) Cough suppressant at night to aid with sleep- robitussin, delsym   If breathing issues, cough and deep breathe, lay on belly (prone) to aid with oxygenating lungs if possibly COVID   Vitamin C, zinc, and vitamin D supplements to boost immune system   Cepacol/throat lozenges for sore throat and cough   Warm tea/tea with honey for cough and sore throat   Warm water or salt water gargle for sore throat   If given antibiotic, take in its entirety until completion to decrease antibiotic resistance developing   Tylenol and ibuprofen for aches and pains and fever >100 F  2.  Localized osteoporosis without current pathological fracture    Discussed DEXA scan results   Cannot afford prolia   Does not tolerate fosamax   Continue calcium and vitamin D   Follow up with Dr Suyapa Johnson for bones if able to do so    Care Gaps Addressed  Call insurance company to discuss coverage for shingles vaccine (Shingrix) 2 dose series       I have reviewed patient's pertinent medical history, relevant laboratory and imaging studies, and past/future health maintenance. Discussed with the patient the importance of adhering to their current medication regimen as directed. Advised the patient that they should continue to work on eating a healthy balanced diet and staying active by exercising within their personal limits. Orders as listed above. Patient was advised to keep future appointments with their respective specialty care team(s). Patient had the opportunity to ask questions, all of which were answered to the best of my ability and with patient satisfaction. Patient understands and is agreeable with the care plan following today's visit. Patient is to schedule an appointment for any new or worsening symptoms. Go to ER for significant shortness of breath, chest pain, or uncontrolled pain or fever. I discussed with patient the risk and benefits of any medications that were prescribed today. I verified that the patient understands their medications, labs, and/or procedures. The patient is doing well with current medication regimen and does not have any barriers to adherence. The patient's self-management abilities are good. Return in about 7 weeks (around 3/29/2023) for thyroid follow up. An electronic signature was used to authenticate this note.     --GRISELDA Caruso - CNP on 2/7/2023 at 2:58 PM

## 2023-02-07 NOTE — PATIENT INSTRUCTIONS
Calcium and Vitamin D- continue these for bones    Your DEXA scan was a little worse in the forearm relative to previous. Your other areas were unchanged. I know you have had trouble with fosamax in the past. I would recommend starting a medication called prolia. This helps improve bone density and has less side effects. This is an injection that is given every 6 months. We can have you go through the infusion center at the hospital. If you are agreeable, we will get this set up for you. Otherwise, make sure that you are taking your calcium/vitamin D supplement as you have been taking it.  (Prolia too expensive)    Claritin- daily      Mucinex twice daily   Hot showers   Vicks vapo rub to chest, under nares   Humidifier or vaporizer near bed where sleeping   Sinus rinse as needed/Netti pot   Push fluids, increase protein intake   Cough and deep breathe   OTC (over the counter) Cough suppressant at night to aid with sleep- robitussin, delsym   If breathing issues, cough and deep breathe, lay on belly (prone) to aid with oxygenating lungs if possibly COVID   Vitamin C, zinc, and vitamin D supplements to boost immune system   Cepacol/throat lozenges for sore throat and cough   Warm tea/tea with honey for cough and sore throat   Warm water or salt water gargle for sore throat   If given antibiotic, take in its entirety until completion to decrease antibiotic resistance developing   Tylenol and ibuprofen for aches and pains and fever >100 F

## 2023-03-18 DIAGNOSIS — T36.95XA ANTIBIOTIC-INDUCED YEAST INFECTION: ICD-10-CM

## 2023-03-18 DIAGNOSIS — B37.9 ANTIBIOTIC-INDUCED YEAST INFECTION: ICD-10-CM

## 2023-03-20 RX ORDER — FLUCONAZOLE 150 MG/1
TABLET ORAL
Qty: 2 TABLET | Refills: 0 | OUTPATIENT
Start: 2023-03-20

## 2023-03-21 ENCOUNTER — TELEPHONE (OUTPATIENT)
Dept: FAMILY MEDICINE CLINIC | Age: 78
End: 2023-03-21

## 2023-03-21 DIAGNOSIS — T36.95XA ANTIBIOTIC-INDUCED YEAST INFECTION: ICD-10-CM

## 2023-03-21 DIAGNOSIS — B37.9 ANTIBIOTIC-INDUCED YEAST INFECTION: ICD-10-CM

## 2023-03-21 RX ORDER — FLUCONAZOLE 150 MG/1
150 TABLET ORAL
Qty: 2 TABLET | Refills: 0 | Status: SHIPPED | OUTPATIENT
Start: 2023-03-21 | End: 2023-03-27

## 2023-03-21 NOTE — TELEPHONE ENCOUNTER
----- Message from Lola Hay sent at 3/21/2023 10:31 AM EDT -----  Subject: Medication Problem    Medication: fluconazole (DIFLUCAN) 150 MG tablet  Dosage: TAKE ONE TABLET BY MOUTH EVERY 72 HOURS FOR 6 DAYS # DO NOT START   UNTIL THE DAY AFTER COMPLETION OF ANTIBIOTIC#  Ordering Provider: Joseph Ortega     Question/Problem: PT is reaching out because Pharmacy states they have not   received this prescription and PT would like this to be sent over again   please.        Pharmacy: [facility] [id number], [address] [place] [address] [phone]    ---------------------------------------------------------------------------  --------------  Alonso WILL  [phone]; OK to leave message on voicemail  ---------------------------------------------------------------------------  --------------    SCRIPT ANSWERS  Relationship to Patient: Self

## 2023-03-22 ENCOUNTER — OFFICE VISIT (OUTPATIENT)
Dept: FAMILY MEDICINE CLINIC | Age: 78
End: 2023-03-22
Payer: MEDICARE

## 2023-03-22 VITALS
SYSTOLIC BLOOD PRESSURE: 122 MMHG | OXYGEN SATURATION: 98 % | WEIGHT: 112.2 LBS | HEART RATE: 75 BPM | BODY MASS INDEX: 21.18 KG/M2 | DIASTOLIC BLOOD PRESSURE: 82 MMHG | HEIGHT: 61 IN

## 2023-03-22 DIAGNOSIS — J02.0 ACUTE STREPTOCOCCAL PHARYNGITIS: Primary | ICD-10-CM

## 2023-03-22 PROCEDURE — 1090F PRES/ABSN URINE INCON ASSESS: CPT | Performed by: NURSE PRACTITIONER

## 2023-03-22 PROCEDURE — G8427 DOCREV CUR MEDS BY ELIG CLIN: HCPCS | Performed by: NURSE PRACTITIONER

## 2023-03-22 PROCEDURE — 1036F TOBACCO NON-USER: CPT | Performed by: NURSE PRACTITIONER

## 2023-03-22 PROCEDURE — G8399 PT W/DXA RESULTS DOCUMENT: HCPCS | Performed by: NURSE PRACTITIONER

## 2023-03-22 PROCEDURE — 1123F ACP DISCUSS/DSCN MKR DOCD: CPT | Performed by: NURSE PRACTITIONER

## 2023-03-22 PROCEDURE — G8420 CALC BMI NORM PARAMETERS: HCPCS | Performed by: NURSE PRACTITIONER

## 2023-03-22 PROCEDURE — 99213 OFFICE O/P EST LOW 20 MIN: CPT | Performed by: NURSE PRACTITIONER

## 2023-03-22 PROCEDURE — G8484 FLU IMMUNIZE NO ADMIN: HCPCS | Performed by: NURSE PRACTITIONER

## 2023-03-22 RX ORDER — CLINDAMYCIN HYDROCHLORIDE 300 MG/1
300 CAPSULE ORAL 2 TIMES DAILY
Qty: 14 CAPSULE | Refills: 0 | Status: SHIPPED | OUTPATIENT
Start: 2023-03-22 | End: 2023-03-29

## 2023-03-22 ASSESSMENT — ENCOUNTER SYMPTOMS
SORE THROAT: 1
SINUS PAIN: 1
RHINORRHEA: 1
WHEEZING: 0
SHORTNESS OF BREATH: 0
COUGH: 0
SINUS PRESSURE: 1

## 2023-03-22 NOTE — PROGRESS NOTES
normal weight. HENT:      Right Ear: Tympanic membrane, ear canal and external ear normal. There is no impacted cerumen. Left Ear: Tympanic membrane, ear canal and external ear normal. There is no impacted cerumen. Nose: Congestion and rhinorrhea present. Mouth/Throat:      Mouth: Mucous membranes are moist.      Pharynx: Oropharyngeal exudate and posterior oropharyngeal erythema present. Eyes:      General: No scleral icterus. Right eye: No discharge. Left eye: No discharge. Extraocular Movements: Extraocular movements intact. Conjunctiva/sclera: Conjunctivae normal.      Pupils: Pupils are equal, round, and reactive to light. Neck:      Vascular: No carotid bruit. Cardiovascular:      Pulses: Normal pulses. Heart sounds: Normal heart sounds. No murmur heard. No gallop. Pulmonary:      Effort: Pulmonary effort is normal.      Breath sounds: Normal breath sounds. No wheezing. Lymphadenopathy:      Cervical: No cervical adenopathy. Neurological:      Mental Status: She is alert and oriented to person, place, and time. Psychiatric:         Mood and Affect: Mood normal.         Behavior: Behavior normal.         Thought Content: Thought content normal.         Judgment: Judgment normal.             This dictation was generated by voice recognition computer software. Although all attempts are made to edit the dictation for accuracy, there may be errors in the transcription that are not intended. An electronic signature was used to authenticate this note.     --GRISELDA Hernandez - CNP

## 2023-03-22 NOTE — PATIENT INSTRUCTIONS
You may receive a survey regarding the care you received during your visit. Your input is valuable to us. We encourage you to complete and return your survey. We hope you will choose us in the future for your healthcare needs. GENERAL OFFICE POLICIES      Telephone Calls: Messages will be answered within 1-2 business days, unless the provider is out of the office. If it is urgent a covering provider will answer. (this does not include Medication refills). MyChart: We recommend all patients sign up for [employer]. Through this portal you can see your lab results, request refills, schedule appointments, pay your bill and send messages to the office. [employer] messages will be answered within 1-2 business days unless the provider is out of the office. For urgent matters, please call the office. Appointments:  All appointments must be scheduled. We ask all patients to schedule their next follow up appointment before they leave the office to make sure you will be able to be seen before you run out of medications. 24 hours notice is required to cancel or reschedule an appointment to avoid being marked as a no show. You may be dismissed from the practice after 3 no shows. LATE for Appointment: If you are 15 or more minutes late for your appointment, you may be asked to reschedule. MA/LAB APPTS: Must be scheduled, cannot accept walk in lab visits. We only draw labs for patients established in our office. We only do injections for medications ordered by our office. Acute Sick Visits:  Nothing other than acute complaint will be addressed at this visit. TRADITIONAL MEDICARE  DOES NOT COVER PHYSICALS  MEDICARE WELLNESS VISITS: These are NOT physicals but the free annual visit offered by Medicare to discuss wellness issues. Medication refills, checkups, etc. will not be addressed during this visit.   Medication Refills: Refills are handled electronically so please contact your pharmacy for medication

## 2023-03-27 ENCOUNTER — TELEPHONE (OUTPATIENT)
Dept: FAMILY MEDICINE CLINIC | Age: 78
End: 2023-03-27

## 2023-03-27 ENCOUNTER — OFFICE VISIT (OUTPATIENT)
Dept: FAMILY MEDICINE CLINIC | Age: 78
End: 2023-03-27
Payer: MEDICARE

## 2023-03-27 VITALS
HEIGHT: 61 IN | OXYGEN SATURATION: 98 % | DIASTOLIC BLOOD PRESSURE: 80 MMHG | SYSTOLIC BLOOD PRESSURE: 122 MMHG | HEART RATE: 64 BPM | WEIGHT: 110 LBS | BODY MASS INDEX: 20.77 KG/M2

## 2023-03-27 DIAGNOSIS — E78.00 HYPERCHOLESTEREMIA: ICD-10-CM

## 2023-03-27 DIAGNOSIS — E03.4 HYPOTHYROIDISM DUE TO ACQUIRED ATROPHY OF THYROID: Primary | ICD-10-CM

## 2023-03-27 DIAGNOSIS — R73.03 PREDIABETES: ICD-10-CM

## 2023-03-27 DIAGNOSIS — E55.9 VITAMIN D DEFICIENCY: ICD-10-CM

## 2023-03-27 DIAGNOSIS — M81.0 AGE-RELATED OSTEOPOROSIS WITHOUT CURRENT PATHOLOGICAL FRACTURE: ICD-10-CM

## 2023-03-27 LAB
25(OH)D3 SERPL-MCNC: 59.7 NG/ML
ALBUMIN SERPL-MCNC: 4.1 G/DL (ref 3.4–5)
ALBUMIN/GLOB SERPL: 1.5 {RATIO} (ref 1.1–2.2)
ALP SERPL-CCNC: 97 U/L (ref 40–129)
ALT SERPL-CCNC: 14 U/L (ref 10–40)
ANION GAP SERPL CALCULATED.3IONS-SCNC: 12 MMOL/L (ref 3–16)
AST SERPL-CCNC: 21 U/L (ref 15–37)
BILIRUB SERPL-MCNC: 0.5 MG/DL (ref 0–1)
BUN SERPL-MCNC: 10 MG/DL (ref 7–20)
CALCIUM SERPL-MCNC: 9.3 MG/DL (ref 8.3–10.6)
CHLORIDE SERPL-SCNC: 98 MMOL/L (ref 99–110)
CHOLEST SERPL-MCNC: 194 MG/DL (ref 0–199)
CO2 SERPL-SCNC: 24 MMOL/L (ref 21–32)
CREAT SERPL-MCNC: 0.7 MG/DL (ref 0.6–1.2)
GFR SERPLBLD CREATININE-BSD FMLA CKD-EPI: >60 ML/MIN/{1.73_M2}
GLUCOSE SERPL-MCNC: 96 MG/DL (ref 70–99)
HDLC SERPL-MCNC: 76 MG/DL (ref 40–60)
LDLC SERPL CALC-MCNC: 107 MG/DL
POTASSIUM SERPL-SCNC: 4.5 MMOL/L (ref 3.5–5.1)
PROT SERPL-MCNC: 6.9 G/DL (ref 6.4–8.2)
SODIUM SERPL-SCNC: 134 MMOL/L (ref 136–145)
T4 FREE SERPL-MCNC: 1.3 NG/DL (ref 0.9–1.8)
TRIGL SERPL-MCNC: 55 MG/DL (ref 0–150)
TSH SERPL DL<=0.005 MIU/L-ACNC: 0.9 UIU/ML (ref 0.27–4.2)
VLDLC SERPL CALC-MCNC: 11 MG/DL

## 2023-03-27 PROCEDURE — 1090F PRES/ABSN URINE INCON ASSESS: CPT | Performed by: NURSE PRACTITIONER

## 2023-03-27 PROCEDURE — 36415 COLL VENOUS BLD VENIPUNCTURE: CPT | Performed by: NURSE PRACTITIONER

## 2023-03-27 PROCEDURE — G8420 CALC BMI NORM PARAMETERS: HCPCS | Performed by: NURSE PRACTITIONER

## 2023-03-27 PROCEDURE — 1036F TOBACCO NON-USER: CPT | Performed by: NURSE PRACTITIONER

## 2023-03-27 PROCEDURE — G8484 FLU IMMUNIZE NO ADMIN: HCPCS | Performed by: NURSE PRACTITIONER

## 2023-03-27 PROCEDURE — G8427 DOCREV CUR MEDS BY ELIG CLIN: HCPCS | Performed by: NURSE PRACTITIONER

## 2023-03-27 PROCEDURE — 1123F ACP DISCUSS/DSCN MKR DOCD: CPT | Performed by: NURSE PRACTITIONER

## 2023-03-27 PROCEDURE — 99213 OFFICE O/P EST LOW 20 MIN: CPT | Performed by: NURSE PRACTITIONER

## 2023-03-27 PROCEDURE — G8399 PT W/DXA RESULTS DOCUMENT: HCPCS | Performed by: NURSE PRACTITIONER

## 2023-03-27 ASSESSMENT — ENCOUNTER SYMPTOMS
SHORTNESS OF BREATH: 0
WHEEZING: 0

## 2023-03-27 NOTE — TELEPHONE ENCOUNTER
Pt has been taking a cough suppressant twice a day--it is [employer] Cough DM -ER    a 12 hour -this is a liquid. Since she is not coughing anymore should she stop taking this? She has been using this since March 23 .

## 2023-03-27 NOTE — PATIENT INSTRUCTIONS
refills even if current refills have been exhausted. If you are on a controlled medication you will be referred to a specialist (pain specialist, psychiatry, etc). Forms: There is a $35 fee to fill out FMLA/Disability paperwork, payable at time of . Instead of the fee, you can choose to have the paperwork filled out during a separate office visit that is for filling out the paperwork only. Medication Samples: This office does not carry medication samples. If you need assistance in getting your medications, then please let the medical assistant know so they can help you sign up for a drug assistance program that can help get medications at a reduced cost or even free (if you qualify). Workman's Comp Claims: We do not handle workman's comp cases or claims. You will need to go to an urgent care to be seen or to whomever your employer uses.   General - Any abusive/rude behavior toward staff/providers may be cause for dismissal.

## 2023-03-27 NOTE — PROGRESS NOTES
Cardiovascular:  Negative for chest pain, palpitations and leg swelling. Neurological:  Negative for dizziness, weakness, light-headedness, numbness and headaches. Psychiatric/Behavioral:  Negative for decreased concentration, dysphoric mood, self-injury, sleep disturbance and suicidal ideas. The patient is not nervous/anxious. Physical Exam  Constitutional:       General: She is not in acute distress. Appearance: Normal appearance. She is normal weight. HENT:      Mouth/Throat:      Mouth: Mucous membranes are moist.      Pharynx: Oropharynx is clear. No oropharyngeal exudate or posterior oropharyngeal erythema. Neck:      Vascular: No carotid bruit. Cardiovascular:      Pulses: Normal pulses. Heart sounds: Normal heart sounds. No murmur heard. No gallop. Pulmonary:      Effort: Pulmonary effort is normal.      Breath sounds: Normal breath sounds. No wheezing. Musculoskeletal:      Cervical back: Normal range of motion. Lymphadenopathy:      Cervical: No cervical adenopathy. Neurological:      Mental Status: She is alert and oriented to person, place, and time. Psychiatric:         Mood and Affect: Mood normal.         Behavior: Behavior normal.         Thought Content: Thought content normal.         Judgment: Judgment normal.             This dictation was generated by voice recognition computer software. Although all attempts are made to edit the dictation for accuracy, there may be errors in the transcription that are not intended. An electronic signature was used to authenticate this note.     --Quincy Brady, GRISELDA - SUZANNE

## 2023-03-28 DIAGNOSIS — E03.4 HYPOTHYROIDISM DUE TO ACQUIRED ATROPHY OF THYROID: ICD-10-CM

## 2023-03-28 LAB
EST. AVERAGE GLUCOSE BLD GHB EST-MCNC: 114 MG/DL
HBA1C MFR BLD: 5.6 %

## 2023-03-28 RX ORDER — LEVOTHYROXINE AND LIOTHYRONINE 57; 13.5 UG/1; UG/1
TABLET ORAL
Qty: 15 TABLET | Refills: 6 | Status: SHIPPED | OUTPATIENT
Start: 2023-03-28

## 2023-07-26 ENCOUNTER — OFFICE VISIT (OUTPATIENT)
Dept: ENT CLINIC | Age: 78
End: 2023-07-26

## 2023-07-26 VITALS
HEIGHT: 61 IN | TEMPERATURE: 98.6 F | SYSTOLIC BLOOD PRESSURE: 134 MMHG | DIASTOLIC BLOOD PRESSURE: 67 MMHG | OXYGEN SATURATION: 98 % | WEIGHT: 115 LBS | BODY MASS INDEX: 21.71 KG/M2 | HEART RATE: 91 BPM

## 2023-07-26 DIAGNOSIS — H90.0 CONDUCTIVE HEARING LOSS OF BOTH EARS: ICD-10-CM

## 2023-07-26 DIAGNOSIS — H61.23 IMPACTED CERUMEN OF BOTH EARS: Primary | ICD-10-CM

## 2023-07-26 NOTE — PROGRESS NOTES
Chief Complaint   Patient presents with    Cerumen Impaction    Hearing Loss       HISTORY:    Polo Costello stated that the hearing is decreased in both ears, which are plugged up with ear wax. EXAMINATION:    Both external ear canals were partially occluded by cerumen impaction, obscuring complete visualization of the TMs. PROCEDURE - REMOVAL OF BILATERAL CERUMEN IMPACTION:   The cerumen impaction was removed from both of the EACs, under otomicroscopy visualization, with instrumentation, using a Billeau wire loop. After successful cerumen removal, the EACs appeared to be normal and clear bilaterally without mass, exudate, or edema. The tympanic membranes appeared to be normal, including normal pneumatic mobility bilaterally. There was no evidence of acute disease. Polo Costello reported improved hearing, back to usual level, after cerumen removal.        HEARING ASSESSMENT:  Finger rub:  Able to hear finger rub bilaterally. Tuning fork tests, 512 Hertz tuning fork:  Smith was midline and Rinne air > bone bilaterally. IMPRESSION / Alayne Dakin / Leslie Mcgee / PROCEDURES:       Polo Costello was seen today for cerumen impaction and hearing loss. Diagnoses and all orders for this visit:    Impacted cerumen of both ears    Conductive hearing loss of both ears        RECOMMENDATIONS / PLAN:   See Patient Instructions on file for this visit. Return for recurrence of symptoms of excessive ear wax, or any other ear, nose, throat, or sinus problems.

## 2023-08-01 ENCOUNTER — TELEPHONE (OUTPATIENT)
Dept: FAMILY MEDICINE CLINIC | Age: 78
End: 2023-08-01

## 2023-08-01 ENCOUNTER — PROCEDURE VISIT (OUTPATIENT)
Dept: AUDIOLOGY | Age: 78
End: 2023-08-01
Payer: MEDICARE

## 2023-08-01 DIAGNOSIS — H90.3 SENSORINEURAL HEARING LOSS, BILATERAL: Primary | ICD-10-CM

## 2023-08-01 PROCEDURE — 92567 TYMPANOMETRY: CPT

## 2023-08-01 PROCEDURE — 92557 COMPREHENSIVE HEARING TEST: CPT

## 2023-08-01 NOTE — TELEPHONE ENCOUNTER
----- Message from Kindra Chavis sent at 8/1/2023  9:25 AM EDT -----  Subject: Appointment Request    Reason for Call: Established Patient Appointment needed: Semi-Routine Skin   Problem    QUESTIONS    Reason for appointment request? No appointments available during search     Additional Information for Provider? Pt has red don on her neck she would   like to get looked at. Dermatology can't see her her until October.  Please   advise.   ---------------------------------------------------------------------------  --------------  Rubina MARQUIS  [phone]; OK to leave message on voicemail  ---------------------------------------------------------------------------  --------------  SCRIPT ANSWERS

## 2023-08-01 NOTE — PROGRESS NOTES
Maia Maddox   1945, 65 y.o. female   [id number]       Referring Provider: Tao Khan MD  Referral Type: In an order in [address] [place] [address]    Reason for Visit: Evaluation of suspected change in hearing, tinnitus, or balance. ADULT AUDIOLOGIC EVALUATION      Maia Maddox is a 65 y.o. female seen today, 8/1/2023 , for an initial audiologic evaluation. AUDIOLOGIC AND OTHER PERTINENT MEDICAL HISTORY:      Maia Maddox reports recently being seen by Dr. Christiano Clifton on 7/26 for an ear cleaning. She notes having regular cleanings every 6 months. She denies any hearing difficulty, tinnitus, or other otologic symptoms at this time. She denied otalgia, aural fullness, otorrhea, tinnitus, dizziness, imbalance, history of noise exposure, history of head trauma, history of ear surgery, and family history of hearing loss    Date: 8/1/2023     IMPRESSIONS:      Today's results revealed symmetric sensorineural hearing loss bilaterally. Excellent speech understanding when in quiet. Tympanometry indicates normal middle ear function. Discussed test results and implications with patient. Hearing aids recommended at this time. Follow medical recommendations of Tao Khan MD.     ASSESSMENT AND FINDINGS:     Otoscopy unremarkable. RIGHT EAR:  Hearing Sensitivity: Normal sloping to severe sensorineural hearing loss   Speech Recognition Threshold: 25 dB HL  Word Recognition: Excellent 100%, based on NU-6 25-word list at 65 dBHL using recorded speech stimuli. Tympanometry: Normal peak pressure and compliance, Type A tympanogram, consistent with normal middle ear function. LEFT EAR:  Hearing Sensitivity: Normal sloping to severe sensorineural hearing loss   Speech Recognition Threshold: 20 dB HL  Word Recognition: Excellent 100%, based on NU-6 25-word list at 60 dBHL using recorded speech stimuli. Tympanometry: Normal peak pressure and compliance, Type A tympanogram, consistent with normal middle ear function.

## 2023-08-02 DIAGNOSIS — H91.90 HEARING LOSS, UNSPECIFIED HEARING LOSS TYPE, UNSPECIFIED LATERALITY: Primary | ICD-10-CM

## 2023-08-07 NOTE — PROGRESS NOTES
Tommie Delacruz (:  1945) is a 65 y.o. female,Established patient, here for evaluation of the following chief complaint(s): Other (Red spot on neck, doesn't want to wait for derm)       ASSESSMENT/PLAN:  1. Foot callus  Call established podiatrist  Soak foot in epsom salt daily, use loathe sponge after soaking    Return if symptoms worsen or fail to improve. Subjective   SUBJECTIVE/OBJECTIVE:  She had a red don on her neck on the right side  It happened a few weeks ago  Used Vaseline to help  No pain at this time  No itchiness   No fevers  Callus on left foot- inserts for her shoes about a year ago- she hasn't tried. She does have a podiatry. Skin Problem  This is a new problem. The current episode started 1 to 4 weeks ago. The affected locations include the neck. The rash is characterized by redness (resolved). It is unknown if there was an exposure to a precipitant. Pertinent negatives include no cough, fatigue or fever. Treatments tried: vaseline. The treatment provided significant relief. Review of Systems   Constitutional:  Negative for activity change, appetite change, chills, diaphoresis, fatigue and fever. Respiratory:  Negative for cough. Skin:  Positive for color change (was red). Callus on foot        Objective   Physical Exam  Vitals reviewed. HENT:      Head: Normocephalic. Right Ear: External ear normal.      Left Ear: External ear normal.      Nose: Nose normal.      Mouth/Throat:      Mouth: Mucous membranes are moist.      Pharynx: Oropharynx is clear. Eyes:      Extraocular Movements: Extraocular movements intact. Conjunctiva/sclera: Conjunctivae normal.      Pupils: Pupils are equal, round, and reactive to light. Cardiovascular:      Rate and Rhythm: Normal rate. Pulmonary:      Effort: Pulmonary effort is normal.   Abdominal:      General: Abdomen is flat. Musculoskeletal:         General: Normal range of motion.       Cervical back:

## 2023-08-08 ENCOUNTER — OFFICE VISIT (OUTPATIENT)
Dept: FAMILY MEDICINE CLINIC | Age: 78
End: 2023-08-08

## 2023-08-08 ENCOUNTER — TELEPHONE (OUTPATIENT)
Dept: FAMILY MEDICINE CLINIC | Age: 78
End: 2023-08-08

## 2023-08-08 VITALS
DIASTOLIC BLOOD PRESSURE: 70 MMHG | HEART RATE: 82 BPM | RESPIRATION RATE: 18 BRPM | WEIGHT: 115.2 LBS | BODY MASS INDEX: 21.75 KG/M2 | SYSTOLIC BLOOD PRESSURE: 114 MMHG | HEIGHT: 61 IN | OXYGEN SATURATION: 98 %

## 2023-08-08 DIAGNOSIS — L84 FOOT CALLUS: Primary | ICD-10-CM

## 2023-08-08 ASSESSMENT — ENCOUNTER SYMPTOMS
COUGH: 0
ROS SKIN COMMENTS: CALLUS ON FOOT
COLOR CHANGE: 1

## 2023-08-08 NOTE — PATIENT INSTRUCTIONS

## 2023-08-08 NOTE — TELEPHONE ENCOUNTER
----- Message from Nathan Garcia sent at 8/8/2023 12:29 PM EDT -----  Subject: Message to Provider    QUESTIONS  Information for Provider? Patient is calling to see if she is having   bloodwork done at her 9/28 appointment and if she needs to fast or not   take her medication. Please call to adivse  ---------------------------------------------------------------------------  --------------  Sanford WILL  [phone]; OK to leave message on voicemail  ---------------------------------------------------------------------------  --------------  SCRIPT ANSWERS  Relationship to Patient?  Self

## 2023-08-15 ENCOUNTER — TELEPHONE (OUTPATIENT)
Dept: FAMILY MEDICINE CLINIC | Age: 78
End: 2023-08-15

## 2023-08-15 NOTE — TELEPHONE ENCOUNTER
Pt bumped her arm on the door knob. It did bleed and it still has a scab. It is red around the spot. Pt used Peroxide on it today and it bubbled up a little. Should she come in to get this checked out. It is not hurting as bad as it was but still has a little pain. It is bruised a little-still red in one spot. It has gotten better. This happened last week -Friday . 4 days.     Should she be concerned      Please call pt

## 2023-08-15 NOTE — TELEPHONE ENCOUNTER
Px advised to monitor wound. If discharged, increased redness/pain or worsening on condition, px will schedule appt. Px informed some redness is normal.  Per LG.   AM

## 2023-09-12 ENCOUNTER — OFFICE VISIT (OUTPATIENT)
Dept: FAMILY MEDICINE CLINIC | Age: 78
End: 2023-09-12

## 2023-09-12 VITALS
DIASTOLIC BLOOD PRESSURE: 70 MMHG | OXYGEN SATURATION: 97 % | WEIGHT: 114 LBS | HEIGHT: 61 IN | SYSTOLIC BLOOD PRESSURE: 118 MMHG | HEART RATE: 68 BPM | BODY MASS INDEX: 21.52 KG/M2

## 2023-09-12 DIAGNOSIS — B35.4 TINEA CORPORIS: Primary | ICD-10-CM

## 2023-09-12 RX ORDER — CLOTRIMAZOLE AND BETAMETHASONE DIPROPIONATE 10; .64 MG/G; MG/G
CREAM TOPICAL
Qty: 15 G | Refills: 1 | Status: SHIPPED | OUTPATIENT
Start: 2023-09-12

## 2023-09-12 ASSESSMENT — ENCOUNTER SYMPTOMS
SHORTNESS OF BREATH: 0
PHOTOPHOBIA: 0
EYE PAIN: 0
WHEEZING: 0
BACK PAIN: 0
COLOR CHANGE: 0
COUGH: 0
EYE ITCHING: 0
NAUSEA: 0
CHEST TIGHTNESS: 0
VOMITING: 0
DIARRHEA: 0

## 2023-09-12 NOTE — PROGRESS NOTES
Cosimo Osgood (:  1945) is a 65 y.o. female,Established patient, here for evaluation of the following chief complaint(s):  Eye Problem (RED BALDEV ABOVE LEFT EYE X2 WEEKS, NO INJURY DOES NOT ITCH USING VASELINE ON IT )         ASSESSMENT/PLAN:  1. Tinea corporis  -Presentation consistent with ringworm. -Treatment options discussed. Clotrimazole betamethasone is being sent to the pharmacy. The medication uses and side effects were discussed with the patient. Patient verbalized understanding and agrees to the plan. -Recommended to avoid irritating area, wash hands more frequently, avoid scratching area. Keep area dry, try to avoid prolonged moisture.  -Follow-up if no improvement. -     clotrimazole-betamethasone (LOTRISONE) 1-0.05 % cream; Apply topically 2 times daily. , Disp-15 g, R-1, Normal      Return if symptoms worsen or fail to improve. Subjective   SUBJECTIVE/OBJECTIVE:  HPI  She has had a painful itchy rash over her left eye for about a week. It has not been getting much better with putting Vaseline on it. She has not been around anyone with rashes recently. She has not had this rash anywhere else. She is unsure if he had anything like this before. It is not affecting her vision. Review of Systems   Constitutional:  Negative for chills, diaphoresis, fatigue and fever. Eyes:  Negative for photophobia, pain, itching and visual disturbance. Respiratory:  Negative for cough, chest tightness, shortness of breath and wheezing. Cardiovascular:  Negative for chest pain and palpitations. Gastrointestinal:  Negative for diarrhea, nausea and vomiting. Endocrine: Negative for cold intolerance and heat intolerance. Musculoskeletal:  Negative for arthralgias, back pain, myalgias and neck stiffness. Skin:  Positive for rash (left forehead). Negative for color change, pallor and wound. Allergic/Immunologic: Negative for environmental allergies and immunocompromised state.

## 2023-09-12 NOTE — PATIENT INSTRUCTIONS

## 2023-09-16 DIAGNOSIS — A60.09 HERPES GENITALIS IN WOMEN: ICD-10-CM

## 2023-09-18 ENCOUNTER — TELEPHONE (OUTPATIENT)
Dept: FAMILY MEDICINE CLINIC | Age: 78
End: 2023-09-18

## 2023-09-18 RX ORDER — ACYCLOVIR 400 MG/1
400 TABLET ORAL DAILY
Qty: 90 TABLET | Refills: 0 | Status: SHIPPED | OUTPATIENT
Start: 2023-09-18

## 2023-09-18 NOTE — TELEPHONE ENCOUNTER
Px informed to continue medication for two weeks and call office if no improvement by then. Per LG. Patient advised on minimizing physical contact with other people and to call office with any further questions.   AM

## 2023-09-20 NOTE — TELEPHONE ENCOUNTER
PT CALLING AGAIN - STILL NO IMPROVEMENT -  NOW HER EYE IS PUFFED UP - WHEN WILL IT GO DOWN??  DO YOU NEED HER TO COME IN AND LOOK AT IT AGAIN?    MAYBE HAVE [facility] ANSWER     PT @  [phone]

## 2023-09-20 NOTE — TELEPHONE ENCOUNTER
Without looking at it myself I cannot really comment. If it is tinea as diagnosed by [facility], fungal infection can take quite a while to clear. If she has mychart, she could send a picture for me to evaluate to rule out cellulitis.

## 2023-09-21 NOTE — TELEPHONE ENCOUNTER
CALLED PT, EYE IS NOT WORSE, SHE JUST WANTED TO CALL AND MAKE SURE WE DIDN'T NEED TO SEE IT AGAIN. SHE HAS AN APPT WITH KENNET[facility] NEXT WEEK. Trino Parisi

## 2023-09-28 ENCOUNTER — OFFICE VISIT (OUTPATIENT)
Dept: FAMILY MEDICINE CLINIC | Age: 78
End: 2023-09-28

## 2023-09-28 VITALS
HEART RATE: 92 BPM | HEIGHT: 61 IN | WEIGHT: 115 LBS | DIASTOLIC BLOOD PRESSURE: 70 MMHG | BODY MASS INDEX: 21.71 KG/M2 | SYSTOLIC BLOOD PRESSURE: 110 MMHG | OXYGEN SATURATION: 97 %

## 2023-09-28 DIAGNOSIS — E03.4 HYPOTHYROIDISM DUE TO ACQUIRED ATROPHY OF THYROID: Primary | ICD-10-CM

## 2023-09-28 DIAGNOSIS — Z23 NEEDS FLU SHOT: ICD-10-CM

## 2023-09-28 DIAGNOSIS — R35.0 URINARY FREQUENCY: ICD-10-CM

## 2023-09-28 DIAGNOSIS — B35.4 TINEA CORPORIS: ICD-10-CM

## 2023-09-28 DIAGNOSIS — L84 FOOT CALLUS: ICD-10-CM

## 2023-09-28 LAB
BILIRUBIN, POC: NEGATIVE
BLOOD URINE, POC: NEGATIVE
CLARITY, POC: CLEAR
COLOR, POC: YELLOW
GLUCOSE URINE, POC: NEGATIVE
KETONES, POC: NEGATIVE
LEUKOCYTE EST, POC: NEGATIVE
NITRITE, POC: NEGATIVE
PH, POC: 7
PROTEIN, POC: NEGATIVE
SPECIFIC GRAVITY, POC: 1.01
UROBILINOGEN, POC: 0.2

## 2023-09-28 ASSESSMENT — ENCOUNTER SYMPTOMS
WHEEZING: 0
COUGH: 0
SHORTNESS OF BREATH: 0

## 2023-09-28 NOTE — PROGRESS NOTES
Nikki Garzon (:  1945) is a 65 y.o. female,Established patient, here for evaluation of the following chief complaint(s):  Hypothyroidism (FOLLOW UP ON HYPOTHYROID), Eye Problem (PT WANTS US TO LOOK AT HER LEFT EYE, HAD RECENT INFECTION ), and Urinary Tract Infection (Pt c/o urinary frequency )      ASSESSMENT/PLAN:  1. Hypothyroidism due to acquired atrophy of thyroid  -Controlled on current dose of [place] Thyroid. No changes. Not due for labs today. Will be due for labs at appointment in March. Has AWV scheduled in November. 2. Urinary frequency  -UA is not indicative of UTI. This is likely representative of overactive bladder. Discussed options including Kegel exercises and medication management. The patient preferred to try Kegel exercises and monitor for now. Follow-up as needed. -     POCT Urinalysis no Micro  3. Tinea corporis  -The area is still present but has essentially cleared. Continue to monitor symptoms for now. If symptoms worsen or are persistent for greater than 2 weeks, can do an additional 2 weeks of Lotrisone. Follow-up as needed. 4. Foot callus  -Follows with podiatry. Recommended continued management by them. 5. Needs flu shot  -     Influenza, FLUAD, (age 72 y+), IM, PF, 0.5 mL      Return in about 5 weeks (around 2023) for Annual Wellness Visit. SUBJECTIVE/OBJECTIVE:  KEERTHI  Eugenia Ortiz presents today for thyroid follow-up. She is also inquiring about some previous issues that were discussed in the office as well as urinary frequency. She states that she has been having increased urinary frequency recently. Not sure if it is due to her age or an infection. Finds that it is worse overnight. Denies any burning with urination. Denies any fevers or chills. Denies any abdominal pain. Denies any blood in the urine. The patient was recently seen for a rash to her face. She was diagnosed with tinea corporis and was prescribed Lotrisone.   She feels that the rash

## 2023-09-28 NOTE — PATIENT INSTRUCTIONS
You may receive a survey regarding the care you received during your visit. Your input is valuable to us. We encourage you to complete and return your survey. We hope you will choose us in the future for your healthcare needs. GENERAL OFFICE POLICIES      Telephone Calls: Messages will be answered within 1-2 business days, unless the provider is out of the office. If it is urgent a covering provider will answer. (this does not include Medication refills). MyChart: We recommend all patients sign up for [facility]. Through this portal you can see your lab results, request refills, schedule appointments, pay your bill and send messages to the office. [facility] messages will be answered within 1-2 business days unless the provider is out of the office. For urgent matters, please call the office. Appointments:  All appointments must be scheduled. We ask all patients to schedule their next follow up appointment before they leave the office to make sure you will be able to be seen before you run out of medications. 24 hours notice is required to cancel or reschedule an appointment to avoid being marked as a no show. You may be dismissed from the practice after 3 no shows. LATE for Appointment: If you are 15 or more minutes late for your appointment, you may be asked to reschedule. MA/LAB APPTS: Must be scheduled, cannot accept walk in lab visits. We only draw labs for patients established in our office. We only do injections for medications ordered by our office. Acute Sick Visits:  Nothing other than acute complaint will be addressed at this visit. TRADITIONAL MEDICARE  DOES NOT COVER PHYSICALS  MEDICARE WELLNESS VISITS: These are NOT physicals but the free annual visit offered by Medicare to discuss wellness issues. Medication refills, checkups, etc. will not be addressed during this visit.   Medication Refills: Refills are handled electronically so please contact your pharmacy for medication refills

## 2023-10-23 DIAGNOSIS — J02.0 ACUTE STREPTOCOCCAL PHARYNGITIS: ICD-10-CM

## 2023-10-23 RX ORDER — CLINDAMYCIN HYDROCHLORIDE 300 MG/1
CAPSULE ORAL
Qty: 14 CAPSULE | Refills: 0 | OUTPATIENT
Start: 2023-10-23

## 2023-11-02 ENCOUNTER — OFFICE VISIT (OUTPATIENT)
Dept: FAMILY MEDICINE CLINIC | Age: 78
End: 2023-11-02

## 2023-11-02 VITALS
BODY MASS INDEX: 21.9 KG/M2 | HEART RATE: 81 BPM | OXYGEN SATURATION: 98 % | DIASTOLIC BLOOD PRESSURE: 70 MMHG | WEIGHT: 116 LBS | SYSTOLIC BLOOD PRESSURE: 120 MMHG | HEIGHT: 61 IN

## 2023-11-02 DIAGNOSIS — A60.09 HERPES GENITALIS IN WOMEN: ICD-10-CM

## 2023-11-02 DIAGNOSIS — E03.4 HYPOTHYROIDISM DUE TO ACQUIRED ATROPHY OF THYROID: ICD-10-CM

## 2023-11-02 DIAGNOSIS — B35.4 TINEA CORPORIS: ICD-10-CM

## 2023-11-02 DIAGNOSIS — Z00.00 MEDICARE ANNUAL WELLNESS VISIT, SUBSEQUENT: Primary | ICD-10-CM

## 2023-11-02 RX ORDER — ACYCLOVIR 400 MG/1
400 TABLET ORAL DAILY
Qty: 90 TABLET | Refills: 1 | Status: SHIPPED | OUTPATIENT
Start: 2023-11-02

## 2023-11-02 RX ORDER — THYROID 90 MG/1
TABLET ORAL
Qty: 15 TABLET | Refills: 6 | Status: SHIPPED | OUTPATIENT
Start: 2023-11-02

## 2023-11-02 ASSESSMENT — PATIENT HEALTH QUESTIONNAIRE - PHQ9
2. FEELING DOWN, DEPRESSED OR HOPELESS: 0
1. LITTLE INTEREST OR PLEASURE IN DOING THINGS: 0
SUM OF ALL RESPONSES TO PHQ QUESTIONS 1-9: 0
SUM OF ALL RESPONSES TO PHQ QUESTIONS 1-9: 0
SUM OF ALL RESPONSES TO PHQ9 QUESTIONS 1 & 2: 0
SUM OF ALL RESPONSES TO PHQ QUESTIONS 1-9: 0
SUM OF ALL RESPONSES TO PHQ QUESTIONS 1-9: 0

## 2023-11-02 ASSESSMENT — LIFESTYLE VARIABLES
HOW MANY STANDARD DRINKS CONTAINING ALCOHOL DO YOU HAVE ON A TYPICAL DAY: 1 OR 2
HOW OFTEN DO YOU HAVE A DRINK CONTAINING ALCOHOL: 2-3 TIMES A WEEK

## 2023-11-02 NOTE — PROGRESS NOTES
Medicare Annual Wellness Visit    William Arellano is here for Medicare AWV [facility] WELLNESS VISIT)    Assessment & Plan   Medicare annual wellness visit, subsequent  -Health care topics addressed as below  -Encouraged to get shingles vaccine at the local pharmacy  -Follow-up in 6 months with fasting labs, or sooner if needed  Tinea corporis  -Resolving with Lotrisone. Encouraged to apply to the spot on the right side of the neck. Follow-up as needed. Hypothyroidism due to acquired atrophy of thyroid  -Controlled on current dose of [place] Thyroid. No changes today. Refill.  -     thyroid (ARMOUR THYROID) 90 MG tablet; TAKE ONE TABLET BY MOUTH EVERY OTHER DAY, Disp-15 tablet, R-6Normal  Herpes genitalis in women  -Controlled on current dose of acyclovir. No changes today. Refill.  -     acyclovir (ZOVIRAX) 400 MG tablet; Take 1 tablet by mouth daily, Disp-90 tablet, R-1Normal    Recommendations for Preventive Services Due: see orders and patient instructions/AVS.  Recommended screening schedule for the next 5-10 years is provided to the patient in written form: see Patient Instructions/AVS.     Return in about 6 months (around 5/2/2024) for Follow-up thyroid/fasting labs. Subjective   The following acute and/or chronic problems were also addressed today:  John Valenzuela presents today for Medicare annual wellness visits. She states that since last seen, she did redevelop the rash that she had had over the forehead. This time it was over her right eyelid. She used the Lotrisone cream which did get rid of the rash. She has another area to her right side of her neck which has been there for months which she is concerned about. She needs refills on medication as well. Patient's complete Health Risk Assessment and screening values have been reviewed and are found in Flowsheets.  The following problems were reviewed today and where indicated follow up appointments were made and/or referrals

## 2023-11-09 DIAGNOSIS — E03.4 HYPOTHYROIDISM DUE TO ACQUIRED ATROPHY OF THYROID: ICD-10-CM

## 2023-11-10 RX ORDER — THYROID 90 MG/1
TABLET ORAL
Qty: 15 TABLET | Refills: 6 | OUTPATIENT
Start: 2023-11-10

## 2023-11-10 NOTE — TELEPHONE ENCOUNTER
Disp Refills Start End    thyroid (TONIE THYROID) 90 MG tablet 15 tablet 6 11/2/2023     Sig: TAKE ONE TABLET BY MOUTH EVERY OTHER DAY    Sent to pharmacy as:  Thyroid 90 MG Oral Tablet (Tonie Thyroid)    E-Prescribing Status: Receipt confirmed by pharmacy (11/2/2023  2:79 PM EDT)    DUPLICATE REQUEST

## 2023-12-30 ENCOUNTER — HOSPITAL ENCOUNTER (OUTPATIENT)
Dept: WOMENS IMAGING | Age: 78
Discharge: HOME | End: 2023-12-30
Payer: MEDICARE

## 2023-12-30 VITALS — HEIGHT: 61 IN | BODY MASS INDEX: 21.52 KG/M2 | WEIGHT: 114 LBS

## 2023-12-30 DIAGNOSIS — Z12.31 SCREENING MAMMOGRAM FOR BREAST CANCER: ICD-10-CM

## 2023-12-30 PROCEDURE — 77063 BREAST TOMOSYNTHESIS BI: CPT

## 2024-01-25 ENCOUNTER — HOSPITAL ENCOUNTER (EMERGENCY)
Age: 79
Discharge: HOME | End: 2024-01-25
Attending: EMERGENCY MEDICINE
Payer: MEDICARE

## 2024-01-25 VITALS
HEART RATE: 76 BPM | OXYGEN SATURATION: 98 % | SYSTOLIC BLOOD PRESSURE: 154 MMHG | BODY MASS INDEX: 22.48 KG/M2 | WEIGHT: 119 LBS | RESPIRATION RATE: 18 BRPM | DIASTOLIC BLOOD PRESSURE: 71 MMHG | TEMPERATURE: 97.6 F

## 2024-01-25 DIAGNOSIS — T23.401S: Primary | ICD-10-CM

## 2024-01-25 PROCEDURE — 99282 EMERGENCY DEPT VISIT SF MDM: CPT
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
## 2024-01-26 NOTE — ED NOTES
1/26/24  Attempted to contact pt re: ED visit 1/25/24; call can not be completed @ this time.   pt's questions and concerns were addressed.  Increasing symptoms and return precautions associated with chief complaint and evaluation were reviewed with the patient in detail.  The patient demonstrated adequate understanding.          JR Alexsander ARREAGA, CHELY, ATC

## 2024-02-19 NOTE — PROGRESS NOTES
Chief Complaint   Patient presents with    Cerumen Impaction    Hearing Loss       HISTORY:    Brando Lam stated that the hearing is decreased in both ears, which are plugged up with ear wax. EXAMINATION:    Both external ear canals were occluded by cerumen impaction, obscuring visualization of the TMs. PROCEDURE - REMOVAL OF BILATERAL CERUMEN IMPACTION:   The cerumen impaction was removed from both of the EACs, under otomicroscopy visualization, with instrumentation, using a Billeau wire loop   After successful cerumen removal, the EACs appeared to be normal and clear bilaterally without mass, exudate, or edema. The tympanic membranes appeared to be normal, including normal pneumatic mobility bilaterally. There was no evidence of acute disease. Brando Lam reported improved hearing, back to usual level, after cerumen removal.         HEARING ASSESSMENT:  Finger rub:  Able to hear finger rub bilaterally. Tuning fork tests, 512 Hertz tuning fork:  Smith heard \"all over\" and Rinne air > bone bilaterally. IMPRESSION / Samule Sinner / Newt Feathers / PROCEDURES:       Brando Lam was seen today for cerumen impaction and hearing loss. Diagnoses and all orders for this visit:    Impacted cerumen of both ears    Conductive hearing loss of both ears        RECOMMENDATIONS / PLAN:   1. Proceed with hearing testing as scheduled. 2. See Patient Instructions on file for this visit. 3. Return for recheck/follow-up after hearing test and also in 6 months for recheck and possible ear cleaning. Leigh Avalos None

## 2024-02-21 ENCOUNTER — TELEPHONE (OUTPATIENT)
Dept: FAMILY MEDICINE CLINIC | Age: 79
End: 2024-02-21

## 2024-02-21 DIAGNOSIS — T36.95XA ANTIBIOTIC-INDUCED YEAST INFECTION: ICD-10-CM

## 2024-02-21 DIAGNOSIS — B37.9 ANTIBIOTIC-INDUCED YEAST INFECTION: ICD-10-CM

## 2024-02-21 DIAGNOSIS — B96.89 ACUTE BACTERIAL SINUSITIS: Primary | ICD-10-CM

## 2024-02-21 DIAGNOSIS — J01.90 ACUTE BACTERIAL SINUSITIS: Primary | ICD-10-CM

## 2024-02-21 RX ORDER — FLUCONAZOLE 150 MG/1
150 TABLET ORAL
Qty: 2 TABLET | Refills: 0 | Status: SHIPPED | OUTPATIENT
Start: 2024-02-21 | End: 2024-02-27

## 2024-02-21 RX ORDER — AZITHROMYCIN 250 MG/1
TABLET, FILM COATED ORAL
Qty: 6 TABLET | Refills: 0 | Status: SHIPPED | OUTPATIENT
Start: 2024-02-21 | End: 2024-03-02

## 2024-02-21 NOTE — TELEPHONE ENCOUNTER
PT C/O NASAL DRAINAGE: yes  it has been running all day  WHAT COLOR: white, COUGH: yes   BRINGING UP PHLEGM:  yes  IF YES, WHAT COLOR:  yellow    SORE THROAT:  no   PND:  no   HEADACHE:  no   EAR PAIN:  no   LEFT, RIGHT OR BOTH:  none   S.O.B.:  yes  WITH OR WITHOUT EXERTION:  with    WHEEZING:  no   HEAD CONGESTION:  yes   CHEST CONGESTION:  yes   BODY ACHES:  no   VOMITTING no   DIARRHEA:  no   TEMP:  no  IF SO, HIGHEST TEMP:  she does not know    SYMPTOMS FOR HOW MANY DAYS:  1 week   WHAT MEDS HAS PT TRIED:  Muscinex , throat lozenge   MED ALLERGIES:  yes   PT OFFERED:  no--none available   VERIFY PHARMACY INFORMATION:    She has had the runny nose all week.  The pharmacy told her to try Muscina,  that did  not help.   This has been going on a week.    Pt said it will be a fast appointment.    Samir [phone]

## 2024-05-02 ENCOUNTER — OFFICE VISIT (OUTPATIENT)
Dept: FAMILY MEDICINE CLINIC | Age: 79
End: 2024-05-02
Payer: MEDICARE

## 2024-05-02 VITALS
HEIGHT: 61 IN | SYSTOLIC BLOOD PRESSURE: 124 MMHG | DIASTOLIC BLOOD PRESSURE: 82 MMHG | HEART RATE: 72 BPM | WEIGHT: 111 LBS | BODY MASS INDEX: 20.96 KG/M2 | OXYGEN SATURATION: 98 %

## 2024-05-02 DIAGNOSIS — A60.09 HERPES GENITALIS IN WOMEN: ICD-10-CM

## 2024-05-02 DIAGNOSIS — E78.00 HYPERCHOLESTEREMIA: ICD-10-CM

## 2024-05-02 DIAGNOSIS — M21.611 BILATERAL BUNIONS: ICD-10-CM

## 2024-05-02 DIAGNOSIS — E55.9 VITAMIN D DEFICIENCY: ICD-10-CM

## 2024-05-02 DIAGNOSIS — E03.4 HYPOTHYROIDISM DUE TO ACQUIRED ATROPHY OF THYROID: Primary | ICD-10-CM

## 2024-05-02 DIAGNOSIS — R73.03 PREDIABETES: ICD-10-CM

## 2024-05-02 DIAGNOSIS — M21.612 BILATERAL BUNIONS: ICD-10-CM

## 2024-05-02 LAB
25(OH)D3 SERPL-MCNC: 53.1 NG/ML
ALBUMIN SERPL-MCNC: 4.3 G/DL (ref 3.4–5)
ALBUMIN/GLOB SERPL: 2.2 {RATIO} (ref 1.1–2.2)
ALP SERPL-CCNC: 94 U/L (ref 40–129)
ALT SERPL-CCNC: 15 U/L (ref 10–40)
ANION GAP SERPL CALCULATED.3IONS-SCNC: 12 MMOL/L (ref 3–16)
AST SERPL-CCNC: 20 U/L (ref 15–37)
BILIRUB SERPL-MCNC: 0.6 MG/DL (ref 0–1)
BUN SERPL-MCNC: 8 MG/DL (ref 7–20)
CALCIUM SERPL-MCNC: 9.3 MG/DL (ref 8.3–10.6)
CHLORIDE SERPL-SCNC: 98 MMOL/L (ref 99–110)
CHOLEST SERPL-MCNC: 199 MG/DL (ref 0–199)
CO2 SERPL-SCNC: 24 MMOL/L (ref 21–32)
CREAT SERPL-MCNC: 0.5 MG/DL (ref 0.6–1.2)
GFR SERPLBLD CREATININE-BSD FMLA CKD-EPI: >90 ML/MIN/{1.73_M2}
GLUCOSE SERPL-MCNC: 100 MG/DL (ref 70–99)
HDLC SERPL-MCNC: 92 MG/DL (ref 40–60)
LDLC SERPL CALC-MCNC: 97 MG/DL
POTASSIUM SERPL-SCNC: 4.5 MMOL/L (ref 3.5–5.1)
PROT SERPL-MCNC: 6.3 G/DL (ref 6.4–8.2)
SODIUM SERPL-SCNC: 134 MMOL/L (ref 136–145)
TRIGL SERPL-MCNC: 49 MG/DL (ref 0–150)
VLDLC SERPL CALC-MCNC: 10 MG/DL

## 2024-05-02 PROCEDURE — 99214 OFFICE O/P EST MOD 30 MIN: CPT | Performed by: NURSE PRACTITIONER

## 2024-05-02 PROCEDURE — 1090F PRES/ABSN URINE INCON ASSESS: CPT | Performed by: NURSE PRACTITIONER

## 2024-05-02 PROCEDURE — G8427 DOCREV CUR MEDS BY ELIG CLIN: HCPCS | Performed by: NURSE PRACTITIONER

## 2024-05-02 PROCEDURE — 36415 COLL VENOUS BLD VENIPUNCTURE: CPT | Performed by: NURSE PRACTITIONER

## 2024-05-02 PROCEDURE — 1123F ACP DISCUSS/DSCN MKR DOCD: CPT | Performed by: NURSE PRACTITIONER

## 2024-05-02 PROCEDURE — G8420 CALC BMI NORM PARAMETERS: HCPCS | Performed by: NURSE PRACTITIONER

## 2024-05-02 PROCEDURE — G8399 PT W/DXA RESULTS DOCUMENT: HCPCS | Performed by: NURSE PRACTITIONER

## 2024-05-02 PROCEDURE — 1036F TOBACCO NON-USER: CPT | Performed by: NURSE PRACTITIONER

## 2024-05-02 RX ORDER — THYROID 90 MG/1
TABLET ORAL
Qty: 15 TABLET | Refills: 6 | Status: SHIPPED | OUTPATIENT
Start: 2024-05-02

## 2024-05-02 RX ORDER — ACYCLOVIR 400 MG/1
400 TABLET ORAL DAILY
Qty: 90 TABLET | Refills: 1 | Status: SHIPPED | OUTPATIENT
Start: 2024-05-02

## 2024-05-02 SDOH — ECONOMIC STABILITY: INCOME INSECURITY: HOW HARD IS IT FOR YOU TO PAY FOR THE VERY BASICS LIKE FOOD, HOUSING, MEDICAL CARE, AND HEATING?: NOT HARD AT ALL

## 2024-05-02 SDOH — ECONOMIC STABILITY: FOOD INSECURITY: WITHIN THE PAST 12 MONTHS, YOU WORRIED THAT YOUR FOOD WOULD RUN OUT BEFORE YOU GOT MONEY TO BUY MORE.: NEVER TRUE

## 2024-05-02 SDOH — ECONOMIC STABILITY: FOOD INSECURITY: WITHIN THE PAST 12 MONTHS, THE FOOD YOU BOUGHT JUST DIDN'T LAST AND YOU DIDN'T HAVE MONEY TO GET MORE.: NEVER TRUE

## 2024-05-02 ASSESSMENT — ENCOUNTER SYMPTOMS
SORE THROAT: 0
SHORTNESS OF BREATH: 0
DIARRHEA: 0
COUGH: 0
ABDOMINAL PAIN: 0
EYE REDNESS: 0
SINUS PAIN: 0
EYE PAIN: 0
WHEEZING: 0
VOMITING: 0
NAUSEA: 0
ABDOMINAL DISTENTION: 0
EYE DISCHARGE: 0
SINUS PRESSURE: 0

## 2024-05-02 ASSESSMENT — PATIENT HEALTH QUESTIONNAIRE - PHQ9
2. FEELING DOWN, DEPRESSED OR HOPELESS: NOT AT ALL
SUM OF ALL RESPONSES TO PHQ QUESTIONS 1-9: 0
SUM OF ALL RESPONSES TO PHQ QUESTIONS 1-9: 0
1. LITTLE INTEREST OR PLEASURE IN DOING THINGS: NOT AT ALL
SUM OF ALL RESPONSES TO PHQ9 QUESTIONS 1 & 2: 0
SUM OF ALL RESPONSES TO PHQ QUESTIONS 1-9: 0
SUM OF ALL RESPONSES TO PHQ QUESTIONS 1-9: 0

## 2024-05-02 NOTE — PROGRESS NOTES
Carmelina Quesada (:  1945) is a 78 y.o. female,Established patient, here for evaluation of the following chief complaint(s):  Hypothyroidism (FOLLOW UP ON THYROID )      ASSESSMENT/PLAN:  1. Hypothyroidism due to acquired atrophy of thyroid  -Historically controlled on current dose of [place] Thyroid.  Takes every other day.  Due for labs.  No changes pending results.  -     thyroid (ARMOUR THYROID) 90 MG tablet; TAKE ONE TABLET BY MOUTH EVERY OTHER DAY, Disp-15 tablet, R-6Normal  2. Bilateral bunions  -Is now established with podiatry.  Having exacerbation of symptoms.  Encouraged to contact them for additional intervention and follow-up.  3. Herpes genitalis in women  -Controlled with acyclovir.  No changes today.  Refill.  Follow-up in 6 months at [facility].  -     acyclovir (ZOVIRAX) 400 MG tablet; Take 1 tablet by mouth daily, Disp-90 tablet, R-1Normal  4. Vitamin D deficiency  -Continues vitamin D supplementation.  No changes today.  Due for labs.  No adjustments pending results.  -     Vitamin D 25 Hydroxy; Future  5. Prediabetes  -Not treated at this time.  Due for labs.  No intervention pending results.  -     Comprehensive Metabolic Panel; Future  -     Hemoglobin A1C; Future  6. Hypercholesteremia  -Not treated at this time.  Due for labs.  No intervention pending results.  -     Lipid Panel; Future      Return in about 6 months (around 11/3/2024) for Medicare Annual Wellness Visit.    SUBJECTIVE/OBJECTIVE:  KEERTHI Cosme presents today for chronic condition follow-up.  Since last being seen, her dentist retired and she needs to establish with a new one.  She had previously been told that she needed antibiotics prior to any dental procedures because of her hip replacement, but she states that the new dentist is telling her that she does not need to do this.  Inquiring whether this is accurate.  She did follow with podiatry since last seen.  Had the wart to the bottom of her foot removed.  She notes that she

## 2024-05-02 NOTE — PATIENT INSTRUCTIONS
You may receive a survey regarding the care you received during your visit.  Your input is valuable to us.  We encourage you to complete and return your survey.  We hope you will choose us in the future for your healthcare needs. GENERAL OFFICE POLICIES      Telephone Calls: Messages will be answered within 1-2 business days, unless the provider is out of the office.  If it is urgent a covering provider will answer. (this does not include Medication refills).    MyChart:  We recommend all patients sign up for [employer].  Through this portal you can see your lab results, request refills, schedule appointments, pay your bill and send messages to the office.   [employer] messages will be answered within 1-2 business days unless the provider is out of the office.  For urgent matters, please call the office.  Appointments:  All appointments must be scheduled.  We ask all patients to schedule their next follow up appointment before they leave the office to make sure you will be able to be seen before you run out of medications.  24 hours notice is required to cancel or reschedule an appointment to avoid being marked as a no show.  You may be dismissed from the practice after 3 no shows.    LATE for Appointment: If you are 15 or more minutes late for your appointment, you may be asked to reschedule.  MA/LAB APPTS: Must be scheduled, cannot accept walk in lab visits.  We only draw labs for patients established in our office.  We only do injections for medications ordered by our office.  Acute Sick Visits:  Nothing other than acute complaint will be addressed at this visit.  TRADITIONAL MEDICARE  DOES NOT COVER PHYSICALS  MEDICARE WELLNESS VISITS: These are NOT physicals but the free annual visit offered by Medicare to discuss wellness issues. Medication refills, checkups, etc. will not be addressed during this visit.  Medication Refills: Refills are handled electronically so please contact your pharmacy for medication refills

## 2024-05-03 DIAGNOSIS — E03.4 HYPOTHYROIDISM DUE TO ACQUIRED ATROPHY OF THYROID: ICD-10-CM

## 2024-05-03 LAB
EST. AVERAGE GLUCOSE BLD GHB EST-MCNC: 108.3 MG/DL
HBA1C MFR BLD: 5.4 %
TSH SERPL DL<=0.005 MIU/L-ACNC: 1.03 UIU/ML (ref 0.27–4.2)

## 2024-05-10 ENCOUNTER — TELEPHONE (OUTPATIENT)
Dept: FAMILY MEDICINE CLINIC | Age: 79
End: 2024-05-10

## 2024-05-10 NOTE — TELEPHONE ENCOUNTER
She is due because they are recommending over 65 to get a booster of the new one 4 months after receiving

## 2024-05-10 NOTE — TELEPHONE ENCOUNTER
Patient was calling because while she was at the pharmacy getting her last shingles vaccine they were telling her she is due for the new Covid shot. She says she has been up to date on all of them and was wondering if we know about the newest one and if she should get it.     Can we please give her a call back

## 2024-07-24 ENCOUNTER — TELEPHONE (OUTPATIENT)
Dept: FAMILY MEDICINE CLINIC | Age: 79
End: 2024-07-24

## 2024-07-24 NOTE — TELEPHONE ENCOUNTER
Patient was calling because when she woke up she felt a bump on her left but cheek. She said it swelled to around 3 inches and it was red. She did put some Cortizone cream on it and it went down. She doesn't know if she needs to watch it or if she needs to go somewhere. Its not itching or painful now. Before she put the cream on it was kind of sore. She said she doesn't know if its a spider bite.     Can we please give her a call

## 2024-07-24 NOTE — TELEPHONE ENCOUNTER
Hydrocortisone cream may reduce inflammation. Don't blindly squeeze on it as this could cause more inflammation.  Keep clean and avoid pressure/ friction over the area and if worsens (gets bigger/ more painful) or fails to improve significantly by early next week, please make appointment.  Ronnie Rangel

## 2024-07-25 ENCOUNTER — OFFICE VISIT (OUTPATIENT)
Dept: FAMILY MEDICINE CLINIC | Age: 79
End: 2024-07-25
Payer: MEDICARE

## 2024-07-25 VITALS
DIASTOLIC BLOOD PRESSURE: 70 MMHG | WEIGHT: 112 LBS | HEART RATE: 86 BPM | SYSTOLIC BLOOD PRESSURE: 118 MMHG | BODY MASS INDEX: 21.14 KG/M2 | OXYGEN SATURATION: 98 % | HEIGHT: 61 IN

## 2024-07-25 DIAGNOSIS — I83.93 VARICOSE VEINS OF BOTH LOWER EXTREMITIES, UNSPECIFIED WHETHER COMPLICATED: ICD-10-CM

## 2024-07-25 DIAGNOSIS — T63.304A SPIDER BITE WOUND, UNDETERMINED INTENT, INITIAL ENCOUNTER: Primary | ICD-10-CM

## 2024-07-25 PROCEDURE — 1123F ACP DISCUSS/DSCN MKR DOCD: CPT | Performed by: FAMILY MEDICINE

## 2024-07-25 PROCEDURE — G2211 COMPLEX E/M VISIT ADD ON: HCPCS | Performed by: FAMILY MEDICINE

## 2024-07-25 PROCEDURE — 99213 OFFICE O/P EST LOW 20 MIN: CPT | Performed by: FAMILY MEDICINE

## 2024-07-25 NOTE — PROGRESS NOTES
veins d/w pt - no sxs presently - consider compression stockings  D/w pt utd on vaccines - needs flu/ covid boosters September/ early october  Ronnie Rangel MD, MD  7/25/2024  8:15 AM

## 2024-07-31 ENCOUNTER — OFFICE VISIT (OUTPATIENT)
Dept: ENT CLINIC | Age: 79
End: 2024-07-31
Payer: MEDICARE

## 2024-07-31 VITALS
HEART RATE: 83 BPM | HEIGHT: 61 IN | BODY MASS INDEX: 21.14 KG/M2 | TEMPERATURE: 98 F | SYSTOLIC BLOOD PRESSURE: 135 MMHG | WEIGHT: 112 LBS | OXYGEN SATURATION: 97 % | DIASTOLIC BLOOD PRESSURE: 82 MMHG

## 2024-07-31 DIAGNOSIS — H90.0 CONDUCTIVE HEARING LOSS OF BOTH EARS: ICD-10-CM

## 2024-07-31 DIAGNOSIS — H61.23 IMPACTED CERUMEN OF BOTH EARS: Primary | ICD-10-CM

## 2024-07-31 PROCEDURE — 69210 REMOVE IMPACTED EAR WAX UNI: CPT | Performed by: OTOLARYNGOLOGY

## 2024-07-31 NOTE — PROGRESS NOTES
CHIEF COMPLAINT:  Chief Complaint   Patient presents with    Cerumen Impaction    Hearing Loss       HISTORY:    Carmelina stated that the hearing is decreased in both ears, which are plugged up with ear wax.      EXAMINATION:    Both external ear canals were occluded by cerumen impaction, obscuring visualization of the TMs.        PROCEDURE - REMOVAL OF BILATERAL CERUMEN IMPACTION (CPT 65049):   The cerumen impaction was removed from both of the EACs, under otomicroscopic visualization, with instrumentation, using a Billeau wire loop.  After successful cerumen removal, the EACs appeared to be normal and clear bilaterally without mass, exudate, or edema.  The tympanic membranes appeared to be normal, including normal pneumatic mobility bilaterally.  There was no evidence of acute disease.  Carmelina reported improved hearing, back to usual level, after cerumen removal.          HEARING ASSESSMENT:  Finger rub:  Able to hear finger rub bilaterally. Tuning fork tests, 512 Hertz tuning fork:  Smith was midline and Rinne air > bone bilaterally.          IMPRESSION / DIAGNOSES / ORDERS / PROCEDURES:       Carmelina was seen today for cerumen impaction and hearing loss.    Diagnoses and all orders for this visit:    Impacted cerumen of both ears    Conductive hearing loss of both ears        RECOMMENDATIONS / PLAN:   See Patient Instructions on file for this visit.  Return for recurrence of symptoms of excessive ear wax, or any other ear, nose, throat, or sinus problems.

## 2024-08-08 ENCOUNTER — TELEPHONE (OUTPATIENT)
Dept: FAMILY MEDICINE CLINIC | Age: 79
End: 2024-08-08

## 2024-08-08 NOTE — TELEPHONE ENCOUNTER
Patient called [facility] and they told her that we needed approved for her to have a colonoscopy. Please give her a call back.

## 2024-08-23 ENCOUNTER — TELEPHONE (OUTPATIENT)
Dept: FAMILY MEDICINE CLINIC | Age: 79
End: 2024-08-23

## 2024-08-23 NOTE — TELEPHONE ENCOUNTER
It just depends on how she is feeling.  If she feels like things are getting better, she can continue the monitor, RICE, and NSAIDs.  If she does not feel like it is better at all, an x-ray would be reasonable as well as an evaluation.  May have an benefit from a steroid injection.  [facility] does have an after-hours Ortho clinic if she would want to be seen and evaluated over the weekend.

## 2024-08-23 NOTE — TELEPHONE ENCOUNTER
Pt fell on her knee (left) Aug 6, 2024.  She said she has been putting ice in it.  It hurts on and off.  There is no bruise showing.  She is wanting to know if you  want to see her or if you just want to order an xray.   She is wanting to know if it could be a bruise and just wait a little while longer for it to heal.  Or should she go to an orthopedic?      Please call     She called Medicare  and they suggested she call her primary care.

## 2024-08-26 ENCOUNTER — OFFICE VISIT (OUTPATIENT)
Dept: FAMILY MEDICINE CLINIC | Age: 79
End: 2024-08-26
Payer: MEDICARE

## 2024-08-26 VITALS
HEART RATE: 81 BPM | HEIGHT: 61 IN | SYSTOLIC BLOOD PRESSURE: 130 MMHG | WEIGHT: 112.8 LBS | OXYGEN SATURATION: 97 % | DIASTOLIC BLOOD PRESSURE: 68 MMHG | BODY MASS INDEX: 21.3 KG/M2

## 2024-08-26 DIAGNOSIS — S83.412A SPRAIN OF MEDIAL COLLATERAL LIGAMENT OF LEFT KNEE, INITIAL ENCOUNTER: Primary | ICD-10-CM

## 2024-08-26 PROCEDURE — G8427 DOCREV CUR MEDS BY ELIG CLIN: HCPCS | Performed by: NURSE PRACTITIONER

## 2024-08-26 PROCEDURE — G8399 PT W/DXA RESULTS DOCUMENT: HCPCS | Performed by: NURSE PRACTITIONER

## 2024-08-26 PROCEDURE — G8420 CALC BMI NORM PARAMETERS: HCPCS | Performed by: NURSE PRACTITIONER

## 2024-08-26 PROCEDURE — 1123F ACP DISCUSS/DSCN MKR DOCD: CPT | Performed by: NURSE PRACTITIONER

## 2024-08-26 PROCEDURE — 99213 OFFICE O/P EST LOW 20 MIN: CPT | Performed by: NURSE PRACTITIONER

## 2024-08-26 PROCEDURE — 1036F TOBACCO NON-USER: CPT | Performed by: NURSE PRACTITIONER

## 2024-08-26 PROCEDURE — 1090F PRES/ABSN URINE INCON ASSESS: CPT | Performed by: NURSE PRACTITIONER

## 2024-08-26 RX ORDER — NEOMYCIN/POLYMYXIN B/HYDROCORT 3.5-10K-1
SUSPENSION, DROPS(FINAL DOSAGE FORM)(ML) OPHTHALMIC (EYE)
COMMUNITY
Start: 2024-08-21

## 2024-08-26 ASSESSMENT — ENCOUNTER SYMPTOMS
COUGH: 0
SHORTNESS OF BREATH: 0
WHEEZING: 0

## 2024-08-26 NOTE — PROGRESS NOTES
Carmelina Quesada (:  1945) is a 78 y.o. female,Established patient, here for evaluation of the following chief complaint(s):  Pain (Pt C/O left knee pain x 20 days- fall 24)      ASSESSMENT/PLAN:  1. Sprain of medial collateral ligament of left knee, initial encounter  -Presentation is consistent with a sprain which is resolving on its own. Mild medial joint line tenderness.  No laxity. Encouraged NSAIDs and RICE.  Exercises provided. Follow-up as needed.    Return in about 10 weeks (around 2024) for Medicare Annual Wellness already scheduled.    SUBJECTIVE/OBJECTIVE:  KEERTHI Cosme presents today for evaluation of left knee pain.  She states on  she was at a show at the movie theater with a friend.  She was getting up out of her seat to leave the theater when her knee gave out and she fell.  She denies any in her head.  States she was having left knee pain since that time.  It has been progressively getting better.  Wanted to have it checked to make sure she did not have any complications.  Is able to bear weight without issue.  Denies any numbness or tingling.  Denies any bruising at this time.  States that she had a bruise to the back of her leg when it initially occurred.      Review of Systems   Constitutional:  Negative for chills and fever.   Respiratory:  Negative for cough, shortness of breath and wheezing.    Cardiovascular:  Negative for chest pain, palpitations and leg swelling.   Musculoskeletal:  Positive for arthralgias. Negative for gait problem, joint swelling and myalgias.   Neurological:  Negative for dizziness, weakness, light-headedness, numbness and headaches.   Psychiatric/Behavioral:  Negative for decreased concentration, dysphoric mood, self-injury, sleep disturbance and suicidal ideas. The patient is not nervous/anxious.        Physical Exam  Constitutional:       General: She is not in acute distress.     Appearance: Normal appearance. She is normal weight.   Pulmonary:

## 2024-08-26 NOTE — PATIENT INSTRUCTIONS

## 2024-09-17 NOTE — PROGRESS NOTES
Patient reached ____ yes  _X____ no   VM instructions left __X__ yes   phone number [phone]      Date __9/26/24_______  Time _1040______  Arrival ___0910  [facility]    Nothing to eat or drink after midnight-follow your doctors prep instructions-this may include taking a second dose of your prep after midnight  Responsible adult 18 or older to stay on site while you are here-drive you home-stay with you after  Follow any instructions your doctors office has given you  Bring a complete list of all your medications and supplements including name,dose,how often taken the day of your procedure  If you normally take the following medications in the morning please do so the AM of your procedure with a small sip of water       Heart,blood pressure,seizure,thyroid or breathing medications-use your inhalers-bring any rescue inhalers with you DOS       DO NOT take blood pressure medications ending in \"kristina\" or \"pril\" the AM of procedure or evening prior  Dr Wilkerson patients are not to take any medications the AM of surgery  Take half or your normal dose of any long acting insulins the night before your procedure-do not take any diabetic medications the AM of procedure. If you take a weekly injection for diabetes or weight loss-do not take one week prior to surgery/procedure.If you have already taken your injection this week,contact your surgeon  Follow your doctors instructions regarding stopping or taking  any blood thinners-if you do not have instructions-call them  Any questions call your doctor  Other ______________________________________________________________      VISITOR POLICY(subject to change)             The current policy is 2 visitors per patient.There are no children allowed.Mask at discretion of facility. Visiting hours are 8a-8p.Overnight visitors will be at the discretion of the nurse. All policies are subject to change.

## 2024-09-26 ENCOUNTER — ANESTHESIA (OUTPATIENT)
Dept: ENDOSCOPY | Age: 79
End: 2024-09-26
Payer: MEDICARE

## 2024-09-26 ENCOUNTER — ANESTHESIA EVENT (OUTPATIENT)
Dept: ENDOSCOPY | Age: 79
End: 2024-09-26
Payer: MEDICARE

## 2024-09-26 ENCOUNTER — HOSPITAL ENCOUNTER (OUTPATIENT)
Age: 79
Setting detail: OUTPATIENT SURGERY
Discharge: HOME | End: 2024-09-26
Attending: INTERNAL MEDICINE | Admitting: INTERNAL MEDICINE
Payer: MEDICARE

## 2024-09-26 VITALS
TEMPERATURE: 97.7 F | SYSTOLIC BLOOD PRESSURE: 135 MMHG | DIASTOLIC BLOOD PRESSURE: 73 MMHG | OXYGEN SATURATION: 100 % | RESPIRATION RATE: 13 BRPM | HEART RATE: 66 BPM

## 2024-09-26 DIAGNOSIS — Z86.0100 HISTORY OF COLON POLYPS: ICD-10-CM

## 2024-09-26 DIAGNOSIS — Z86.010 HISTORY OF COLON POLYPS: ICD-10-CM

## 2024-09-26 PROCEDURE — 3609010600 HC COLONOSCOPY POLYPECTOMY SNARE/COLD BIOPSY: Performed by: INTERNAL MEDICINE

## 2024-09-26 PROCEDURE — 2500000003 HC RX 250 WO HCPCS: Performed by: NURSE ANESTHETIST, CERTIFIED REGISTERED

## 2024-09-26 PROCEDURE — 7100000011 HC PHASE II RECOVERY - ADDTL 15 MIN: Performed by: INTERNAL MEDICINE

## 2024-09-26 PROCEDURE — 3700000001 HC ADD 15 MINUTES (ANESTHESIA): Performed by: INTERNAL MEDICINE

## 2024-09-26 PROCEDURE — 3700000000 HC ANESTHESIA ATTENDED CARE: Performed by: INTERNAL MEDICINE

## 2024-09-26 PROCEDURE — 88305 TISSUE EXAM BY PATHOLOGIST: CPT

## 2024-09-26 PROCEDURE — 2580000003 HC RX 258: Performed by: NURSE ANESTHETIST, CERTIFIED REGISTERED

## 2024-09-26 PROCEDURE — 3609019800 HC COLONOSCOPY WITH SUBMUCOSAL INJECTION: Performed by: INTERNAL MEDICINE

## 2024-09-26 PROCEDURE — 7100000010 HC PHASE II RECOVERY - FIRST 15 MIN: Performed by: INTERNAL MEDICINE

## 2024-09-26 PROCEDURE — 3609010300 HC COLONOSCOPY W/BIOPSY SINGLE/MULTIPLE: Performed by: INTERNAL MEDICINE

## 2024-09-26 PROCEDURE — 2709999900 HC NON-CHARGEABLE SUPPLY: Performed by: INTERNAL MEDICINE

## 2024-09-26 PROCEDURE — 6360000002 HC RX W HCPCS: Performed by: NURSE ANESTHETIST, CERTIFIED REGISTERED

## 2024-09-26 RX ORDER — SODIUM CHLORIDE 9 MG/ML
INJECTION, SOLUTION INTRAVENOUS
Status: DISCONTINUED | OUTPATIENT
Start: 2024-09-26 | End: 2024-09-26 | Stop reason: SDUPTHER

## 2024-09-26 RX ORDER — PROPOFOL 10 MG/ML
INJECTION, EMULSION INTRAVENOUS
Status: DISCONTINUED | OUTPATIENT
Start: 2024-09-26 | End: 2024-09-26 | Stop reason: SDUPTHER

## 2024-09-26 RX ORDER — LIDOCAINE HYDROCHLORIDE 20 MG/ML
INJECTION, SOLUTION INFILTRATION; PERINEURAL
Status: DISCONTINUED | OUTPATIENT
Start: 2024-09-26 | End: 2024-09-26 | Stop reason: SDUPTHER

## 2024-09-26 RX ADMIN — PROPOFOL 20 MG: 10 INJECTION, EMULSION INTRAVENOUS at 10:56

## 2024-09-26 RX ADMIN — PROPOFOL 20 MG: 10 INJECTION, EMULSION INTRAVENOUS at 10:47

## 2024-09-26 RX ADMIN — PROPOFOL 20 MG: 10 INJECTION, EMULSION INTRAVENOUS at 11:02

## 2024-09-26 RX ADMIN — LIDOCAINE HYDROCHLORIDE 60 MG: 20 INJECTION, SOLUTION INFILTRATION; PERINEURAL at 10:39

## 2024-09-26 RX ADMIN — PROPOFOL 20 MG: 10 INJECTION, EMULSION INTRAVENOUS at 10:49

## 2024-09-26 RX ADMIN — PROPOFOL 40 MG: 10 INJECTION, EMULSION INTRAVENOUS at 10:39

## 2024-09-26 RX ADMIN — PROPOFOL 20 MG: 10 INJECTION, EMULSION INTRAVENOUS at 10:45

## 2024-09-26 RX ADMIN — SODIUM CHLORIDE: 9 INJECTION, SOLUTION INTRAVENOUS at 10:36

## 2024-09-26 RX ADMIN — PROPOFOL 20 MG: 10 INJECTION, EMULSION INTRAVENOUS at 10:41

## 2024-09-26 RX ADMIN — PROPOFOL 20 MG: 10 INJECTION, EMULSION INTRAVENOUS at 10:43

## 2024-09-26 RX ADMIN — PROPOFOL 20 MG: 10 INJECTION, EMULSION INTRAVENOUS at 11:05

## 2024-09-26 RX ADMIN — PROPOFOL 20 MG: 10 INJECTION, EMULSION INTRAVENOUS at 10:52

## 2024-09-26 ASSESSMENT — PAIN - FUNCTIONAL ASSESSMENT
PAIN_FUNCTIONAL_ASSESSMENT: NONE - DENIES PAIN
PAIN_FUNCTIONAL_ASSESSMENT: 0-10

## 2024-09-26 ASSESSMENT — ENCOUNTER SYMPTOMS: SHORTNESS OF BREATH: 1

## 2024-09-26 NOTE — DISCHARGE INSTRUCTIONS
ENDOSCOPY DISCHARGE INSTRUCTIONS    You may experience some lightheadedness for the next several hours.  Plan on quiet relaxation for the rest of today.  A responsible adult needs to stay with you today.  Because of the medications you received today-do not drive,operate machinery,or sign any contractual agreement for the next 24 hours.  Do not drink any alcoholic beverages or take any unprescribed medications tonight.  Eat bland food and avoid anything greasy or spicy initially-progress to your normal diet gradually.  Diet restrictions as instructed.  You may resume home medications as instructed.  It is not unusual to experience some mild cramping or gas pains, and you may not have a bowel movement for several days.  If you have any of the following problems, notify your physician or return to the hospital emergency room : fever, chills, excessive bleeding, excessive vomiting, difficulty swallowing, uncontrolled pain, increased abdominal distention, shortness of breath or any other problems.  If you had a polyp removed, avoid strenuous activity for 48 hours.Avoid the use of aspirin or related compounds for one week, unless otherwise instructed by your physician.  You may notice a small amount of blood in your next few bowel movements, but if a large amount passes, call your physician.  If you have a sore throat, you may use lozenges or salt water gargles.     Impression: Ileocecal valve polyp  Diverticulosis  Internal hemorrhoid     Recommendations: Given his ileocecal valve polyp will definitely need to do a 1 year follow-up colonoscopy     Await the pathology     Okay for diet    ANESTHESIA DISCHARGE INSTRUCTIONS    Wear your seatbelt home.  You are under the influence of drugs-do not drink alcohol, drive, operate machinery, make any important decisions or sign any legal documents for 24 hours.  A responsible adult needs to be with you for 24 hours.  You may experience lightheadedness, dizziness, or sleepiness

## 2024-09-26 NOTE — PROGRESS NOTES
Discharge instructions reviewed with patient/responsible adult. All home medications have been reviewed, questions answered and patient and friend verbalized understanding.  Discharge instructions signed and copies given. Patient discharged  per w/c with belongings.

## 2024-09-26 NOTE — PROGRESS NOTES
Teaching/ education completed for home care including pain management, activity,safety precautions and infection control. Patient  and kvng verbalized understanding.

## 2024-09-26 NOTE — H&P
Gastroenterology Note             Pre-operative History and Physical    Patient: Carmelina Quesada  : 1945  CSN:     History Obtained From:  patient and/or guardian.     HISTORY OF PRESENT ILLNESS:    The patient is a 78 y.o. female  here for /colonoscopy.  This very pleasant 78-year-old female recently was in the office she is doing very well but she has had a history of colon polyps and she needs a colonoscopy    Past Medical History:    Past Medical History:   Diagnosis Date    Basal cell carcinoma     x 5    Bloating     Blood transfusion     age 29    Candida infection     stomach    Colon polyps     Dry eyes         Hepatitis B     age 29    Herpes genitalis in women 2012    HPV in female     DR CHAUHAN FOLLOWS PT    Hypothyroidism     Osteoporosis     Thyroid disease     hypo     Past Surgical History:    Past Surgical History:   Procedure Laterality Date    BREAST BIOPSY      benign (quit ERT)    CHOLECYSTECTOMY, LAPAROSCOPIC  2003    COLONOSCOPY N/A 10/10/2019    COLONOSCOPY POLYPECTOMY SNARE/COLD BIOPSY performed by Charissa Sarmiento MD at [facility] ENDOSCOPY    HEMORRHOID SURGERY  8-28-10    hemorhoidectomy    HYSTERECTOMY (CERVIX STATUS UNKNOWN)  1974    no BSO    JOINT REPLACEMENT      left total hip for fracture    JOINT REPLACEMENT      right hip replacement    ROTATOR CUFF REPAIR  2005    right    SKIN CANCER EXCISION  [id number]    BASAL CELL REMOVAL LEFT LEG     Medications Prior to Admission:   No current facility-administered medications on file prior to encounter.     Current Outpatient Medications on File Prior to Encounter   Medication Sig Dispense Refill    neomycin-polymyxin-hydrocortisone (CORTISPORIN) [id number] ophthalmic suspension       thyroid (ARMOUR THYROID) 90 MG tablet TAKE ONE TABLET BY MOUTH EVERY OTHER DAY 15 tablet 6    acyclovir (ZOVIRAX) 400 MG tablet Take 1 tablet by mouth daily 90 tablet 1    clotrimazole-betamethasone (LOTRISONE)

## 2024-09-26 NOTE — PROGRESS NOTES
Reviewed patient's medical and surgical history in electronic record and with patient at the bedside. All questions regarding procedure answered.   Scope verified using 2 person system.  Family in waiting room.  Electronically signed by Arelis Young RN on 9/26/2024 at 10:25 AM

## 2024-09-26 NOTE — PROCEDURES
Colonoscopy Procedure  Note          Patient: Carmelina Quesada  : 1945  CRN:  @CRN@    Procedure: Colonoscopy with polypectomy (cold snare), polypectomy (cold biopsy)    Date:  2024    Surgeon:  Jese Santiago MD, MD    Referring Physician:  Jatinder Shepherd, APRN - CNP    Preoperative Diagnosis:  History of colon polyps [Z86.010]    Postoperative Diagnosis:  1.5 cm flat polyp on the ICV   Diverticulosis of the sigmoid   Small hemorrhoids       Anesthesia:  MAC    EBL: Minimal to none.    Indications: This is a 78 y.o. year old female who comes in after being seen in the office and has a history of colon polyps     Procedure:   An informed consent was obtained from the patient after explanation of indications, benefits, possible risks and complications of the procedure.  The patient was then taken to the endoscopy suite, placed in the left lateral decubitus position, and the above IV anesthesia was administered.      Digital rectal examination was performed. No mass and good tone       Rectum  normal and small hemorrhoids on retroflexion     Sigmoid diverticulosis     Descending normal     Transverse normal     Ascending normal     Cecum  there is a 1.5 cm flat polyp and we tried and was able to remove a part of it with cold snare and then the ret with bx and we did inject eliview       TI not entered     Prep excellent       The patient tolerated the procedure well and was taken to the PACU in good condition.  There were no immediate complications.      Impression: Ileocecal valve polyp  Diverticulosis  Internal hemorrhoid    Recommendations: Given his ileocecal valve polyp will definitely need to do a 1 year follow-up colonoscopy    Await the pathology    Okay for diet    Thank you for this kind referral    Jese Santiago MD, MD   [facility]  2024      Please note that some or all of this record was generated using voice recognition software. If there are any questions about the content of this

## 2024-10-28 ENCOUNTER — TELEPHONE (OUTPATIENT)
Dept: FAMILY MEDICINE CLINIC | Age: 79
End: 2024-10-28

## 2024-10-28 NOTE — TELEPHONE ENCOUNTER
She had her labs performed in May, and everything looks fine.  No need to repeat from my perspective.

## 2024-10-28 NOTE — TELEPHONE ENCOUNTER
----- Message from Alanna VALADEZ sent at 10/28/2024 12:53 PM EDT -----  Regarding: ECC Message to Provider  ECC Message to Provider    Relationship to Patient: Self     Additional Information: Patient had an upcoming appointment on 11/4/2024 at 9:50 am with Jatinder Shepherd APRN - CNP for her Medicare Annual Wellness Visit. Does she needs to do a blood work? If so, kindly give her a call back for further instructions. Thank you!  --------------------------------------------------------------------------------------------------------------------------    Call Back Information: OK to leave message on voicemail  Preferred Call Back Number: Phone [phone]

## 2024-11-04 ENCOUNTER — OFFICE VISIT (OUTPATIENT)
Dept: FAMILY MEDICINE CLINIC | Age: 79
End: 2024-11-04

## 2024-11-04 VITALS
SYSTOLIC BLOOD PRESSURE: 120 MMHG | HEIGHT: 61 IN | HEART RATE: 80 BPM | DIASTOLIC BLOOD PRESSURE: 80 MMHG | OXYGEN SATURATION: 98 % | WEIGHT: 113 LBS | BODY MASS INDEX: 21.34 KG/M2

## 2024-11-04 DIAGNOSIS — Z00.00 MEDICARE ANNUAL WELLNESS VISIT, SUBSEQUENT: Primary | ICD-10-CM

## 2024-11-04 DIAGNOSIS — E03.4 HYPOTHYROIDISM DUE TO ACQUIRED ATROPHY OF THYROID: ICD-10-CM

## 2024-11-04 DIAGNOSIS — M25.532 LEFT WRIST PAIN: ICD-10-CM

## 2024-11-04 DIAGNOSIS — A60.09 HERPES GENITALIS IN WOMEN: ICD-10-CM

## 2024-11-04 RX ORDER — THYROID 90 MG/1
TABLET ORAL
Qty: 15 TABLET | Refills: 6 | Status: SHIPPED | OUTPATIENT
Start: 2024-11-04

## 2024-11-04 RX ORDER — ACYCLOVIR 400 MG/1
400 TABLET ORAL DAILY
Qty: 90 TABLET | Refills: 1 | Status: SHIPPED | OUTPATIENT
Start: 2024-11-04

## 2024-11-04 ASSESSMENT — PATIENT HEALTH QUESTIONNAIRE - PHQ9
1. LITTLE INTEREST OR PLEASURE IN DOING THINGS: NOT AT ALL
SUM OF ALL RESPONSES TO PHQ QUESTIONS 1-9: 0
SUM OF ALL RESPONSES TO PHQ QUESTIONS 1-9: 0
SUM OF ALL RESPONSES TO PHQ9 QUESTIONS 1 & 2: 0
SUM OF ALL RESPONSES TO PHQ QUESTIONS 1-9: 0
SUM OF ALL RESPONSES TO PHQ QUESTIONS 1-9: 0
2. FEELING DOWN, DEPRESSED OR HOPELESS: NOT AT ALL

## 2024-11-04 ASSESSMENT — LIFESTYLE VARIABLES
HOW OFTEN DO YOU HAVE A DRINK CONTAINING ALCOHOL: 4 OR MORE TIMES A WEEK
HOW MANY STANDARD DRINKS CONTAINING ALCOHOL DO YOU HAVE ON A TYPICAL DAY: 1 OR 2
HOW OFTEN DURING THE LAST YEAR HAVE YOU FAILED TO DO WHAT WAS NORMALLY EXPECTED FROM YOU BECAUSE OF DRINKING: NEVER
HOW OFTEN DURING THE LAST YEAR HAVE YOU FOUND THAT YOU WERE NOT ABLE TO STOP DRINKING ONCE YOU HAD STARTED: NEVER
HAS A RELATIVE, FRIEND, DOCTOR, OR ANOTHER HEALTH PROFESSIONAL EXPRESSED CONCERN ABOUT YOUR DRINKING OR SUGGESTED YOU CUT DOWN: NO
HOW OFTEN DURING THE LAST YEAR HAVE YOU HAD A FEELING OF GUILT OR REMORSE AFTER DRINKING: NEVER
HOW OFTEN DURING THE LAST YEAR HAVE YOU NEEDED AN ALCOHOLIC DRINK FIRST THING IN THE MORNING TO GET YOURSELF GOING AFTER A NIGHT OF HEAVY DRINKING: NEVER
HOW OFTEN DURING THE LAST YEAR HAVE YOU BEEN UNABLE TO REMEMBER WHAT HAPPENED THE NIGHT BEFORE BECAUSE YOU HAD BEEN DRINKING: NEVER
HAVE YOU OR SOMEONE ELSE BEEN INJURED AS A RESULT OF YOUR DRINKING: NO

## 2024-11-04 NOTE — PROGRESS NOTES
Medicare Annual Wellness Visit    Carmelina Quesada is here for Medicare AWV (MEDICARE ANNUAL WELLNESS VISIT)    Assessment & Plan   Medicare annual wellness visit, subsequent  -Healthcare topics addressed as below  -Fasting labs up-to-date  -Care gaps up-to-date  -X-ray of the further evaluate wrist pain  -Continue medications as ordered  -Follow-up in 6 months, or sooner if needed  Left wrist pain  -Patient has bruising and discomfort to the ulnar aspect of the left wrist and fifth digit of the left hand.  Ordering x-ray to further evaluate and rule out fracture.  Otherwise likely sprain.  Follow-up as needed.  -     XR WRIST LEFT (MIN 3 VIEWS); Future  Hypothyroidism due to acquired atrophy of thyroid  -Controlled.  Continue [place] Thyroid.  Follow-up in 6 months.  -     thyroid (ARMOUR THYROID) 90 MG tablet; TAKE ONE TABLET BY MOUTH EVERY OTHER DAY, Disp-15 tablet, R-6Normal  Herpes genitalis in women  -Controlled.  Continue acyclovir.  Follow-up in 6 months.  -     acyclovir (ZOVIRAX) 400 MG tablet; Take 1 tablet by mouth daily, Disp-90 tablet, R-1Normal      Recommendations for Preventive Services Due: see orders and patient instructions/AVS.  Recommended screening schedule for the next 5-10 years is provided to the patient in written form: see Patient Instructions/AVS.     Return in about 6 months (around 5/4/2025) for Follow-up thyroid/Fasting Labs.     Subjective   The following acute and/or chronic problems were also addressed today:  Carmelina presents today for Medicare annual wellness visit:    -More stress recently.  Having things break in the home which are requiring fixing.  Also having issues with her eyes which have not yet been resolved.  Following with [facility].  Not able to get an appointment for multiple months.  Had a fall over the weekend.  He went to ED in [place] prior to a concert.  She states that she was leaving the restaurant and tripped along with a friend.  The friend almost fell on her.  She fell

## 2024-11-05 ENCOUNTER — HOSPITAL ENCOUNTER (OUTPATIENT)
Dept: GENERAL RADIOLOGY | Age: 79
Discharge: HOME | End: 2024-11-05
Payer: MEDICARE

## 2024-11-05 ENCOUNTER — HOSPITAL ENCOUNTER (OUTPATIENT)
Age: 79
Discharge: HOME | End: 2024-11-05
Payer: MEDICARE

## 2024-11-05 DIAGNOSIS — M25.532 LEFT WRIST PAIN: ICD-10-CM

## 2024-11-05 PROCEDURE — 73110 X-RAY EXAM OF WRIST: CPT

## 2024-11-08 ENCOUNTER — TELEPHONE (OUTPATIENT)
Dept: FAMILY MEDICINE CLINIC | Age: 79
End: 2024-11-08

## 2024-11-08 NOTE — TELEPHONE ENCOUNTER
Still waiting on it but we can call her when it is back.  Unfortunately x-rays have been backed up lately.

## 2024-11-11 ENCOUNTER — TELEPHONE (OUTPATIENT)
Dept: FAMILY MEDICINE CLINIC | Age: 79
End: 2024-11-11

## 2024-11-11 NOTE — TELEPHONE ENCOUNTER
PATIENT CALLED IN TODAY AND WAS EXTREMELY CONFUSED. SHE SAW [facility] RECENTLY, HAD AN X RAY DONE OF HER WRIST BECAUSE SHE FELL, AND SHE STATES SHE WAS TOLD BY [facility] TO SCHEDULE WITH HER BONE DOCTOR? THE ONLY REFERRAL I SEE IN PATIENT CHART IS TO DOCTOR MAXINE. PATIENT STATES SHE HAS NEVER SEEN HIM AT ALL. SHE HAD NO IDEA WHO SHE WAS SUPPOSED TO MAKE AN APPT WITH AT THIS POINT. PATIENT AWARE I WILL CALL HER BACK AFTER  TALKING WITH KENNETH. SC

## 2024-11-11 NOTE — TELEPHONE ENCOUNTER
I apologize as I knew Dr. Rangel had referred her to Dr. Edgar in the past but did not realize that she never went.  We have talked about Prolia in the past as well but she had said she would think about it.  If she is interested in Prolia treatment, let us know.  This was recommended with her last DEXA scan.  She did not have any fractures or issues related to the fall.

## 2024-11-12 NOTE — TELEPHONE ENCOUNTER
CALLED PT CLARIFIED RESULTS, SHE NEVER WENT TO DR. GOODMAN BECAUSE SHE HAD A REALLY BAD REACTION TO FOSAMAX, SNOW GAVE HER SAMPLES WHEN SHE WAS HER, SHE NEVER STARTED PROLIA EITHER. SHE STATED ALYSHA GAVE HER THE NUMBER TO A DOCTOR WHO SCHEDULED HER FRIDAY SHE GAVE ME THE NUMBER AND ITS TO [facility]? I DO NOT SEE HER SCHEDULED THERE FRIDAY AND I AM NOT SURE WHY SHE WAS GIVEN THIS NUMBER? SHE WAS GIVEN THIS THROUGH XRAY RESULTS. WHO IS SHE SUPPOSED TO SEE AND WHY?    Alysha Jiménez MA  11/11/2024  8:38 AM EST Back to Top      SPOKE TO PT INFORMED OF RESULTS ADVISED TO [facility]         [phone]  [facility] NUMBER

## 2024-11-12 NOTE — TELEPHONE ENCOUNTER
Patient called in very confused about her xray she is very confused about what she's supposed to do and what is going on. She said she made an appointment, doesn't remember with who and would like someone to call her back about her arm.   Patient kept talking in circles, not sure what she was trying to say.

## 2024-11-13 NOTE — TELEPHONE ENCOUNTER
SPOKE WITH PT A COUPLE OF TIMES THE NUMBER SHE GAVE ME WAS [facility], SHE STATES SHE HAD A APPT THERE. I CALLED YESTERDAY AND SHE DOES NOT HAVE APPT WITH THEM. NOT SURE WHERE SHE GOT THIS NUMBER FROM,SHE IS CONFUSED BUT NEEDS TO JUST CALL AND SCHEDULE APPT WITH  AS SHE WAS TOLD BY KENNETH OR TO START THE PROLIA

## 2024-11-13 NOTE — TELEPHONE ENCOUNTER
I have no idea where rheumatology came from or where that number would have came from.  I had told her to follow-up with her osteoporosis doctor as I thought  She had seen Dr. Edgar.  Since she has not seen Dr. Edgar, and she has not started Prolia at all, I would recommend that she do Prolia based upon her DEXA scan in 2023.  I had already given her that recommendation at that time.  See if she is open to this.

## 2024-12-30 ENCOUNTER — HOSPITAL ENCOUNTER (OUTPATIENT)
Dept: WOMENS IMAGING | Age: 79
Discharge: HOME | End: 2024-12-30
Payer: MEDICARE

## 2024-12-30 VITALS — BODY MASS INDEX: 20.77 KG/M2 | HEIGHT: 61 IN | WEIGHT: 110 LBS

## 2024-12-30 DIAGNOSIS — Z12.31 BREAST CANCER SCREENING BY MAMMOGRAM: ICD-10-CM

## 2024-12-30 PROCEDURE — 77063 BREAST TOMOSYNTHESIS BI: CPT

## 2025-01-20 ENCOUNTER — OFFICE VISIT (OUTPATIENT)
Dept: ENT CLINIC | Age: 80
End: 2025-01-20
Payer: MEDICARE

## 2025-01-20 VITALS
DIASTOLIC BLOOD PRESSURE: 76 MMHG | WEIGHT: 109 LBS | BODY MASS INDEX: 20.58 KG/M2 | OXYGEN SATURATION: 96 % | SYSTOLIC BLOOD PRESSURE: 116 MMHG | TEMPERATURE: 97.1 F | HEART RATE: 81 BPM | HEIGHT: 61 IN

## 2025-01-20 DIAGNOSIS — H90.0 CONDUCTIVE HEARING LOSS OF BOTH EARS: ICD-10-CM

## 2025-01-20 DIAGNOSIS — H61.23 IMPACTED CERUMEN OF BOTH EARS: Primary | ICD-10-CM

## 2025-01-20 PROCEDURE — 69210 REMOVE IMPACTED EAR WAX UNI: CPT | Performed by: OTOLARYNGOLOGY

## 2025-01-20 NOTE — PROGRESS NOTES
CHIEF COMPLAINT:  Chief Complaint   Patient presents with    Cerumen Impaction    Hearing Loss       HISTORY:    Carmelina stated that the hearing is decreased in both ears, which are plugged up with ear wax.      EXAMINATION:    Both external ear canals were partially occluded by cerumen impaction, obscuring total visualization of the TMs.        PROCEDURE - REMOVAL OF BILATERAL CERUMEN IMPACTION (CPT 93617):   The cerumen impaction was removed from both of the EACs, under otomicroscopic visualization, with instrumentation, using a Billeau wire loop.  After successful cerumen removal, the EACs appeared to be normal and clear bilaterally without mass, exudate, or edema.  The tympanic membranes appeared to be normal, including normal pneumatic mobility bilaterally.  There was no evidence of acute disease.  Carmelina reported improved hearing, back to usual level, after cerumen removal.  .          IMPRESSION / DIAGNOSES / ORDERS / PROCEDURES:       Carmelina was seen today for cerumen impaction and hearing loss.    Diagnoses and all orders for this visit:    Impacted cerumen of both ears    Conductive hearing loss of both ears          RECOMMENDATIONS / PLAN:   See Patient Instructions on file for this visit.  Return for recurrence of symptoms of excessive ear wax, or any other ear, nose, throat, or sinus problems.

## 2025-04-16 ENCOUNTER — OFFICE VISIT (OUTPATIENT)
Dept: FAMILY MEDICINE CLINIC | Age: 80
End: 2025-04-16
Payer: MEDICARE

## 2025-04-16 VITALS
BODY MASS INDEX: 20.01 KG/M2 | TEMPERATURE: 98.3 F | HEIGHT: 61 IN | OXYGEN SATURATION: 96 % | HEART RATE: 84 BPM | WEIGHT: 106 LBS | RESPIRATION RATE: 16 BRPM | DIASTOLIC BLOOD PRESSURE: 70 MMHG | SYSTOLIC BLOOD PRESSURE: 120 MMHG

## 2025-04-16 DIAGNOSIS — Z01.818 PREOP EXAMINATION: ICD-10-CM

## 2025-04-16 DIAGNOSIS — H25.13 AGE-RELATED NUCLEAR CATARACT OF BOTH EYES: Primary | ICD-10-CM

## 2025-04-16 PROCEDURE — G8420 CALC BMI NORM PARAMETERS: HCPCS | Performed by: NURSE PRACTITIONER

## 2025-04-16 PROCEDURE — 1160F RVW MEDS BY RX/DR IN RCRD: CPT | Performed by: NURSE PRACTITIONER

## 2025-04-16 PROCEDURE — 99214 OFFICE O/P EST MOD 30 MIN: CPT | Performed by: NURSE PRACTITIONER

## 2025-04-16 PROCEDURE — G8427 DOCREV CUR MEDS BY ELIG CLIN: HCPCS | Performed by: NURSE PRACTITIONER

## 2025-04-16 PROCEDURE — G8399 PT W/DXA RESULTS DOCUMENT: HCPCS | Performed by: NURSE PRACTITIONER

## 2025-04-16 PROCEDURE — 1090F PRES/ABSN URINE INCON ASSESS: CPT | Performed by: NURSE PRACTITIONER

## 2025-04-16 PROCEDURE — 1159F MED LIST DOCD IN RCRD: CPT | Performed by: NURSE PRACTITIONER

## 2025-04-16 PROCEDURE — 1123F ACP DISCUSS/DSCN MKR DOCD: CPT | Performed by: NURSE PRACTITIONER

## 2025-04-16 PROCEDURE — 1036F TOBACCO NON-USER: CPT | Performed by: NURSE PRACTITIONER

## 2025-04-16 RX ORDER — KETOROLAC TROMETHAMINE 4 MG/ML
SOLUTION/ DROPS OPHTHALMIC
COMMUNITY
Start: 2025-03-27

## 2025-04-16 RX ORDER — MOXIFLOXACIN 5 MG/ML
SOLUTION/ DROPS OPHTHALMIC
COMMUNITY
Start: 2025-03-27

## 2025-04-16 RX ORDER — PREDNISOLONE ACETATE 10 MG/ML
SUSPENSION/ DROPS OPHTHALMIC
COMMUNITY
Start: 2025-03-27

## 2025-04-16 SDOH — ECONOMIC STABILITY: FOOD INSECURITY: WITHIN THE PAST 12 MONTHS, YOU WORRIED THAT YOUR FOOD WOULD RUN OUT BEFORE YOU GOT MONEY TO BUY MORE.: NEVER TRUE

## 2025-04-16 SDOH — ECONOMIC STABILITY: FOOD INSECURITY: WITHIN THE PAST 12 MONTHS, THE FOOD YOU BOUGHT JUST DIDN'T LAST AND YOU DIDN'T HAVE MONEY TO GET MORE.: NEVER TRUE

## 2025-04-16 ASSESSMENT — PATIENT HEALTH QUESTIONNAIRE - PHQ9
SUM OF ALL RESPONSES TO PHQ QUESTIONS 1-9: 0
1. LITTLE INTEREST OR PLEASURE IN DOING THINGS: NOT AT ALL
SUM OF ALL RESPONSES TO PHQ QUESTIONS 1-9: 0
SUM OF ALL RESPONSES TO PHQ QUESTIONS 1-9: 0
2. FEELING DOWN, DEPRESSED OR HOPELESS: NOT AT ALL
SUM OF ALL RESPONSES TO PHQ QUESTIONS 1-9: 0

## 2025-04-16 NOTE — PROGRESS NOTES
Preoperative Consultation      Carmelina Quesada  YOB: 1945    Date of Service:  4/16/2025    Vitals:    04/16/25 1454   BP: 120/70   BP Site: Right Upper Arm   Patient Position: Sitting   BP Cuff Size: Small Adult   Pulse: 84   Resp: 16   Temp: 98.3 °F (36.8 °C)   TempSrc: Oral   SpO2: 96%   Weight: 48.1 kg (106 lb)   Height: 1.549 m (5' 1\")      Wt Readings from Last 2 Encounters:   04/16/25 48.1 kg (106 lb)   01/20/25 49.4 kg (109 lb)     BP Readings from Last 3 Encounters:   04/16/25 120/70   01/20/25 116/76   11/04/24 120/80        Chief Complaint   Patient presents with    Pre-op Exam     Right cataract surgery at [facility] with Dr.Andrew De Los Santos on 4/21/25     Allergies   Allergen Reactions    Alendronate      Stomach upset    Doxycycline Other (See Comments)     Abdominal pain    Penicillins Hives    Sulfa Antibiotics      Outpatient Medications Marked as Taking for the 4/16/25 encounter (Office Visit) with Jatinder Shepherd APRN - CNP   Medication Sig Dispense Refill    ketorolac 0.4 % SOLN ophthalmic solution       moxifloxacin (VIGAMOX) 0.5 % ophthalmic solution       prednisoLONE acetate (PRED FORTE) 1 % ophthalmic suspension       thyroid (ARMOUR THYROID) 90 MG tablet TAKE ONE TABLET BY MOUTH EVERY OTHER DAY 15 tablet 6    acyclovir (ZOVIRAX) 400 MG tablet Take 1 tablet by mouth daily 90 tablet 1    neomycin-polymyxin-hydrocortisone (CORTISPORIN) 3.5-71791-0 ophthalmic suspension       clotrimazole-betamethasone (LOTRISONE) 1-0.05 % cream Apply topically 2 times daily. 15 g 1    NONFORMULARY OSTEO BASE      MISC NATURAL PRODUCTS PO Take by mouth ostebase (Ca+Vit D +Vit. K+ mg+)      MISC NATURAL PRODUCTS PO Take 1 capsule by mouth 3 times daily ALPHA BASE CAPS WITHOUT IRON      MISC NATURAL PRODUCTS PO Take 1 tablet by mouth daily 4SIGHT      MISC NATURAL PRODUCTS PO Take 2 tablets by mouth daily COSMEDIX      MISC NATURAL PRODUCTS PO Take 1 tablet by mouth daily ORTHO DIGESTZYME      MISC NATURAL

## 2025-05-16 ENCOUNTER — TELEPHONE (OUTPATIENT)
Dept: FAMILY MEDICINE CLINIC | Age: 80
End: 2025-05-16

## 2025-05-16 NOTE — TELEPHONE ENCOUNTER
Pt just wanted Tyson to know she had her cataract surgery done   Right eye 4.21.25  Left eye   5.5.25

## 2025-05-21 DIAGNOSIS — A60.09 HERPES GENITALIS IN WOMEN: ICD-10-CM

## 2025-05-21 RX ORDER — ACYCLOVIR 400 MG/1
400 TABLET ORAL DAILY
Qty: 90 TABLET | Refills: 0 | Status: SHIPPED | OUTPATIENT
Start: 2025-05-21

## 2025-05-30 ENCOUNTER — OFFICE VISIT (OUTPATIENT)
Dept: FAMILY MEDICINE CLINIC | Age: 80
End: 2025-05-30
Payer: MEDICARE

## 2025-05-30 ENCOUNTER — RESULTS FOLLOW-UP (OUTPATIENT)
Dept: FAMILY MEDICINE CLINIC | Age: 80
End: 2025-05-30

## 2025-05-30 VITALS
DIASTOLIC BLOOD PRESSURE: 70 MMHG | HEART RATE: 81 BPM | WEIGHT: 105 LBS | BODY MASS INDEX: 19.83 KG/M2 | SYSTOLIC BLOOD PRESSURE: 120 MMHG | OXYGEN SATURATION: 98 % | HEIGHT: 61 IN

## 2025-05-30 DIAGNOSIS — A60.09 HERPES GENITALIS IN WOMEN: ICD-10-CM

## 2025-05-30 DIAGNOSIS — E55.9 VITAMIN D DEFICIENCY: ICD-10-CM

## 2025-05-30 DIAGNOSIS — R31.9 HEMATURIA, UNSPECIFIED TYPE: ICD-10-CM

## 2025-05-30 DIAGNOSIS — E78.00 HYPERCHOLESTEREMIA: ICD-10-CM

## 2025-05-30 DIAGNOSIS — N89.8 VAGINAL DISCHARGE: ICD-10-CM

## 2025-05-30 DIAGNOSIS — R73.01 IFG (IMPAIRED FASTING GLUCOSE): ICD-10-CM

## 2025-05-30 DIAGNOSIS — E03.4 HYPOTHYROIDISM DUE TO ACQUIRED ATROPHY OF THYROID: Primary | ICD-10-CM

## 2025-05-30 LAB
25(OH)D3 SERPL-MCNC: 97.5 NG/ML
ALBUMIN SERPL-MCNC: 4.4 G/DL (ref 3.4–5)
ALBUMIN/GLOB SERPL: 1.8 {RATIO} (ref 1.1–2.2)
ALP SERPL-CCNC: 106 U/L (ref 40–129)
ALT SERPL-CCNC: 19 U/L (ref 10–40)
ANION GAP SERPL CALCULATED.3IONS-SCNC: 10 MMOL/L (ref 3–16)
AST SERPL-CCNC: 23 U/L (ref 15–37)
BILIRUB SERPL-MCNC: 0.6 MG/DL (ref 0–1)
BILIRUBIN, POC: NEGATIVE
BLOOD URINE, POC: ABNORMAL
BUN SERPL-MCNC: 10 MG/DL (ref 7–20)
CALCIUM SERPL-MCNC: 9.9 MG/DL (ref 8.3–10.6)
CHLORIDE SERPL-SCNC: 97 MMOL/L (ref 99–110)
CHOLEST SERPL-MCNC: 217 MG/DL (ref 0–199)
CLARITY, POC: CLEAR
CO2 SERPL-SCNC: 26 MMOL/L (ref 21–32)
COLOR, POC: YELLOW
CREAT SERPL-MCNC: 0.7 MG/DL (ref 0.6–1.2)
EST. AVERAGE GLUCOSE BLD GHB EST-MCNC: 105.4 MG/DL
GFR SERPLBLD CREATININE-BSD FMLA CKD-EPI: 87 ML/MIN/{1.73_M2}
GLUCOSE SERPL-MCNC: 103 MG/DL (ref 70–99)
GLUCOSE URINE, POC: NEGATIVE MG/DL
HBA1C MFR BLD: 5.3 %
HDLC SERPL-MCNC: 105 MG/DL (ref 40–60)
KETONES, POC: NEGATIVE MG/DL
LDLC SERPL CALC-MCNC: 99 MG/DL
LEUKOCYTE EST, POC: NEGATIVE
NITRITE, POC: NEGATIVE
PH, POC: 7
POTASSIUM SERPL-SCNC: 4.4 MMOL/L (ref 3.5–5.1)
PROT SERPL-MCNC: 6.8 G/DL (ref 6.4–8.2)
PROTEIN, POC: NEGATIVE MG/DL
SODIUM SERPL-SCNC: 133 MMOL/L (ref 136–145)
SPECIFIC GRAVITY, POC: 1.01
TRIGL SERPL-MCNC: 64 MG/DL (ref 0–150)
TSH SERPL DL<=0.005 MIU/L-ACNC: 0.97 UIU/ML (ref 0.27–4.2)
UROBILINOGEN, POC: 0.2 MG/DL
VLDLC SERPL CALC-MCNC: 13 MG/DL

## 2025-05-30 PROCEDURE — 1159F MED LIST DOCD IN RCRD: CPT | Performed by: NURSE PRACTITIONER

## 2025-05-30 PROCEDURE — G8399 PT W/DXA RESULTS DOCUMENT: HCPCS | Performed by: NURSE PRACTITIONER

## 2025-05-30 PROCEDURE — 1090F PRES/ABSN URINE INCON ASSESS: CPT | Performed by: NURSE PRACTITIONER

## 2025-05-30 PROCEDURE — 99214 OFFICE O/P EST MOD 30 MIN: CPT | Performed by: NURSE PRACTITIONER

## 2025-05-30 PROCEDURE — 36415 COLL VENOUS BLD VENIPUNCTURE: CPT | Performed by: NURSE PRACTITIONER

## 2025-05-30 PROCEDURE — G8427 DOCREV CUR MEDS BY ELIG CLIN: HCPCS | Performed by: NURSE PRACTITIONER

## 2025-05-30 PROCEDURE — 81002 URINALYSIS NONAUTO W/O SCOPE: CPT | Performed by: NURSE PRACTITIONER

## 2025-05-30 PROCEDURE — G2211 COMPLEX E/M VISIT ADD ON: HCPCS | Performed by: NURSE PRACTITIONER

## 2025-05-30 PROCEDURE — 1036F TOBACCO NON-USER: CPT | Performed by: NURSE PRACTITIONER

## 2025-05-30 PROCEDURE — 1123F ACP DISCUSS/DSCN MKR DOCD: CPT | Performed by: NURSE PRACTITIONER

## 2025-05-30 PROCEDURE — 1160F RVW MEDS BY RX/DR IN RCRD: CPT | Performed by: NURSE PRACTITIONER

## 2025-05-30 PROCEDURE — G8420 CALC BMI NORM PARAMETERS: HCPCS | Performed by: NURSE PRACTITIONER

## 2025-05-30 RX ORDER — ACYCLOVIR 400 MG/1
400 TABLET ORAL DAILY
Qty: 90 TABLET | Refills: 1 | Status: SHIPPED | OUTPATIENT
Start: 2025-05-30

## 2025-05-30 RX ORDER — THYROID 90 MG/1
TABLET ORAL
Qty: 45 TABLET | Refills: 2 | Status: SHIPPED | OUTPATIENT
Start: 2025-05-30

## 2025-05-30 ASSESSMENT — ENCOUNTER SYMPTOMS
VOMITING: 0
WHEEZING: 0
ABDOMINAL PAIN: 0
SHORTNESS OF BREATH: 0
EYE DISCHARGE: 0
COUGH: 0
SINUS PAIN: 0
DIARRHEA: 0
EYE PAIN: 0
NAUSEA: 0
EYE REDNESS: 0
SORE THROAT: 0
SINUS PRESSURE: 0
ABDOMINAL DISTENTION: 0

## 2025-05-30 NOTE — PATIENT INSTRUCTIONS
There used to be a gynecologist group with [facility] in [place] on [address]. You could see if they're still open.    You may receive a survey regarding the care you received during your visit.  Your input is valuable to us.  We encourage you to complete and return your survey.  We hope you will choose us in the future for your healthcare needs.

## 2025-05-30 NOTE — PROGRESS NOTES
Carmelina Quesada (:  1945) is a 79 y.o. female,Established patient, here for evaluation of the following chief complaint(s):  Hypothyroidism (FOLLOW UP ON THYROID ISSUES )      Assessment & Plan  Hypothyroidism due to acquired atrophy of thyroid    Historically controlled.  Continue [place] Thyroid every other day.  Fasting labs today.  Follow-up in 6 months for AWV, or sooner if needed.    Orders:    thyroid (ARMOUR THYROID) 90 MG tablet; TAKE ONE TABLET BY MOUTH EVERY OTHER DAY    Comprehensive Metabolic Panel; Future    TSH reflex to FT4; Future    Vaginal discharge    Description is consistent with normal discharge or yeast infection.  Obtaining UA and vaginal pathogen.  Intervention based upon results.    Orders:    POCT Urinalysis no Micro    VAGINITIS PANEL PCR; Future    Culture, Urine; Future    Hematuria, unspecified type    UA was positive for blood.  Sending for culture.  No intervention pending results.    Orders:    POCT Urinalysis no Micro    Culture, Urine; Future    Herpes genitalis in women  Controlled.  Continue daily acyclovir.  Follow-up in 6 months.    Orders:    acyclovir (ZOVIRAX) 400 MG tablet; Take 1 tablet by mouth daily    Vitamin D deficiency    Controlled.  Continue vitamin D supplementation.  Fasting labs today.  Follow-up in 6 months, or sooner if needed.    Orders:    Vitamin D 25 Hydroxy; Future    Hypercholesteremia    Controlled.  Not treated at this time.  Fasting labs today.  Educated that she does not need to repeat [employer] screening given normal carotid, abdominal aorta, and EKG screenings.  Follow-up in 6 months, or sooner if needed.    Orders:    Lipid Panel; Future    Comprehensive Metabolic Panel; Future    IFG (impaired fasting glucose)    Controlled.  Not treated at this time.  Fasting labs today.  Follow-up in 6 months, or sooner if needed.    Orders:    Hemoglobin A1C; Future            Return in about 5 months (around 2025) for Medicare Annual Wellness/Flu

## 2025-05-30 NOTE — ASSESSMENT & PLAN NOTE
Controlled.  Continue daily acyclovir.  Follow-up in 6 months.    Orders:    acyclovir (ZOVIRAX) 400 MG tablet; Take 1 tablet by mouth daily

## 2025-05-30 NOTE — ASSESSMENT & PLAN NOTE
Controlled.  Not treated at this time.  Fasting labs today.  Educated that she does not need to repeat [employer] screening given normal carotid, abdominal aorta, and EKG screenings.  Follow-up in 6 months, or sooner if needed.    Orders:    Lipid Panel; Future    Comprehensive Metabolic Panel; Future

## 2025-05-30 NOTE — ASSESSMENT & PLAN NOTE
Historically controlled.  Continue [place] Thyroid every other day.  Fasting labs today.  Follow-up in 6 months for AWV, or sooner if needed.    Orders:    thyroid (ARMOUR THYROID) 90 MG tablet; TAKE ONE TABLET BY MOUTH EVERY OTHER DAY    Comprehensive Metabolic Panel; Future    TSH reflex to FT4; Future

## 2025-05-30 NOTE — ASSESSMENT & PLAN NOTE
Controlled.  Continue vitamin D supplementation.  Fasting labs today.  Follow-up in 6 months, or sooner if needed.    Orders:    Vitamin D 25 Hydroxy; Future

## 2025-05-31 LAB
BACTERIA UR CULT: NORMAL
BV BACTERIA DNA VAG QL NAA+PROBE: NOT DETECTED
C GLABRATA DNA VAG QL NAA+PROBE: NORMAL
C GLABRATA DNA VAG QL NAA+PROBE: NOT DETECTED
C KRUSEI DNA VAG QL NAA+PROBE: NOT DETECTED
CANDIDA DNA VAG QL NAA+PROBE: NOT DETECTED
T VAGINALIS DNA VAG QL NAA+PROBE: NOT DETECTED

## 2025-07-24 ENCOUNTER — OFFICE VISIT (OUTPATIENT)
Dept: ENT CLINIC | Age: 80
End: 2025-07-24
Payer: MEDICARE

## 2025-07-24 VITALS
WEIGHT: 107 LBS | HEIGHT: 61 IN | DIASTOLIC BLOOD PRESSURE: 75 MMHG | HEART RATE: 71 BPM | SYSTOLIC BLOOD PRESSURE: 124 MMHG | BODY MASS INDEX: 20.2 KG/M2 | OXYGEN SATURATION: 96 % | TEMPERATURE: 97.5 F

## 2025-07-24 DIAGNOSIS — H61.23 IMPACTED CERUMEN OF BOTH EARS: Primary | ICD-10-CM

## 2025-07-24 PROCEDURE — 69210 REMOVE IMPACTED EAR WAX UNI: CPT | Performed by: STUDENT IN AN ORGANIZED HEALTH CARE EDUCATION/TRAINING PROGRAM

## 2025-07-24 NOTE — PROGRESS NOTES
[facility]  DIVISION OF OTOLARYNGOLOGY- HEAD & NECK SURGERY  CLINIC FOLLOW-UP VISIT      Patient Name: Carmelina Quesada  Medical Record Number:  [id number]  Primary Care Physician:  Jatinder Shepherd, GRISELDA - SUZANNE    ChiefComplaint     Chief Complaint   Patient presents with    Ear Problem     Ear cleaning        History of Present Illness     Carmelina Quesada is an 79 y.o. female previously seen for bilateral impacted cerumen conductive hearing loss.  Last seen by Dr. Cummins 1/20/2025.    Interval History:   She feels like she has earwax buildup, worse on the left.  Denies otalgia or otorrhea.  Denies recent acute hearing changes.    Past Medical History     Past Medical History:   Diagnosis Date    Basal cell carcinoma     x 5    Bloating     Blood transfusion     age 29    Candida infection     stomach    Colon polyps     Dry eyes     9/16    Hepatitis B     age 29    Herpes genitalis in women 11/29/2012    HPV in female 2010    DR CHAUHAN FOLLOWS PT    Hypothyroidism     Osteoporosis     Thyroid disease     hypo       Past Surgical History     Past Surgical History:   Procedure Laterality Date    BREAST BIOPSY  2002    benign (quit ERT)    CHOLECYSTECTOMY, LAPAROSCOPIC  09/04/2003    COLONOSCOPY N/A 10/10/2019    COLONOSCOPY POLYPECTOMY SNARE/COLD BIOPSY performed by Charissa Sarmiento MD at [facility] ENDOSCOPY    COLONOSCOPY N/A 09/26/2024    COLONOSCOPY POLYPECTOMY SNARE/BIOPSY performed by Jese Santiago MD at [facility] ENDOSCOPY    COLONOSCOPY  09/26/2024    COLONOSCOPY BIOPSY performed by Jese Santiago MD at [facility] ENDOSCOPY    COLONOSCOPY  09/26/2024    COLONOSCOPY SUBMUCOSAL INJECTION performed by Jese Santiago MD at [facility] ENDOSCOPY    HEMORRHOID SURGERY  08/28/2010    hemorhoidectomy    HYSTERECTOMY (CERVIX STATUS UNKNOWN)  1974    no BSO    JOINT REPLACEMENT  2009    left total hip for fracture    JOINT REPLACEMENT  2019    right hip replacement    MOUTH SURGERY      ROTATOR CUFF REPAIR  05/17/2005    right    SKIN

## 2025-08-13 ENCOUNTER — TELEPHONE (OUTPATIENT)
Dept: FAMILY MEDICINE CLINIC | Age: 80
End: 2025-08-13

## 2025-08-13 ENCOUNTER — OFFICE VISIT (OUTPATIENT)
Dept: FAMILY MEDICINE CLINIC | Age: 80
End: 2025-08-13
Payer: MEDICARE

## 2025-08-13 VITALS
BODY MASS INDEX: 20.2 KG/M2 | DIASTOLIC BLOOD PRESSURE: 70 MMHG | HEART RATE: 70 BPM | OXYGEN SATURATION: 99 % | WEIGHT: 107 LBS | SYSTOLIC BLOOD PRESSURE: 110 MMHG | HEIGHT: 61 IN

## 2025-08-13 DIAGNOSIS — R39.15 URINARY URGENCY: Primary | ICD-10-CM

## 2025-08-13 LAB
BILIRUBIN, POC: NEGATIVE
BLOOD URINE, POC: NEGATIVE
CLARITY, POC: ABNORMAL
COLOR, POC: YELLOW
GLUCOSE URINE, POC: NEGATIVE MG/DL
KETONES, POC: NEGATIVE MG/DL
LEUKOCYTE EST, POC: ABNORMAL
NITRITE, POC: NEGATIVE
PH, POC: 6.5
PROTEIN, POC: NEGATIVE MG/DL
SPECIFIC GRAVITY, POC: 1.01
UROBILINOGEN, POC: 0.2 MG/DL

## 2025-08-13 PROCEDURE — 1090F PRES/ABSN URINE INCON ASSESS: CPT | Performed by: NURSE PRACTITIONER

## 2025-08-13 PROCEDURE — 99213 OFFICE O/P EST LOW 20 MIN: CPT | Performed by: NURSE PRACTITIONER

## 2025-08-13 PROCEDURE — G8427 DOCREV CUR MEDS BY ELIG CLIN: HCPCS | Performed by: NURSE PRACTITIONER

## 2025-08-13 PROCEDURE — 1123F ACP DISCUSS/DSCN MKR DOCD: CPT | Performed by: NURSE PRACTITIONER

## 2025-08-13 PROCEDURE — G8420 CALC BMI NORM PARAMETERS: HCPCS | Performed by: NURSE PRACTITIONER

## 2025-08-13 PROCEDURE — 1159F MED LIST DOCD IN RCRD: CPT | Performed by: NURSE PRACTITIONER

## 2025-08-13 PROCEDURE — 1160F RVW MEDS BY RX/DR IN RCRD: CPT | Performed by: NURSE PRACTITIONER

## 2025-08-13 PROCEDURE — 1036F TOBACCO NON-USER: CPT | Performed by: NURSE PRACTITIONER

## 2025-08-13 PROCEDURE — 81002 URINALYSIS NONAUTO W/O SCOPE: CPT | Performed by: NURSE PRACTITIONER

## 2025-08-13 PROCEDURE — G8399 PT W/DXA RESULTS DOCUMENT: HCPCS | Performed by: NURSE PRACTITIONER

## 2025-08-13 ASSESSMENT — ENCOUNTER SYMPTOMS
SHORTNESS OF BREATH: 0
COUGH: 0
WHEEZING: 0

## 2025-08-14 LAB
BACTERIA UR CULT: ABNORMAL
ORGANISM: ABNORMAL

## 2025-08-28 ENCOUNTER — TELEPHONE (OUTPATIENT)
Dept: FAMILY MEDICINE CLINIC | Age: 80
End: 2025-08-28

## (undated) DEVICE — FORCEPS BX L240CM JAW DIA22MM ORNG STD CAP W NDL RAD JAW 4

## (undated) DEVICE — SNARES COLD OVAL 10MM THIN

## (undated) DEVICE — ELEVIEW SUBMUCOSAL INJECTABLE COMPOSITION 10ML

## (undated) DEVICE — TRAP SPEC RETRV CLR PLAS POLYP IN LN SUCT QUIK CTCH

## (undated) DEVICE — BW-412T DISP COMBO CLEANING BRUSH: Brand: SINGLE USE COMBINATION CLEANING BRUSH

## (undated) DEVICE — SOLUTION IV IRRIG WATER 500ML POUR BRL ST 2F7113

## (undated) DEVICE — Device: Brand: DISPOSABLE ELECTROSURGICAL SNARE

## (undated) DEVICE — GOWN AURORA NONREINF LG: Brand: MEDLINE INDUSTRIES, INC.

## (undated) DEVICE — PROCEDURE KIT ENDOSCP CUST

## (undated) DEVICE — ENDOSCOPIC KIT 6X3/16 FT COLON W/ 1.1 OZ 2 GWN W/O BRSH

## (undated) DEVICE — SET VLV 3 PC AWS DISPOSABLE GRDIAN SCOPEVALET

## (undated) DEVICE — SOLUTION IRRIG 500ML STRL H2O NONPYROGENIC

## (undated) DEVICE — NEEDLE INJ 25GA P5MM SHFT L230CM SHTH DIA2.5MM S STL TEF

## (undated) DEVICE — AIR/WATER CLEANING ADAPTER FOR OLYMPUS® GI ENDOSCOPE: Brand: BULLDOG®

## (undated) DEVICE — SNARE COLD DIAMOND 15MM THIN

## (undated) DEVICE — SINGLE USE AIR/WATER, SUCTION AND BIOPSY VALVES SET: Brand: ORCAPOD™

## (undated) DEVICE — 60 ML SYRINGE,REGULAR TIP: Brand: MONOJECT